# Patient Record
Sex: MALE | Race: BLACK OR AFRICAN AMERICAN | NOT HISPANIC OR LATINO | Employment: OTHER | URBAN - METROPOLITAN AREA
[De-identification: names, ages, dates, MRNs, and addresses within clinical notes are randomized per-mention and may not be internally consistent; named-entity substitution may affect disease eponyms.]

---

## 2017-02-27 ENCOUNTER — OFFICE VISIT (OUTPATIENT)
Dept: MEDICAL GROUP | Facility: MEDICAL CENTER | Age: 57
End: 2017-02-27
Attending: FAMILY MEDICINE
Payer: MEDICAID

## 2017-02-27 VITALS
OXYGEN SATURATION: 98 % | TEMPERATURE: 97.7 F | DIASTOLIC BLOOD PRESSURE: 62 MMHG | WEIGHT: 175 LBS | HEIGHT: 72 IN | BODY MASS INDEX: 23.7 KG/M2 | RESPIRATION RATE: 16 BRPM | HEART RATE: 80 BPM | SYSTOLIC BLOOD PRESSURE: 108 MMHG

## 2017-02-27 DIAGNOSIS — R39.11 URINARY HESITANCY: ICD-10-CM

## 2017-02-27 DIAGNOSIS — M79.671 PAIN IN BOTH FEET: ICD-10-CM

## 2017-02-27 DIAGNOSIS — M25.562 CHRONIC PAIN OF LEFT KNEE: ICD-10-CM

## 2017-02-27 DIAGNOSIS — M25.512 LEFT SHOULDER PAIN, UNSPECIFIED CHRONICITY: ICD-10-CM

## 2017-02-27 DIAGNOSIS — G89.29 CHRONIC PAIN OF LEFT KNEE: ICD-10-CM

## 2017-02-27 DIAGNOSIS — M54.5 MIDLINE LOW BACK PAIN, UNSPECIFIED CHRONICITY, WITH SCIATICA PRESENCE UNSPECIFIED: ICD-10-CM

## 2017-02-27 DIAGNOSIS — M79.672 PAIN IN BOTH FEET: ICD-10-CM

## 2017-02-27 DIAGNOSIS — Z12.11 COLON CANCER SCREENING: ICD-10-CM

## 2017-02-27 DIAGNOSIS — Z00.00 HEALTH CARE MAINTENANCE: ICD-10-CM

## 2017-02-27 DIAGNOSIS — Z12.5 SCREENING PSA (PROSTATE SPECIFIC ANTIGEN): ICD-10-CM

## 2017-02-27 DIAGNOSIS — J44.9 CHRONIC OBSTRUCTIVE PULMONARY DISEASE, UNSPECIFIED COPD TYPE (HCC): ICD-10-CM

## 2017-02-27 DIAGNOSIS — J45.40 MODERATE PERSISTENT ASTHMA WITHOUT COMPLICATION: ICD-10-CM

## 2017-02-27 PROCEDURE — 99214 OFFICE O/P EST MOD 30 MIN: CPT | Performed by: FAMILY MEDICINE

## 2017-02-27 PROCEDURE — 99215 OFFICE O/P EST HI 40 MIN: CPT | Performed by: FAMILY MEDICINE

## 2017-02-27 RX ORDER — ALBUTEROL SULFATE 90 UG/1
2 AEROSOL, METERED RESPIRATORY (INHALATION) EVERY 6 HOURS PRN
Qty: 8.5 G | Refills: 3 | Status: ON HOLD | OUTPATIENT
Start: 2017-02-27 | End: 2018-08-23

## 2017-02-27 RX ORDER — ALBUTEROL SULFATE 90 UG/1
2 AEROSOL, METERED RESPIRATORY (INHALATION) EVERY 6 HOURS PRN
Qty: 8.5 G | Refills: 3 | Status: SHIPPED | OUTPATIENT
Start: 2017-02-27 | End: 2017-02-27 | Stop reason: SDUPTHER

## 2017-02-27 RX ORDER — TIOTROPIUM BROMIDE 18 UG/1
18 CAPSULE ORAL; RESPIRATORY (INHALATION) DAILY
Qty: 30 CAP | Refills: 3 | Status: SHIPPED | OUTPATIENT
Start: 2017-02-27 | End: 2017-02-27 | Stop reason: SDUPTHER

## 2017-02-27 RX ORDER — GABAPENTIN 100 MG/1
100 CAPSULE ORAL 3 TIMES DAILY
Qty: 90 CAP | Refills: 3 | Status: SHIPPED | OUTPATIENT
Start: 2017-02-27 | End: 2018-08-17

## 2017-02-27 RX ORDER — TIOTROPIUM BROMIDE 18 UG/1
18 CAPSULE ORAL; RESPIRATORY (INHALATION) DAILY
Qty: 30 CAP | Refills: 3 | Status: SHIPPED | OUTPATIENT
Start: 2017-02-27 | End: 2018-08-17

## 2017-02-27 RX ORDER — GABAPENTIN 100 MG/1
100 CAPSULE ORAL 3 TIMES DAILY
Qty: 90 CAP | Refills: 3 | Status: SHIPPED | OUTPATIENT
Start: 2017-02-27 | End: 2017-02-27 | Stop reason: SDUPTHER

## 2017-02-27 RX ORDER — IBUPROFEN 800 MG/1
400-800 TABLET ORAL EVERY 8 HOURS PRN
Qty: 30 TAB | Refills: 3 | Status: SHIPPED | OUTPATIENT
Start: 2017-02-27 | End: 2017-02-27 | Stop reason: SDUPTHER

## 2017-02-27 RX ORDER — IBUPROFEN 800 MG/1
400-800 TABLET ORAL EVERY 8 HOURS PRN
Qty: 30 TAB | Refills: 3 | Status: SHIPPED | OUTPATIENT
Start: 2017-02-27 | End: 2018-08-17

## 2017-02-27 ASSESSMENT — ENCOUNTER SYMPTOMS
CHILLS: 0
WHEEZING: 1
TREMORS: 0
COUGH: 1
SPEECH CHANGE: 0
HEADACHES: 0
FEVER: 0
FOCAL WEAKNESS: 0
VOMITING: 0
PALPITATIONS: 0
NAUSEA: 0
SHORTNESS OF BREATH: 0
BACK PAIN: 1
SENSORY CHANGE: 0
SPUTUM PRODUCTION: 0
NECK PAIN: 0
DEPRESSION: 1
TINGLING: 1
ABDOMINAL PAIN: 0

## 2017-02-27 ASSESSMENT — PAIN SCALES - GENERAL: PAINLEVEL: 10=SEVERE PAIN

## 2017-02-27 ASSESSMENT — LIFESTYLE VARIABLES: HISTORY_ALCOHOL_USE: 0

## 2017-02-27 ASSESSMENT — PATIENT HEALTH QUESTIONNAIRE - PHQ9: CLINICAL INTERPRETATION OF PHQ2 SCORE: 2

## 2017-02-27 NOTE — PROGRESS NOTES
Subjective:      Korey Davis is a 57 y.o. male who presents with Establish Care; Pain; Medication Management; and Asthma            HPI Comments: Pt here to reestablish with the clinic, he has a history of chronic pain (back, knee, shoulder, B feet) and asthma.     He will need to get a refill of his inhaler. He states that he is needing to use his rescue inhaler on a daily basis. He states he was never given a maintenance inhaler, so we will start patient using Spiriva on a daily basis. We'll also order a pulmonary function test to see the severity of the obstructive lung disease he is suffering from.  Patient states he also smokes about half a pack a day. Discussed the risks of continuing to smoke with pre-existing asthma or COPD. Discussed nicotine patches which patient declined today. Also discussed attending smoking cessation classes in the community to assist in quitting smoking. We'll continue to follow.    He states that he recently fell and injured his left shoulder and is having difficulty extending his shoulder all the way forward and upward. An x-ray has been ordered for patient. He states he did not go to the emergency room after a fall despite having difficulty moving his shoulder. Patient has been advised if he continues to have issues with his shoulder he should proceed to the emergency room but a referral to an orthopedic doctor has also been made for patient. Will also refer patient to physical therapy for additional assistance in the management of his shoulder pain. Continue to follow.    Patient states that he had several surgeries to his left knee over a period of several years starting when he was still living in [place]. Patient states that he was incarcerated for a period of time and during that time he did reinjure his knee. We will get an x-ray of his knee as well as referral back to orthopedics for any further assistance in management of his knee. Patient has been started on  Neurontin as well as Motrin. A pain management referral was also made for patient to have any additional assistance in managing his chronic knee pain as well as his other chronic pain issues. We'll continue to follow.    He has also been having issues with his lower back. He states that he did not have any recent back injuries but has been having more pain that does not radiate but is causing him constant discomfort. The previous x-ray of his back showed degenerative changes so a MRI will be ordered to further assess for any soft tissue issues in his lower back. Will refer to pain management as well for additional assistance in managing his chronic back pain as well as his other chronic pain issues. We'll continue to follow.    He has also been having pain in his feet bilaterally he has changes consistent with bunions on both feet. Will get x-rays to further assess the changes in his feet. We'll continue to follow.    Due to his age greater than 50 will order a fit screen to further assess for colon cancer. We'll also order other health maintenance tests to check his cholesterol as well as metabolic function and complete blood count. We'll also assess his vitamin D status due to all of his chronic pain issues as well as a thyroid function to assess his reason for his chronic fatigue. We'll continue to follow.    We'll also order a PSA since he has been having issues with increased frequency and occasional hesitancy. He has not noticed any odor or color changes in his urine but will order a urinalysis to further assess for the possibility of infection. He is currently not on any medications for his urinary hesitancy but he states he has been seen at [facility] so will attempt to get records from [facility] to see of the treatments already attempted. Continue to follow.    His past surgical history includes multiple knee surgeries.    Patient is unaware of any significant family medical history. Patient states that  "everyone in his family has high blood pressure and thinks they're being treated for that.    Patient states he smokes at least half a pack a day, denies drinking alcohol and denies using any other substances.          Review of Systems   Constitutional: Negative for fever and chills.   HENT: Negative for hearing loss.    Respiratory: Positive for cough and wheezing. Negative for sputum production and shortness of breath.    Cardiovascular: Negative for chest pain and palpitations.   Gastrointestinal: Negative for nausea, vomiting and abdominal pain.   Genitourinary: Positive for urgency and frequency. Negative for dysuria and hematuria.   Musculoskeletal: Positive for back pain and joint pain. Negative for neck pain.   Neurological: Positive for tingling. Negative for tremors, sensory change, speech change, focal weakness and headaches.          Objective:     Temp(Src) 36.5 °C (97.7 °F)  Ht 1.829 m (6' 0.01\")  Wt 79.379 kg (175 lb)  BMI 23.73 kg/m2  SpO2 98%     Physical Exam   HENT:   Right Ear: External ear normal.   Left Ear: External ear normal.   Nose: Nose normal.   Mouth/Throat: Oropharynx is clear and moist.   Eyes: EOM are normal. Pupils are equal, round, and reactive to light.   Neck: Normal range of motion.   Cardiovascular: Normal rate, regular rhythm and normal heart sounds.  Exam reveals no friction rub.    No murmur heard.  Pulmonary/Chest: Effort normal and breath sounds normal. No respiratory distress. He has no wheezes. He has no rales.   Slight decreased breath sounds    Abdominal: Soft. Bowel sounds are normal. He exhibits no distension. There is no tenderness.   Musculoskeletal:   Decreased ROM of back and extremities (L shoulder) with extension, internal and external rotation and abduction with tenderness over the anterior aspect of his L shoulder, and crepitus in L knee with in flexion and extension  Bunions over B 1st PIP                Assessment/Plan:     1. Left shoulder pain, " unspecified chronicity  Patient get a shoulder x-ray, and we'll have him start Neurontin as directed as well as Motrin as needed. A referral to pain management has been made for patient to have his chronic pain issues further assessed and also for assistance in management. ER precautions have been given to pt. We'll continue to follow.  - DX-SHOULDER 2+ LEFT; Future  - REFERRAL TO PAIN CLINIC  - REFERRAL TO PHYSICAL THERAPY Reason for Therapy: Eval/Treat/Report  - gabapentin (NEURONTIN) 100 MG Cap; Take 1 Cap by mouth 3 times a day.  Dispense: 90 Cap; Refill: 3    2. Midline low back pain, unspecified chronicity, with sciatica presence unspecified  An MRI of his lower back has been ordered due to the previous degenerative changes noted in his lower back. A physical therapy referral has also been made as well as a pain management referral. Will have patient continue to use the medications that have been prescribed to him. ER precautions were also given to patient if he should suddenly develop any worsened symptoms or other neurologic changes. We'll continue to follow.  - MR-LUMBAR SPINE-W/O; Future  - REFERRAL TO PAIN CLINIC  - REFERRAL TO PHYSICAL THERAPY Reason for Therapy: Eval/Treat/Report  - gabapentin (NEURONTIN) 100 MG Cap; Take 1 Cap by mouth 3 times a day.  Dispense: 90 Cap; Refill: 3    3. Pain in both feet  X-rays of both feet have been ordered for patient we'll have him start the medications that have been prescribed to him as well as a pain management referral for a further assessment and assistance in management. We'll continue to follow.  - DX-FOOT-COMPLETE 3+ LEFT; Future  - DX-FOOT-COMPLETE 3+ RIGHT; Future  - REFERRAL TO PAIN CLINIC  - gabapentin (NEURONTIN) 100 MG Cap; Take 1 Cap by mouth 3 times a day.  Dispense: 90 Cap; Refill: 3    4. Moderate persistent asthma without complication  We'll have him continue to use his inhaler as directed will add Spiriva as a maintenance inhaler. A pulmonary  function test has also been ordered to further assess the severity of his asthma or COPD. Will continue to follow. Patient has also been advised to quit smoking. Patient states he is not ready to do so yet.    5. Chronic pain of left knee  A referral to an orthopedic doctor has been made as well as a pain specialist. We'll also have patient start the medications that have been prescribed to him. We'll continue to follow.  - REFERRAL TO ORTHOPEDICS  - gabapentin (NEURONTIN) 100 MG Cap; Take 1 Cap by mouth 3 times a day.  Dispense: 90 Cap; Refill: 3    6. Chronic obstructive pulmonary disease, unspecified COPD type (CMS-HCC)  See above plan.  - tiotropium (SPIRIVA) 18 MCG Cap; Inhale 1 Cap by mouth every day.  Dispense: 30 Cap; Refill: 3    7. Colon cancer screening  We'll have patient get a fit screen done we'll continue to follow. If the fit screen is positive will refer to GI for a further assessment and possible colonoscopy. We'll continue to follow.  - OCCULT BLOOD FECES IMMUNOASSAY; Future    8. Health care maintenance  Will check his lipids as well as metabolic function. We'll also check a vitamin D and a thyroid panel for a further assessment of his overall health. We'll continue to follow.  - TSH WITH REFLEX TO FT4; Future  - COMP METABOLIC PANEL; Future  - LIPID PROFILE; Future  - CBC WITH DIFFERENTIAL; Future  - VITAMIN D,25 HYDROXY; Future    9. Screening PSA (prostate specific antigen)  Due to his urinary hesitancy and urgency will order a PSA and UA for a further assessment. If elevated will refer to urology for assistance in management. We'll continue to follow.  - PROSTATE SPECIFIC AG SCREENING; Future    10. Urinary hesitancy  See above plan.  - URINALYSIS,CULTURE IF INDICATED; Future    >40 ON THE NEED TO REQUEST RECORDS FROM [facility] THIS min spent face to face with patient, >50% of time spent counseling, coordinating care, reviewing records, discussing POC, educating patient

## 2017-02-27 NOTE — MR AVS SNAPSHOT
"Korey Davis   2017 1:10 PM   Office Visit   MRN: [id number]    Department[facility]  [facility]   Dept Phone:  [phone]    Description:  Male : 1960   Provider:  José Miguel Justin M.D.           Reason for Visit     Establish Care     Pain     Medication Management     Asthma           Allergies as of 2017     No Known Allergies      You were diagnosed with     Left shoulder pain, unspecified chronicity   [7432026]       Midline low back pain, unspecified chronicity, with sciatica presence unspecified   [2303514]       Pain in both feet   [025414]       Moderate persistent asthma without complication   [243276]   pt using inhaler    Chronic pain of left knee   [233531]       Chronic obstructive pulmonary disease, unspecified COPD type (CMS-[facility])   [9403531]       Colon cancer screening   [991229]       Health care maintenance   [113849]       Screening PSA (prostate specific antigen)   [287403]         Vital Signs     Blood Pressure Pulse Temperature Respirations Height Weight    108/62 mmHg 80 36.5 °C (97.7 °F) 16 1.829 m (6' 0.01\") 79.379 kg (175 lb)    Body Mass Index Oxygen Saturation Smoking Status             23.73 kg/m2 98% Current Some Day Smoker         Basic Information     Date Of Birth Sex Race Ethnicity Preferred Language    1960 Male Black or  Non- English      Your appointments     2017  1:30 PM   Established Patient with José Miguel Justin M.D.   [facility] ([facility]care Center)    [address] [place] [id number]   [phone]           You will be receiving a confirmation call a few days before your appointment from our automated call confirmation system.              Problem List              ICD-10-CM Priority Class Noted - Resolved    Back pain M54.9   2014 - Present    Healthcare maintenance Z00.00   2014 - Present    S/P knee surgery Z98.890   2014 - Present    Mood disorder (CMS-HCC) F39   2014 - Present    Sepsis " (CMS-[facility]) A41.9   11/17/2015 - Present    Leukocytosis D72.829   11/17/2015 - Present    Right Tonsillar abscess J36   11/17/2015 - Present    Tachycardia R00.0   11/17/2015 - Present    Tobacco abuse Z72.0   11/17/2015 - Present    Methamphetamine use F15.10   6/22/2016 - Present    Normocytic anemia D64.9   6/23/2016 - Present      Health Maintenance        Date Due Completion Dates    IMM DTaP/Tdap/Td Vaccine (1 - Tdap) 2/3/1979 ---    IMM PNEUMOCOCCAL 19-64 (ADULT) MEDIUM RISK SERIES (1 of 1 - PPSV23) 2/3/1979 ---    COLONOSCOPY 2/3/2010 ---    IMM INFLUENZA (1) 9/1/2016 11/17/2015            Current Immunizations     Influenza Vaccine Quad Inj (Preserved) 11/17/2015 11:02 PM      Below and/or attached are the medications your provider expects you to take. Review all of your home medications and newly ordered medications with your provider and/or pharmacist. Follow medication instructions as directed by your provider and/or pharmacist. Please keep your medication list with you and share with your provider. Update the information when medications are discontinued, doses are changed, or new medications (including over-the-counter products) are added; and carry medication information at all times in the event of emergency situations     Allergies:  No Known Allergies          Medications  Valid as of: February 27, 2017 -  1:27 PM    Generic Name Brand Name Tablet Size Instructions for use    Albuterol Sulfate (Aero Soln) albuterol 108 (90 BASE) MCG/ACT Inhale 2 Puffs by mouth every 6 hours as needed for Shortness of Breath.        Gabapentin (Cap) NEURONTIN 100 MG Take 1 Cap by mouth 3 times a day.        Tiotropium Bromide Monohydrate (Cap) SPIRIVA 18 MCG Inhale 1 Cap by mouth every day.        .                 Medicines prescribed today were sent to:     [facility] [address] [place] [address] - [address] [address] [address] [facility] [address]    Phone: [phone] Fax:  [phone]    Open 24 Hours?: Yes    [facility] - [place], [place] - [address]    [address] [place] [address]    Phone: [phone] Fax: [phone]    Open 24 Hours?: No      Medication refill instructions:       If your prescription bottle indicates you have medication refills left, it is not necessary to call your provider’s office. Please contact your pharmacy and they will refill your medication.    If your prescription bottle indicates you do not have any refills left, you may request refills at any time through one of the following ways: The online viVood system (except Urgent Care), by calling your provider’s office, or by asking your pharmacy to contact your provider’s office with a refill request. Medication refills are processed only during regular business hours and may not be available until the next business day. Your provider may request additional information or to have a follow-up visit with you prior to refilling your medication.   *Please Note: Medication refills are assigned a new Rx number when refilled electronically. Your pharmacy may indicate that no refills were authorized even though a new prescription for the same medication is available at the pharmacy. Please request the medicine by name with the pharmacy before contacting your provider for a refill.        Your To Do List     Future Labs/Procedures Complete By Expires    CBC WITH DIFFERENTIAL  As directed 2/27/2018    COMP METABOLIC PANEL  As directed 2/27/2018    DX-FOOT-COMPLETE 3+ LEFT  As directed 8/30/2017    DX-FOOT-COMPLETE 3+ RIGHT  As directed 8/30/2017    DX-SHOULDER 2+ LEFT  As directed 8/30/2017    LIPID PROFILE  As directed 2/27/2018    MR-LUMBAR SPINE-W/O  As directed 2/27/2018    OCCULT BLOOD FECES IMMUNOASSAY  As directed 2/27/2018    PROSTATE SPECIFIC AG SCREENING  As directed 2/27/2018    TSH WITH REFLEX TO FT4  As directed 2/27/2018    VITAMIN D,25 HYDROXY  As directed 2/27/2018      Referral     A  referral request has been sent to our patient care coordination department. Please allow 3-5 business days for us to process this request and contact you either by phone or mail. If you do not hear from us by the 5th business day, please call us at [phone].           BladeLogic Access Code: [email or web address]  Expires: 3/29/2017  1:27 PM    BladeLogic  A secure, online tool to manage your health information     [employer] BladeLogic® is a secure, online tool that connects you to your personalized health information from the privacy of your home -- day or night - making it very easy for you to manage your healthcare. Once the activation process is completed, you can even access your medical information using the BladeLogic syd, which is available for free in the Apple Syd store or Google Play store.     BladeLogic provides the following levels of access (as shown below):   My Chart Features   [facility] Primary Care Doctor [facility]  Specialists [facility]  Urgent  Care Non-[facility]  Primary Care  Doctor   Email your healthcare team securely and privately 24/7 X X X    Manage appointments: schedule your next appointment; view details of past/upcoming appointments X      Request prescription refills. X      View recent personal medical records, including lab and immunizations X X X X   View health record, including health history, allergies, medications X X X X   Read reports about your outpatient visits, procedures, consult and ER notes X X X X   See your discharge summary, which is a recap of your hospital and/or ER visit that includes your diagnosis, lab results, and care plan. X X       How to register for BladeLogic:  1. Go to  [email or web address].  2. Click on the Sign Up Now box, which takes you to the New Member Sign Up page. You will need to provide the following information:  a. Enter your BladeLogic Access Code exactly as it appears at the top of this page. (You will not need to use this code after you’ve completed the  sign-up process. If you do not sign up before the expiration date, you must request a new code.)   b. Enter your date of birth.   c. Enter your home email address.   d. Click Submit, and follow the next screen’s instructions.  3. Create a [employer] ID. This will be your [employer] login ID and cannot be changed, so think of one that is secure and easy to remember.  4. Create a [employer] password. You can change your password at any time.  5. Enter your Password Reset Question and Answer. This can be used at a later time if you forget your password.   6. Enter your e-mail address. This allows you to receive e-mail notifications when new information is available in [employer].  7. Click Sign Up. You can now view your health information.    For assistance activating your [employer] account, call [phone]

## 2017-03-08 ENCOUNTER — HOSPITAL ENCOUNTER (OUTPATIENT)
Dept: RADIOLOGY | Facility: MEDICAL CENTER | Age: 57
End: 2017-03-08
Attending: FAMILY MEDICINE
Payer: MEDICAID

## 2017-03-08 DIAGNOSIS — M25.512 LEFT SHOULDER PAIN, UNSPECIFIED CHRONICITY: ICD-10-CM

## 2017-03-08 DIAGNOSIS — M79.672 PAIN IN BOTH FEET: ICD-10-CM

## 2017-03-08 DIAGNOSIS — M79.671 PAIN IN BOTH FEET: ICD-10-CM

## 2017-03-08 PROCEDURE — 73630 X-RAY EXAM OF FOOT: CPT | Mod: RT

## 2017-03-08 PROCEDURE — 73030 X-RAY EXAM OF SHOULDER: CPT | Mod: LT

## 2017-03-21 ENCOUNTER — APPOINTMENT (OUTPATIENT)
Dept: RADIOLOGY | Facility: MEDICAL CENTER | Age: 57
End: 2017-03-21
Attending: FAMILY MEDICINE
Payer: MEDICAID

## 2017-03-28 ENCOUNTER — HOSPITAL ENCOUNTER (OUTPATIENT)
Dept: PHYSICAL THERAPY | Facility: REHABILITATION | Age: 57
End: 2017-03-28
Attending: FAMILY MEDICINE
Payer: MEDICAID

## 2017-03-28 PROCEDURE — 97161 PT EVAL LOW COMPLEX 20 MIN: CPT

## 2017-06-11 ENCOUNTER — HOSPITAL ENCOUNTER (EMERGENCY)
Dept: HOSPITAL 8 - ED | Age: 57
Discharge: HOME | End: 2017-06-11
Payer: MEDICAID

## 2017-06-11 VITALS — HEIGHT: 72 IN | WEIGHT: 167.55 LBS | BODY MASS INDEX: 22.69 KG/M2

## 2017-06-11 VITALS — SYSTOLIC BLOOD PRESSURE: 136 MMHG | DIASTOLIC BLOOD PRESSURE: 80 MMHG

## 2017-06-11 DIAGNOSIS — J02.0: ICD-10-CM

## 2017-06-11 DIAGNOSIS — J45.909: Primary | ICD-10-CM

## 2017-06-11 DIAGNOSIS — B96.89: ICD-10-CM

## 2017-06-11 DIAGNOSIS — J20.9: ICD-10-CM

## 2017-06-11 PROCEDURE — 99283 EMERGENCY DEPT VISIT LOW MDM: CPT

## 2017-06-11 PROCEDURE — 93005 ELECTROCARDIOGRAM TRACING: CPT

## 2017-06-30 ENCOUNTER — OFFICE VISIT (OUTPATIENT)
Dept: PHYSICAL MEDICINE AND REHAB | Facility: MEDICAL CENTER | Age: 57
End: 2017-06-30
Payer: MEDICAID

## 2017-06-30 VITALS
BODY MASS INDEX: 22.21 KG/M2 | HEART RATE: 94 BPM | SYSTOLIC BLOOD PRESSURE: 124 MMHG | DIASTOLIC BLOOD PRESSURE: 84 MMHG | OXYGEN SATURATION: 98 % | HEIGHT: 72 IN | TEMPERATURE: 97.3 F | WEIGHT: 164 LBS

## 2017-06-30 DIAGNOSIS — M54.9 SPINAL PAIN: ICD-10-CM

## 2017-06-30 DIAGNOSIS — M51.36 DDD (DEGENERATIVE DISC DISEASE), LUMBAR: ICD-10-CM

## 2017-06-30 DIAGNOSIS — M79.671 PAIN IN BOTH FEET: ICD-10-CM

## 2017-06-30 DIAGNOSIS — M79.643 PAIN OF HAND, UNSPECIFIED LATERALITY: ICD-10-CM

## 2017-06-30 DIAGNOSIS — Z98.890 H/O KNEE SURGERY: ICD-10-CM

## 2017-06-30 DIAGNOSIS — M79.672 PAIN IN BOTH FEET: ICD-10-CM

## 2017-06-30 DIAGNOSIS — M54.10 RADICULITIS: ICD-10-CM

## 2017-06-30 DIAGNOSIS — M54.2 NECK PAIN: ICD-10-CM

## 2017-06-30 DIAGNOSIS — M25.562 LEFT KNEE PAIN, UNSPECIFIED CHRONICITY: ICD-10-CM

## 2017-06-30 DIAGNOSIS — G89.29 CHRONIC PAIN OF LEFT KNEE: ICD-10-CM

## 2017-06-30 DIAGNOSIS — M25.562 CHRONIC PAIN OF LEFT KNEE: ICD-10-CM

## 2017-06-30 DIAGNOSIS — M25.512 LEFT SHOULDER PAIN, UNSPECIFIED CHRONICITY: ICD-10-CM

## 2017-06-30 DIAGNOSIS — M25.50 ARTHRALGIA, UNSPECIFIED JOINT: ICD-10-CM

## 2017-06-30 DIAGNOSIS — G89.29 CHRONIC BILATERAL LOW BACK PAIN, WITH SCIATICA PRESENCE UNSPECIFIED: ICD-10-CM

## 2017-06-30 DIAGNOSIS — M54.5 MIDLINE LOW BACK PAIN, UNSPECIFIED CHRONICITY, WITH SCIATICA PRESENCE UNSPECIFIED: ICD-10-CM

## 2017-06-30 DIAGNOSIS — M50.30 DDD (DEGENERATIVE DISC DISEASE), CERVICAL: ICD-10-CM

## 2017-06-30 DIAGNOSIS — M19.90 ARTHRITIS: ICD-10-CM

## 2017-06-30 DIAGNOSIS — M79.18 MYOFASCIAL PAIN: ICD-10-CM

## 2017-06-30 DIAGNOSIS — M54.5 CHRONIC BILATERAL LOW BACK PAIN, WITH SCIATICA PRESENCE UNSPECIFIED: ICD-10-CM

## 2017-06-30 PROCEDURE — 99204 OFFICE O/P NEW MOD 45 MIN: CPT | Performed by: PHYSICAL MEDICINE & REHABILITATION

## 2017-06-30 RX ORDER — HYDROCODONE BITARTRATE AND ACETAMINOPHEN 10; 325 MG/1; MG/1
TABLET ORAL
Qty: 20 TAB | Refills: 0 | Status: SHIPPED | OUTPATIENT
Start: 2017-06-30 | End: 2018-07-24

## 2017-06-30 ASSESSMENT — ENCOUNTER SYMPTOMS
HEMOPTYSIS: 0
CHILLS: 0
PHOTOPHOBIA: 0
BACK PAIN: 1
VOMITING: 0
TREMORS: 1
NECK PAIN: 1
PALPITATIONS: 0
ORTHOPNEA: 0
NAUSEA: 0
TINGLING: 1
FEVER: 0
SPUTUM PRODUCTION: 0
MYALGIAS: 1
DOUBLE VISION: 0

## 2017-06-30 NOTE — PROGRESS NOTES
Subjective:      Korey Davis is a 57 y.o. right-hand dominant male who presents with New Patient      Chief complaint: Left knee pain        HPI   The patient notes long history of spinal/joints/musculoskeletal pain, at the bulk of his care in [place].    The patient notes ongoing pain about the left knee, worse with activities, limiting standing and walking tolerance. The patient notes undergoing 6 left knee surgeries in [place] in the time frame from 2004 to 2013, including total knee replacement. The patient notes seen by local orthopedic surgeon, records pending.    Patient notes low back pain, Notes intermittent left lower limb radiation with neuropathic component.    The patient notes neck pain, also notes intermittent upper limb radiation.    The patient notes right middle finger extension lag, Notes prior right hand surgery.    The patient notes left shoulder area pain, worse with activities, Notes prior orthopedic evaluation regarding this.    The patient has had prior treatment with medications. He is been to physical therapy. No bowel/bladder dysfunction noted. He is making an effort with home exercise program. The ongoing pain limits his ability to function. He is inquiring about additional treatment options.      MEDICAL RECORDS REVIEW/DATA REVIEW: Reviewed in epic.    Records Reviewed: Reviewed referring provider notes.     I reviewed medications. Notes prior use of hydrocodone, with benefit, tolerated. No aberrant behavior noted.     I reviewed  profile 6/30/2017.    I reviewed diagnostic studies:     I reviewed radiographs. Reviewed CT left knee 10/2014. Reviewed left knee x-rays 8/2014. Reviewed lumbar spine x-rays 8/2014. Reviewed cervical spine x-rays 8/2014. Reviewed left shoulder x-rays 3/2017. Reviewed bilateral foot x-rays 3/2017.    I reviewed lab studies. Reviewed labs 6/2016, including CMP, CBC. Reviewed urine drug screen 6/2016, reviewed abnormality with patient      I reviewed  medical issues.     I reviewed family history: No neuromuscular disorders noted.    I reviewed social issues.       PAST MEDICAL HISTORY:   Past Medical History   Diagnosis Date   • Asthma    • COPD (chronic obstructive pulmonary disease) (CMS-HCC)    • Back pain      chronic       PAST SURGICAL HISTORY:    Past Surgical History   Procedure Laterality Date   • Knee replacement, total  12/1/2011     [facility]   • Hand surgery Right    • Knee arthroscopy Left      note 6 total left knee surgeries from 2004 - 2013 in [place], including total knee replacement       ALLERGIES:  Review of patient's allergies indicates no known allergies.    MEDICATIONS:    Outpatient Encounter Prescriptions as of 6/30/2017   Medication Sig Dispense Refill   • hydrocodone/acetaminophen (NORCO)  MG Tab Take 1/2  to 1 tablet by mouth every 8 hours as needed for pain 20 Tab 0   • albuterol 108 (90 BASE) MCG/ACT Aero Soln inhalation aerosol Inhale 2 Puffs by mouth every 6 hours as needed for Shortness of Breath. 8.5 g 3   • ibuprofen (MOTRIN) 800 MG Tab Take 0.5-1 Tabs by mouth every 8 hours as needed. 30 Tab 3   • gabapentin (NEURONTIN) 100 MG Cap Take 1 Cap by mouth 3 times a day. 90 Cap 3   • tiotropium (SPIRIVA) 18 MCG Cap Inhale 1 Cap by mouth every day. 30 Cap 3     No facility-administered encounter medications on file as of 6/30/2017.       SOCIAL HISTORY:    Social History     Social History   • Marital Status: Single     Spouse Name: N/A   • Number of Children: N/A   • Years of Education: N/A     Social History Main Topics   • Smoking status: Current Some Day Smoker -- 0.40 packs/day     Types: Cigarettes   • Smokeless tobacco: Never Used   • Alcohol Use: No   • Drug Use: No      Comment: Notes prior methamphetamine use, Notes previously stopped use, denies current use   • Sexual Activity: Not Asked     Other Topics Concern   •  Service No   • Blood Transfusions No   • Caffeine Concern No   • Occupational  "Exposure No   • Hobby Hazards No   • Sleep Concern Yes     sometimes    • Stress Concern No   • Weight Concern No   • Special Diet No   • Back Care Yes   • Exercise No   • Bike Helmet No   • Seat Belt Yes   • Self-Exams No     Social History Narrative     Note perhaps anxiety/depression, otherwise no psychiatric disorder noted    Review of Systems   Constitutional: Negative for fever and chills.   HENT: Negative for hearing loss and tinnitus.    Eyes: Negative for double vision and photophobia.   Respiratory: Negative for hemoptysis and sputum production.    Cardiovascular: Negative for palpitations and orthopnea.   Gastrointestinal: Negative for nausea and vomiting.   Genitourinary: Negative for urgency.   Musculoskeletal: Positive for myalgias, back pain, joint pain and neck pain.   Skin: Negative.    Neurological: Positive for tingling and tremors.   Endo/Heme/Allergies: Negative.    All other systems reviewed and are negative.        Objective:     /84 mmHg  Pulse 94  Temp(Src) 36.3 °C (97.3 °F)  Ht 1.829 m (6' 0.01\")  Wt 74.39 kg (164 lb)  BMI 22.24 kg/m2  SpO2 98%     Physical Exam  Constitutional: oriented to person, place, and time, appears well-developed and well-nourished.   HEENT: Normocephalic atraumatic, neck supple, no JVD noted, no masses noted, no meningeal signs noted  Lymphadenopathy: no cervical, supraclavicular, or inguinal lymphadenopathy noted  Cardiovascular: Intact distal pulses, including at wrists and ankles, no limb swelling noted  Pulmonary: No tachypnea noted, no accessory muscle use noted, no dyspnea noted  Abdominal: Soft, nontender, exhibits no distension, no peritoneal signs, no HSM  Musculoskeletal:   Right shoulder: exhibits mild tenderness. Minimal pain with range of motion testing  Left shoulder: exhibits  tenderness.  pain with range of motion testing  Right hip: exhibits only mild tenderness. Minimal pain with range of motion testing  Left hip: exhibits only mild " tenderness. Minimal pain with range of motion testing  Cervical back: exhibits decreased range of motion,  tenderness and  pain. Spurling's testing produces axial pain, trigger points noted  Lumbar back: exhibits  decreased range of motion,  tenderness and  pain. negative straight leg testing, trigger points noted  Knee: Tender with palpation about left knee, pain with range of motion testing, healed scar, no signs of infection  Wrist/hand: Right middle finger extension lag noted, healed scar dorsal aspect right hand, mild pain with range of motion testing, negative tinel's at wrist, negative tinel's at elbows  Neurological: oriented to person, place, and time. Cranial nerves grossly intact, normal strength. Sensation intact distally. Reflexes 1+ in upper and lower limbs, Gait antalgic with steppage pattern, No upper motor neuron signs evident  Skin: Skin is intact. no rashes or lesions noted  Psychiatric: normal mood and affect. speech is normal and behavior is normal. Judgment and thought content normal. Cognition and memory are normal.        Assessment/Plan:       ASSESSMENT:    1. Chronic left knee pain, sprain strain, multiple left knee surgeries including total knee replacement in [place], impaired gait, concern for hardware abnormality    - DX-KNEE COMPLETE 4+ LEFT; Future  - CT-KNEE W/O LEFT; Future    2. Low back pain, myofascial pain, intermittent lumbar radiculitis, degenerative disc disease, concern for stenosis    - MR-LUMBAR SPINE-W/O; Future    3. Neck pain, myofascial pain, intermittent cervical radiculitis, degenerative disc disease, concern for stenosis    - MR-CERVICAL SPINE-W/O; Future    4. History of right hand surgery, right middle finger extension lag    - DX-HAND 3+ RIGHT; Future    5. Left shoulder pain, sprain strain, arthritis, orthopedics consulted    6. Controlled substance agreement discussed    - PAIN MANAGEMENT SCRN, W/ RFLX TO QNT; check results, provided today    7. Comorbid  medical issues, with care per primary care provider       DISCUSSION/PLAN:    - I discussed management options. I reviewed symptomatic care    - I would like to obtain/review additional records, including orthopedic records, [facility]IG[facility]AL[facility] and [facility]    - I reviewed home exercise program, with medical precautions. Left knee activity/restrictions per orthopedics    - The patient can consider complementary trials with acupuncture, massage therapy, or TENS unit     - I reviewed medication monitoring. I reviewed pain medication dosing, reviewed risks and alternatives. I reviewed medication adjustments.     - Pending clean drug testing, I wrote prescription for the following under the compassionate use provision:    - hydrocodone/acetaminophen (NORCO)  MG Tab; Take 1/2  to 1 tablet by mouth every 8 hours as needed for pain  Dispense: 20 Tab; Refill: 0, recommended for intermittent use    - I reviewed risks, side effects, and interactions of medications, including over-the-counter medications. I reviewed tylenol/acetaminophen dosing. I advise the patient to avoid sudafed/pseudoephedrine-type medication as well as herbs/supplements. I reviewed bowel management program. I recommend avoid use of benzodiazepines due to risk of interaction with pain medication. I advise the patient to avoid alcohol use. I reviewed the controlled substance prescribing program. I reviewed further symptomatic medications.    - I reviewed additional diagnostic options, including further/advanced imaging, electrodiagnostic testing, vascular studies, and further lab screen    - I reviewed additional therapeutic options, including injection/interventional therapy and additional consultative input    - I reviewed psychosocial interventions    - I encourage the patient to stop or decrease cigarette use    - Return after the above-noted diagnostic studies  or an as-needed basis      Please note that this dictation was created using voice recognition software.  I have made every reasonable attempt to correct obvious errors but there may be errors of grammar and content that I may have overlooked prior to finalization of this note.

## 2017-06-30 NOTE — MR AVS SNAPSHOT
"Korey Davis   2017 1:30 PM   Office Visit   MRN: [id number]    Department:  [facility]   Dept Phone:  [phone]    Description:  Male : 1960   Provider:  Marco Kamara M.D.           Reason for Visit     Follow-Up           Allergies as of 2017     No Known Allergies      You were diagnosed with     Left shoulder pain, unspecified chronicity   [9341445]       Midline low back pain, unspecified chronicity, with sciatica presence unspecified   [4076739]       Pain in both feet   [405748]       Chronic pain of left knee   [514096]       Left knee pain, unspecified chronicity   [9914111]       H/O knee surgery   [564720]       Neck pain   [025018]       Radiculitis   [238550]       Chronic bilateral low back pain, with sciatica presence unspecified   [2744053]       DDD (degenerative disc disease), cervical   [593372]       DDD (degenerative disc disease), lumbar   [543730]       Spinal pain   [317532]       Arthralgia, unspecified joint   [3912489]       Arthritis   [480037]       Pain of hand, unspecified laterality   [671785]       Myofascial pain   [382347]         Vital Signs     Blood Pressure Pulse Temperature Height Weight Body Mass Index    124/84 mmHg 94 36.3 °C (97.3 °F) 1.829 m (6' 0.01\") 74.39 kg (164 lb) 22.24 kg/m2    Oxygen Saturation Smoking Status                98% Current Some Day Smoker          Basic Information     Date Of Birth Sex Race Ethnicity Preferred Language    1960 Male Black or  Non- English      Your appointments     2017  8:00 AM   Follow Up Visit with Marco Kamara M.D.   [facility] (--)    [address]  [facility] [address] [phone]           You will be receiving a confirmation call a few days before your appointment from our automated call confirmation system.              Problem List              ICD-10-CM Priority Class Noted - Resolved    Back pain M54.9   " 8/4/2014 - Present    Healthcare maintenance Z00.00   8/4/2014 - Present    S/P knee surgery Z98.890   8/4/2014 - Present    Mood disorder (CMS-HCC) F39   8/4/2014 - Present    Sepsis (CMS-HCC) A41.9   11/17/2015 - Present    Leukocytosis D72.829   11/17/2015 - Present    Right Tonsillar abscess J36   11/17/2015 - Present    Tachycardia R00.0   11/17/2015 - Present    Tobacco abuse Z72.0   11/17/2015 - Present    Methamphetamine use F15.10   6/22/2016 - Present    Normocytic anemia D64.9   6/23/2016 - Present      Health Maintenance        Date Due Completion Dates    COLON CANCER SCREENING ANNUAL FIT 1960 ---    IMM DTaP/Tdap/Td Vaccine (1 - Tdap) 2/3/1979 ---    IMM PNEUMOCOCCAL 19-64 (ADULT) MEDIUM RISK SERIES (1 of 1 - PPSV23) 2/3/1979 ---            Current Immunizations     Influenza Vaccine Quad Inj (Preserved) 11/17/2015 11:02 PM      Below and/or attached are the medications your provider expects you to take. Review all of your home medications and newly ordered medications with your provider and/or pharmacist. Follow medication instructions as directed by your provider and/or pharmacist. Please keep your medication list with you and share with your provider. Update the information when medications are discontinued, doses are changed, or new medications (including over-the-counter products) are added; and carry medication information at all times in the event of emergency situations     Allergies:  No Known Allergies          Medications  Valid as of: June 30, 2017 -  1:53 PM    Generic Name Brand Name Tablet Size Instructions for use    Albuterol Sulfate (Aero Soln) albuterol 108 (90 BASE) MCG/ACT Inhale 2 Puffs by mouth every 6 hours as needed for Shortness of Breath.        Gabapentin (Cap) NEURONTIN 100 MG Take 1 Cap by mouth 3 times a day.        Hydrocodone-Acetaminophen (Tab) NORCO  MG Take 1/2  to 1 tablet by mouth every 8 hours as needed for pain        Ibuprofen (Tab) MOTRIN 800 MG Take  0.5-1 Tabs by mouth every 8 hours as needed.        Tiotropium Bromide Monohydrate (Cap) SPIRIVA 18 MCG Inhale 1 Cap by mouth every day.        .                 Medicines prescribed today were sent to:     "[facility] [id number] [place] [address], [place] - [address]    [address] [address]    Phone: [phone] Fax: [phone]    Open 24 Hours?: Yes      Medication refill instructions:       If your prescription bottle indicates you have medication refills left, it is not necessary to call your provider’s office. Please contact your pharmacy and they will refill your medication.    If your prescription bottle indicates you do not have any refills left, you may request refills at any time through one of the following ways: The online [facility] system (except Urgent Care), by calling your provider’s office, or by asking your pharmacy to contact your provider’s office with a refill request. Medication refills are processed only during regular business hours and may not be available until the next business day. Your provider may request additional information or to have a follow-up visit with you prior to refilling your medication.   *Please Note: Medication refills are assigned a new Rx number when refilled electronically. Your pharmacy may indicate that no refills were authorized even though a new prescription for the same medication is available at the pharmacy. Please request the medicine by name with the pharmacy before contacting your provider for a refill.        Your To Do List     Future Labs/Procedures Complete By Expires    CT-KNEE W/O LEFT  As directed 6/30/2018    DX-HAND 3+ RIGHT  As directed 6/30/2018    DX-KNEE COMPLETE 4+ LEFT  As directed 6/30/2018    MR-CERVICAL SPINE-W/O  As directed 6/30/2018    MR-LUMBAR SPINE-W/O  As directed 6/30/2018    PAIN MANAGEMENT SCRN, W/ RFLX TO QNT  As directed 6/30/2018    Comments:    Current Meds (name, sig, last dose):      Current outpatient prescriptions:   •  albuterol, 2 Puff, Inhalation, Q6HRS PRN, 6/30/2017  •  ibuprofen, 400-800 mg, Oral, Q8HRS PRN  •  gabapentin, 100 mg, Oral, TID  •  tiotropium, 18 mcg, Inhalation, DAILY               [email or web address] Access Code: [email or web address]  Expires: 7/30/2017  1:53 PM    [employer]  A secure, online tool to manage your health information     [facility]’s [employer]® is a secure, online tool that connects you to your personalized health information from the privacy of your home -- day or night - making it very easy for you to manage your healthcare. Once the activation process is completed, you can even access your medical information using the [employer] syd, which is available for free in the Apple Syd store or Google Play store.     [employer] provides the following levels of access (as shown below):   My Chart Features   [facility] Primary Care Doctor [facility]  Specialists [facility]  Urgent  Care Non-[facility]  Primary Care  Doctor   Email your healthcare team securely and privately 24/7 X X X    Manage appointments: schedule your next appointment; view details of past/upcoming appointments X      Request prescription refills. X      View recent personal medical records, including lab and immunizations X X X X   View health record, including health history, allergies, medications X X X X   Read reports about your outpatient visits, procedures, consult and ER notes X X X X   See your discharge summary, which is a recap of your hospital and/or ER visit that includes your diagnosis, lab results, and care plan. X X       How to register for [employer]:  1. Go to  [email or web address].  2. Click on the Sign Up Now box, which takes you to the New Member Sign Up page. You will need to provide the following information:  a. Enter your [employer] Access Code exactly as it appears at the top of this page. (You will not need to use this code after you’ve completed the sign-up process. If you do not sign up before the  expiration date, you must request a new code.)   b. Enter your date of birth.   c. Enter your home email address.   d. Click Submit, and follow the next screen’s instructions.  3. Create a [employer] ID. This will be your [employer] login ID and cannot be changed, so think of one that is secure and easy to remember.  4. Create a [employer] password. You can change your password at any time.  5. Enter your Password Reset Question and Answer. This can be used at a later time if you forget your password.   6. Enter your e-mail address. This allows you to receive e-mail notifications when new information is available in [employer].  7. Click Sign Up. You can now view your health information.    For assistance activating your [employer] account, call [phone]        Quit Tobacco Information     Do you want to quit using tobacco?    Quitting tobacco decreases risks of cancer, heart and lung disease, increases life expectancy, improves sense of taste and smell, and increases spending money, among other benefits.    If you are thinking about quitting, we can help.  • [facility] Quit Tobacco Program: 273.961.1627  o Program occurs weekly for four weeks and includes pharmacist consultation on products to support quitting smoking or chewing tobacco. A provider referral is needed for pharmacist consultation.  • Tobacco Users Help Hotline: 9-102-QUIT-NOW (721-7548) or https://[place].quitlogix.org/  o Free, confidential telephone and online coaching for [place] residents. Sessions are designed on a schedule that is convenient for you. Eligible clients receive free nicotine replacement therapy.  • Nationally: www.smokefree.gov  o Information and professional assistance to support both immediate and long-term needs as you become, and remain, a non-smoker. Smokefree.gov allows you to choose the help that best fits your needs.

## 2017-10-03 ENCOUNTER — HOSPITAL ENCOUNTER (EMERGENCY)
Dept: HOSPITAL 8 - ED | Age: 57
Discharge: HOME | End: 2017-10-03
Payer: MEDICAID

## 2017-10-03 VITALS — HEIGHT: 72 IN | BODY MASS INDEX: 22.63 KG/M2 | WEIGHT: 167.11 LBS

## 2017-10-03 VITALS — SYSTOLIC BLOOD PRESSURE: 134 MMHG | DIASTOLIC BLOOD PRESSURE: 79 MMHG

## 2017-10-03 DIAGNOSIS — J45.30: Primary | ICD-10-CM

## 2017-10-03 DIAGNOSIS — G89.29: ICD-10-CM

## 2017-10-03 DIAGNOSIS — J00: ICD-10-CM

## 2017-10-03 PROCEDURE — 99284 EMERGENCY DEPT VISIT MOD MDM: CPT

## 2017-10-03 PROCEDURE — 94640 AIRWAY INHALATION TREATMENT: CPT

## 2017-10-03 PROCEDURE — 71010: CPT

## 2017-10-03 PROCEDURE — 93005 ELECTROCARDIOGRAM TRACING: CPT

## 2017-12-28 ENCOUNTER — HOSPITAL ENCOUNTER (EMERGENCY)
Dept: HOSPITAL 8 - ED | Age: 57
Discharge: HOME | End: 2017-12-28
Payer: MEDICAID

## 2017-12-28 VITALS — WEIGHT: 171.3 LBS | HEIGHT: 72 IN | BODY MASS INDEX: 23.2 KG/M2

## 2017-12-28 VITALS — DIASTOLIC BLOOD PRESSURE: 84 MMHG | SYSTOLIC BLOOD PRESSURE: 142 MMHG

## 2017-12-28 DIAGNOSIS — H10.233: Primary | ICD-10-CM

## 2017-12-28 PROCEDURE — 99283 EMERGENCY DEPT VISIT LOW MDM: CPT

## 2018-04-23 ENCOUNTER — HOSPITAL ENCOUNTER (EMERGENCY)
Dept: HOSPITAL 8 - ED | Age: 58
Discharge: HOME | End: 2018-04-23
Payer: MEDICAID

## 2018-04-23 VITALS — BODY MASS INDEX: 23.71 KG/M2 | HEIGHT: 72 IN | WEIGHT: 175.05 LBS

## 2018-04-23 VITALS — SYSTOLIC BLOOD PRESSURE: 123 MMHG | DIASTOLIC BLOOD PRESSURE: 70 MMHG

## 2018-04-23 DIAGNOSIS — M25.561: Primary | ICD-10-CM

## 2018-04-23 DIAGNOSIS — G89.29: ICD-10-CM

## 2018-04-23 DIAGNOSIS — F17.210: ICD-10-CM

## 2018-04-23 DIAGNOSIS — M25.562: ICD-10-CM

## 2018-04-23 DIAGNOSIS — M19.90: ICD-10-CM

## 2018-04-23 DIAGNOSIS — Z59.0: ICD-10-CM

## 2018-04-23 DIAGNOSIS — J45.909: ICD-10-CM

## 2018-04-23 PROCEDURE — 73564 X-RAY EXAM KNEE 4 OR MORE: CPT

## 2018-04-23 PROCEDURE — 96372 THER/PROPH/DIAG INJ SC/IM: CPT

## 2018-04-23 PROCEDURE — 99284 EMERGENCY DEPT VISIT MOD MDM: CPT

## 2018-04-23 SDOH — ECONOMIC STABILITY - HOUSING INSECURITY: HOMELESSNESS: Z59.0

## 2018-05-22 ENCOUNTER — HOSPITAL ENCOUNTER (EMERGENCY)
Facility: MEDICAL CENTER | Age: 58
End: 2018-05-22
Attending: EMERGENCY MEDICINE
Payer: MEDICAID

## 2018-05-22 VITALS
DIASTOLIC BLOOD PRESSURE: 75 MMHG | HEART RATE: 83 BPM | OXYGEN SATURATION: 98 % | SYSTOLIC BLOOD PRESSURE: 120 MMHG | RESPIRATION RATE: 16 BRPM | HEIGHT: 72 IN | TEMPERATURE: 97.8 F | WEIGHT: 172.84 LBS | BODY MASS INDEX: 23.41 KG/M2

## 2018-05-22 DIAGNOSIS — M79.671 FOOT PAIN, BILATERAL: ICD-10-CM

## 2018-05-22 DIAGNOSIS — B35.1 ONYCHOMYCOSIS DUE TO DERMATOPHYTE: ICD-10-CM

## 2018-05-22 DIAGNOSIS — M79.672 FOOT PAIN, BILATERAL: ICD-10-CM

## 2018-05-22 PROCEDURE — 99283 EMERGENCY DEPT VISIT LOW MDM: CPT

## 2018-05-22 RX ORDER — GABAPENTIN 100 MG/1
100 CAPSULE ORAL 3 TIMES DAILY
Qty: 90 CAP | Refills: 0 | Status: SHIPPED | OUTPATIENT
Start: 2018-05-22 | End: 2018-08-17

## 2018-05-22 RX ORDER — NAPROXEN SODIUM 550 MG/1
550 TABLET ORAL 2 TIMES DAILY PRN
Qty: 20 TAB | Refills: 0 | Status: SHIPPED | OUTPATIENT
Start: 2018-05-22 | End: 2018-08-17

## 2018-05-23 NOTE — ED NOTES
Pt verbalized understanding of discharge and follow up instructions. Pt given Rx.  VSS.  All questions answered, ambulates with steady gait to discharge.

## 2018-05-23 NOTE — ED TRIAGE NOTES
"Chief Complaint   Patient presents with   • Foot Pain     bilateral foot pain states \"my feet are burning\".      /63   Pulse 90   Temp 36.5 °C (97.7 °F)   Resp 17   Ht 1.829 m (6')   Wt 78.4 kg (172 lb 13.5 oz)   SpO2 97%   BMI 23.44 kg/m²     "

## 2018-05-23 NOTE — DISCHARGE INSTRUCTIONS
Fungal Nail Infection  Introduction  Fungal nail infection is a common fungal infection of the toenails or fingernails. This condition affects toenails more often than fingernails. More than one nail may be infected. The condition can be passed from person to person (is contagious).  What are the causes?  This condition is caused by a fungus. Several types of funguses can cause the infection. These funguses are common in moist and warm areas. If your hands or feet come into contact with the fungus, it may get into a crack in your fingernail or toenail and cause the infection.  What increases the risk?  The following factors may make you more likely to develop this condition:  · Being male.  · Having diabetes.  · Being of older age.  · Living with someone who has the fungus.  · Walking barefoot in areas where the fungus thrives, such as showers or locker rooms.  · Having poor circulation.  · Wearing shoes and socks that cause your feet to sweat.  · Having athlete’s foot.  · Having a nail injury or history of a recent nail surgery.  · Having psoriasis.  · Having a weak body defense system (immune system).  What are the signs or symptoms?  Symptoms of this condition include:  · A pale spot on the nail.  · Thickening of the nail.  · A nail that becomes yellow or brown.  · A brittle or ragged nail edge.  · A crumbling nail.  · A nail that has lifted away from the nail bed.  How is this diagnosed?  This condition is diagnosed with a physical exam. Your health care provider may take a scraping or clipping from your nail to test for the fungus.  How is this treated?  Mild infections do not need treatment. If you have significant nail changes, treatment may include:  · Oral antifungal medicines. You may need to take the medicine for several weeks or several months, and you may not see the results for a long time. These medicines can cause side effects. Ask your health care provider what problems to watch for.  · Antifungal  nail polish and nail cream. These may be used along with oral antifungal medicines.  · Laser treatment of the nail.  · Surgery to remove the nail. This may be needed for the most severe infections.  Treatment takes a long time, and the infection may come back.  Follow these instructions at home:  Medicines  · Take or apply over-the-counter and prescription medicines only as told by your health care provider.  · Ask your health care provider about using over-the-counter mentholated ointment on your nails.  Lifestyle  · Do not share personal items, such as towels or nail clippers.  · Trim your nails often.  · Wash and dry your hands and feet every day.  · Wear absorbent socks, and change your socks frequently.  · Wear shoes that allow air to circulate, such as sandals or canvas tennis shoes. Throw out old shoes.  · Wear rubber gloves if you are working with your hands in wet areas.  · Do not walk barefoot in shower rooms or locker rooms.  · Do not use a nail salon that does not use clean instruments.  · Do not use artificial nails.  General instructions  · Keep all follow-up visits as told by your health care provider. This is important.  · Use antifungal foot powder on your feet and in your shoes.  Contact a health care provider if:  Your infection is not getting better or it is getting worse after several months.  This information is not intended to replace advice given to you by your health care provider. Make sure you discuss any questions you have with your health care provider.  Document Released: 12/15/2001 Document Revised: 05/25/2017 Document Reviewed: 06/20/2016  © 2017 [employer]

## 2018-05-23 NOTE — ED PROVIDER NOTES
"ED Provider Note    CHIEF COMPLAINT   Chief Complaint   Patient presents with   • Foot Pain     bilateral foot pain states \"my feet are burning\".    • Knee Pain     Chronic left knee pain        HPI   Korey Davis is a 58 y.o. male who presents to the emergency room today with complaints of pain and discoloration to his feet. Patient has had the symptoms for the past 1-2 weeks getting worse. No nausea no vomiting no fever chills or changes in bowel or bladder.    REVIEW OF SYSTEMS   See HPI for further details. All other systems are negative.     PAST MEDICAL HISTORY   Past Medical History:   Diagnosis Date   • Asthma    • Back pain     chronic   • COPD (chronic obstructive pulmonary disease) (Formerly [facility])        FAMILY HISTORY  No family history on file.    SOCIAL HISTORY  Social History     Social History   • Marital status: Single     Spouse name: N/A   • Number of children: N/A   • Years of education: N/A     Social History Main Topics   • Smoking status: Current Some Day Smoker     Packs/day: 0.40     Types: Cigarettes   • Smokeless tobacco: Never Used   • Alcohol use No   • Drug use: No      Comment: Notes prior methamphetamine use, Notes previously stopped use, denies current use   • Sexual activity: Not on file     Other Topics Concern   •  Service No   • Blood Transfusions No   • Caffeine Concern No   • Occupational Exposure No   • Hobby Hazards No   • Sleep Concern Yes     sometimes    • Stress Concern No   • Weight Concern No   • Special Diet No   • Back Care Yes   • Exercise No   • Bike Helmet No   • Seat Belt Yes   • Self-Exams No     Social History Narrative   • No narrative on file       SURGICAL HISTORY  Past Surgical History:   Procedure Laterality Date   • KNEE REPLACEMENT, TOTAL  12/1/2011    [facility]   • HAND SURGERY Right    • KNEE ARTHROSCOPY Left     note 6 total left knee surgeries from 2004 - 2013 in [place], including total knee replacement       CURRENT MEDICATIONS   Home " Medications     Reviewed by Apryl Maldonado R.N. (Registered Nurse) on 05/22/18 at 1750  Med List Status: Not Addressed   Medication Last Dose Status   albuterol 108 (90 BASE) MCG/ACT Aero Soln inhalation aerosol 6/30/2017 Active   gabapentin (NEURONTIN) 100 MG Cap not taking Active   hydrocodone/acetaminophen (NORCO)  MG Tab not taking Active   ibuprofen (MOTRIN) 800 MG Tab not taking Active   tiotropium (SPIRIVA) 18 MCG Cap not taking Active                ALLERGIES   No Known Allergies    PHYSICAL EXAM  VITAL SIGNS: /75   Pulse 83   Temp 36.6 °C (97.8 °F)   Resp 16   Ht 1.829 m (6')   Wt 78.4 kg (172 lb 13.5 oz)   SpO2 98%   BMI 23.44 kg/m²  Room air O2: 98    Constitutional: Well developed, Well nourished, No acute distress, Non-toxic appearance.   Cardiovascular: Normal heart rate, Normal rhythm, No murmurs, No rubs, No gallops.   Thorax & Lungs: Normal breath sounds, No respiratory distress, No wheezing, No chest tenderness.   Abdomen: Bowel sounds normal, Soft, No tenderness, No masses, No pulsatile masses.   Skin: Warm, Dry, No erythema, No rash. Patient has fungal infection onychomycosis bilaterally and athlete's foot system fungal infection would placed bilateral bunions.  Extremities: Intact distal pulses, No cyanosis, No clubbing.   Musculoskeletal: Patient is ambulatory.   Neurologic: Alert & oriented x 3, Normal motor function, Normal sensory function, No focal deficits noted.         COURSE & MEDICAL DECISION MAKING  Pertinent Labs & Imaging studies reviewed. (See chart for details)  Patient placed on Lamisil, given referral to [place] clinic, placed on Anaprox for his pain the bunions. Follow-up as directed and return if further problems.    FINAL IMPRESSION  1. Acute onychomycosis/  2. Bilateral foot pain  3. Tinea pedis      Electronically signed by: Dylan Enrique, 5/22/2018 8:55 PM

## 2018-05-29 ENCOUNTER — HOSPITAL ENCOUNTER (EMERGENCY)
Dept: HOSPITAL 8 - ED | Age: 58
Discharge: HOME | End: 2018-05-29
Payer: MEDICAID

## 2018-05-29 VITALS — WEIGHT: 176.37 LBS | BODY MASS INDEX: 23.89 KG/M2 | HEIGHT: 72 IN

## 2018-05-29 VITALS — SYSTOLIC BLOOD PRESSURE: 118 MMHG | DIASTOLIC BLOOD PRESSURE: 80 MMHG

## 2018-05-29 DIAGNOSIS — B86: Primary | ICD-10-CM

## 2018-05-29 DIAGNOSIS — F17.210: ICD-10-CM

## 2018-05-29 DIAGNOSIS — L73.9: ICD-10-CM

## 2018-05-29 DIAGNOSIS — G89.29: ICD-10-CM

## 2018-05-29 DIAGNOSIS — M19.90: ICD-10-CM

## 2018-05-29 PROCEDURE — 99283 EMERGENCY DEPT VISIT LOW MDM: CPT

## 2018-07-04 ENCOUNTER — HOSPITAL ENCOUNTER (EMERGENCY)
Dept: HOSPITAL 8 - ED | Age: 58
Discharge: HOME | End: 2018-07-04
Payer: MEDICAID

## 2018-07-04 VITALS — SYSTOLIC BLOOD PRESSURE: 141 MMHG | DIASTOLIC BLOOD PRESSURE: 84 MMHG

## 2018-07-04 VITALS — WEIGHT: 170.42 LBS | HEIGHT: 72 IN | BODY MASS INDEX: 23.08 KG/M2

## 2018-07-04 DIAGNOSIS — I38: ICD-10-CM

## 2018-07-04 DIAGNOSIS — R82.99: ICD-10-CM

## 2018-07-04 DIAGNOSIS — J45.909: ICD-10-CM

## 2018-07-04 DIAGNOSIS — K52.9: Primary | ICD-10-CM

## 2018-07-04 DIAGNOSIS — G89.29: ICD-10-CM

## 2018-07-04 LAB
ALBUMIN SERPL-MCNC: 3.6 G/DL (ref 3.4–5)
ALP SERPL-CCNC: 74 U/L (ref 45–117)
ALT SERPL-CCNC: 25 U/L (ref 12–78)
ANION GAP SERPL CALC-SCNC: 5 MMOL/L (ref 5–15)
BASOPHILS # BLD AUTO: 0.05 X10^3/UL (ref 0–0.1)
BASOPHILS NFR BLD AUTO: 1 % (ref 0–1)
BILIRUB SERPL-MCNC: 0.4 MG/DL (ref 0.2–1)
CALCIUM SERPL-MCNC: 8.7 MG/DL (ref 8.5–10.1)
CHLORIDE SERPL-SCNC: 105 MMOL/L (ref 98–107)
CREAT SERPL-MCNC: 1 MG/DL (ref 0.7–1.3)
CULTURE INDICATED?: YES
EOSINOPHIL # BLD AUTO: 0.06 X10^3/UL (ref 0–0.4)
EOSINOPHIL NFR BLD AUTO: 1 % (ref 1–7)
ERYTHROCYTE [DISTWIDTH] IN BLOOD BY AUTOMATED COUNT: 14 % (ref 9.4–14.8)
LYMPHOCYTES # BLD AUTO: 2.03 X10^3/UL (ref 1–3.4)
LYMPHOCYTES NFR BLD AUTO: 39 % (ref 22–44)
MCH RBC QN AUTO: 29.6 PG (ref 27.5–34.5)
MCHC RBC AUTO-ENTMCNC: 33.8 G/DL (ref 33.2–36.2)
MCV RBC AUTO: 87.5 FL (ref 81–97)
MD: NO
MICROSCOPIC: (no result)
MONOCYTES # BLD AUTO: 0.54 X10^3/UL (ref 0.2–0.8)
MONOCYTES NFR BLD AUTO: 10 % (ref 2–9)
NEUTROPHILS # BLD AUTO: 2.5 X10^3/UL (ref 1.8–6.8)
NEUTROPHILS NFR BLD AUTO: 48 % (ref 42–75)
PLATELET # BLD AUTO: 296 X10^3/UL (ref 130–400)
PMV BLD AUTO: 7.4 FL (ref 7.4–10.4)
PROT SERPL-MCNC: 7 G/DL (ref 6.4–8.2)
RBC # BLD AUTO: 4.6 X10^6/UL (ref 4.38–5.82)

## 2018-07-04 PROCEDURE — 87086 URINE CULTURE/COLONY COUNT: CPT

## 2018-07-04 PROCEDURE — 74177 CT ABD & PELVIS W/CONTRAST: CPT

## 2018-07-04 PROCEDURE — 81001 URINALYSIS AUTO W/SCOPE: CPT

## 2018-07-04 PROCEDURE — 74021 RADEX ABDOMEN 3+ VIEWS: CPT

## 2018-07-04 PROCEDURE — 85025 COMPLETE CBC W/AUTO DIFF WBC: CPT

## 2018-07-04 PROCEDURE — 36415 COLL VENOUS BLD VENIPUNCTURE: CPT

## 2018-07-04 PROCEDURE — 99285 EMERGENCY DEPT VISIT HI MDM: CPT

## 2018-07-04 PROCEDURE — 83690 ASSAY OF LIPASE: CPT

## 2018-07-04 PROCEDURE — 80053 COMPREHEN METABOLIC PANEL: CPT

## 2018-07-24 ENCOUNTER — APPOINTMENT (OUTPATIENT)
Dept: RADIOLOGY | Facility: MEDICAL CENTER | Age: 58
End: 2018-07-24
Attending: EMERGENCY MEDICINE
Payer: MEDICAID

## 2018-07-24 ENCOUNTER — HOSPITAL ENCOUNTER (EMERGENCY)
Facility: MEDICAL CENTER | Age: 58
End: 2018-07-24
Attending: EMERGENCY MEDICINE
Payer: MEDICAID

## 2018-07-24 VITALS
HEART RATE: 95 BPM | BODY MASS INDEX: 23.7 KG/M2 | HEIGHT: 72 IN | OXYGEN SATURATION: 98 % | TEMPERATURE: 97.3 F | SYSTOLIC BLOOD PRESSURE: 119 MMHG | RESPIRATION RATE: 19 BRPM | DIASTOLIC BLOOD PRESSURE: 78 MMHG | WEIGHT: 175 LBS

## 2018-07-24 DIAGNOSIS — M25.562 ACUTE PAIN OF LEFT KNEE: ICD-10-CM

## 2018-07-24 DIAGNOSIS — M25.462 KNEE EFFUSION, LEFT: ICD-10-CM

## 2018-07-24 PROCEDURE — 73562 X-RAY EXAM OF KNEE 3: CPT | Mod: LT

## 2018-07-24 PROCEDURE — 700102 HCHG RX REV CODE 250 W/ 637 OVERRIDE(OP): Performed by: EMERGENCY MEDICINE

## 2018-07-24 PROCEDURE — 99284 EMERGENCY DEPT VISIT MOD MDM: CPT

## 2018-07-24 PROCEDURE — A9270 NON-COVERED ITEM OR SERVICE: HCPCS | Performed by: EMERGENCY MEDICINE

## 2018-07-24 RX ORDER — HYDROCODONE BITARTRATE AND ACETAMINOPHEN 5; 325 MG/1; MG/1
1-2 TABLET ORAL EVERY 6 HOURS PRN
Qty: 15 TAB | Refills: 0 | Status: SHIPPED | OUTPATIENT
Start: 2018-07-24 | End: 2018-07-27

## 2018-07-24 RX ORDER — HYDROCODONE BITARTRATE AND ACETAMINOPHEN 7.5; 325 MG/1; MG/1
1 TABLET ORAL ONCE
Status: COMPLETED | OUTPATIENT
Start: 2018-07-24 | End: 2018-07-24

## 2018-07-24 RX ADMIN — HYDROCODONE BITARTRATE AND ACETAMINOPHEN 1 TABLET: 7.5; 325 TABLET ORAL at 18:52

## 2018-07-24 ASSESSMENT — LIFESTYLE VARIABLES: DO YOU DRINK ALCOHOL: NO

## 2018-07-24 NOTE — ED TRIAGE NOTES
Chief Complaint   Patient presents with   • Low Back Pain   • Knee Pain     Pt having left low back and left knee pain since yesterday. Denies injury.   /69   Pulse (!) 113   Temp 36.3 °C (97.3 °F) (Oral)   Resp (!) 26   Ht 1.829 m (6')   Wt 79.4 kg (175 lb)   SpO2 98%   BMI 23.73 kg/m²   Pt placed back in lobby, educated on triage process, and told to inform staff of any change in condition.

## 2018-07-25 NOTE — ED PROVIDER NOTES
"ED Provider Note    CHIEF COMPLAINT  Chief Complaint   Patient presents with   • Low Back Pain   • Knee Pain       [facility]  Korey Davis is a 58 y.o. male who presents with complaints of left knee pain and low back pain for the past 2 days. Patient has history of chronic back pain, and was seen Dr. Kamara of pain management last year. The patient says he has seen no doctors over the last 6 months because he does not go to the doctor if he is not having any problems. The patient has had multiple surgeries to the left knee, including a total knee replacement in [place].   Last year the patient was having problems with the knee, and was referred to [facility] by his PCP Dr. Justin. The patient said he had one visit there, and then stopped going because it was not bothering him any longer. The patient has had no fever, chills, sore throat, cough, bun, or diarrhea. He denies any urinary symptoms or any incontinence of urine. The patient says the back pain is to the left lower back. He has had similar back pain in the past. He says there is a \"knot\" in his back.    REVIEW OF SYSTEMS  See HPI for further details. All other systems are negative.     PAST MEDICAL HISTORY  Past Medical History:   Diagnosis Date   • Asthma    • Back pain     chronic   • COPD (chronic obstructive pulmonary disease) (Formerly [facility])        FAMILY HISTORY  History reviewed. No pertinent family history.    SOCIAL HISTORY  Social History     Social History   • Marital status: Single     Spouse name: N/A   • Number of children: N/A   • Years of education: N/A     Social History Main Topics   • Smoking status: Current Some Day Smoker     Packs/day: 0.40     Types: Cigarettes   • Smokeless tobacco: Never Used   • Alcohol use No   • Drug use: No      Comment: Notes prior methamphetamine use, Notes previously stopped use, denies current use   • Sexual activity: Not on file     Other Topics Concern   •  Service No   • Blood Transfusions No   • " Caffeine Concern No   • Occupational Exposure No   • Hobby Hazards No   • Sleep Concern Yes     sometimes    • Stress Concern No   • Weight Concern No   • Special Diet No   • Back Care Yes   • Exercise No   • Bike Helmet No   • Seat Belt Yes   • Self-Exams No     Social History Narrative   • No narrative on file       SURGICAL HISTORY  Past Surgical History:   Procedure Laterality Date   • KNEE REPLACEMENT, TOTAL  12/1/2011    [facility]   • HAND SURGERY Right    • KNEE ARTHROSCOPY Left     note 6 total left knee surgeries from 2004 - 2013 in [place], including total knee replacement       CURRENT MEDICATIONS  Home Medications     Reviewed by Sanford Saavedra R.N. (Registered Nurse) on 07/24/18 at 1730  Med List Status: Not Addressed   Medication Last Dose Status   albuterol 108 (90 BASE) MCG/ACT Aero Soln inhalation aerosol 6/30/2017 Active   gabapentin (NEURONTIN) 100 MG Cap not taking Active   gabapentin (NEURONTIN) 100 MG Cap  Active   hydrocodone/acetaminophen (NORCO)  MG Tab not taking Active   ibuprofen (MOTRIN) 800 MG Tab not taking Active   naproxen sodium (ANAPROX) 550 MG tablet  Active   Terbinafine HCl (LAMISIL SPRAY) 1 % Solution  Active   tiotropium (SPIRIVA) 18 MCG Cap not taking Active                ALLERGIES  No Known Allergies    PHYSICAL EXAM  VITAL SIGNS: /69   Pulse (!) 113   Temp 36.3 °C (97.3 °F) (Oral)   Resp (!) 26   Ht 1.829 m (6')   Wt 79.4 kg (175 lb)   SpO2 98%   BMI 23.73 kg/m²   Constitutional: Awake, alert, in no acute distress, Non-toxic appearance.   HENT: Atraumatic. Bilateral external ears normal, mucous membranes moist, throat nonerythematous without exudates, nose is normal.  Eyes: PERRL, EOMI, conjunctiva moist, noninjected.  Neck: Nontender, Normal range of motion, No nuchal rigidity, No stridor.   Lymphatic: No lymphadenopathy noted.   Cardiovascular: Regular rate and rhythm, no murmurs, rubs, gallops.  Thorax & Lungs:  Good breath sounds  bilaterally, no wheezes, rales, or retractions.  No chest tenderness.  Abdomen: Bowel sounds normal, Soft, nontender, nondistended, no rebound, guarding, masses.  Back: No CVA or spinal tenderness. There is tenderness to the left lower paralumbar spinous area, which reproduces his pain.  Extremities: Intact distal pulses, No edema, there is chronic bony deformity noted to the left knee with a well-healed incision. There appears to be a mild joint effusion, with no runny erythema, induration, or increased warmth.  There is decreased range of motion on both active and passive flexion/extension. There is no tenderness to left foot, ankle, or hip.  Skin: Warm, Dry, No rashes.   Musculoskeletal: No joint swelling or tenderness to the left knee.  Neurologic: Alert & oriented x 3, sensory and motor function normal. No focal deficits.   Psychiatric: Affect normal, Judgment normal, Mood normal.         RADIOLOGY/PROCEDURES  DX-KNEE 3 VIEWS LEFT   Final Result      No acute fracture or dislocation is seen.      Post surgical changes status post left knee arthroplasty. Lucency surrounding the proximal aspect of the femoral component may be related to loosening.      Knee joint effusion.               COURSE & MEDICAL DECISION MAKING  Pertinent Labs & Imaging studies reviewed. (See chart for details)  The patient presents with the above complaints. On review of his old chart, his back pain and knee pain to be chronic in nature. The patient was given a Norco for pain. X-rays of the left knee shows above findings with no fracture or dislocation. There is some question of possible loosening of the proximal aspect of the femoral component. The patient's back pain appears to be chronic. He has no spinal tenderness on palpation, and is neurologically intact. I do not feel any imaging of the back is necessary at this time. Findings were discussed the patient. He is given a knee immobilizer for support. He is referred to Dr. Camarena of  orthopedics for follow-up and is to call the office tomorrow. He is to return to the ER for any worsening pain, redness, swelling, fever, or any other problems. He is told to use over-the-counter ibuprofen, and is given an Rx for Norco.    Patient is low risk on the opiate risk tool.  [place] 1 prescription for hydrocodone in the past one year.  In prescribing controlled substances to this patient, I certify that I have obtained and reviewed the medical history of Korey Davis. I have also made a good luis effort to obtain applicable records from other providers who have treated the patient and records did not demonstrate any increased risk of substance abuse that would prevent me from prescribing controlled substances.     I have conducted a physical exam and documented it. I have reviewed Mr. Davis’s prescription history as maintained by the [place] Prescription Monitoring Program.     I have assessed the patient’s risk for abuse, dependency, and addiction using the validated Opioid Risk Tool available at https://www.mdcalc.com/vrilmf-tnel-izqw-ort-narcotic-abuse.     Given the above, I believe the benefits of controlled substance therapy outweigh the risks. The reasons for prescribing controlled substances include non-narcotic, oral analgesic alternatives have been inadequate for pain control. Accordingly, I have discussed the risk and benefits, treatment plan, and alternative therapies with the patient.         FINAL IMPRESSION  1. Left knee pain  2. Left knee effusion  3. Chronic low back pain         Electronically signed by: Kiran Lofton, 7/24/2018 6:38 PM

## 2018-08-17 DIAGNOSIS — Z01.810 PRE-OPERATIVE CARDIOVASCULAR EXAMINATION: ICD-10-CM

## 2018-08-17 DIAGNOSIS — Z01.812 PRE-OPERATIVE LABORATORY EXAMINATION: ICD-10-CM

## 2018-08-17 LAB
ABO GROUP BLD: NORMAL
ANION GAP SERPL CALC-SCNC: 6 MMOL/L (ref 0–11.9)
APTT PPP: 35 SEC (ref 24.7–36)
BASOPHILS # BLD AUTO: 0.9 % (ref 0–1.8)
BASOPHILS # BLD: 0.05 K/UL (ref 0–0.12)
BLD GP AB SCN SERPL QL: NORMAL
BUN SERPL-MCNC: 12 MG/DL (ref 8–22)
CALCIUM SERPL-MCNC: 9.1 MG/DL (ref 8.5–10.5)
CHLORIDE SERPL-SCNC: 108 MMOL/L (ref 96–112)
CO2 SERPL-SCNC: 27 MMOL/L (ref 20–33)
CREAT SERPL-MCNC: 0.88 MG/DL (ref 0.5–1.4)
EKG IMPRESSION: NORMAL
EOSINOPHIL # BLD AUTO: 0.15 K/UL (ref 0–0.51)
EOSINOPHIL NFR BLD: 2.7 % (ref 0–6.9)
ERYTHROCYTE [DISTWIDTH] IN BLOOD BY AUTOMATED COUNT: 44.5 FL (ref 35.9–50)
EST. AVERAGE GLUCOSE BLD GHB EST-MCNC: 134 MG/DL
GLUCOSE SERPL-MCNC: 85 MG/DL (ref 65–99)
HBA1C MFR BLD: 6.3 % (ref 0–5.6)
HCT VFR BLD AUTO: 40.3 % (ref 42–52)
HGB BLD-MCNC: 13 G/DL (ref 14–18)
IMM GRANULOCYTES # BLD AUTO: 0.01 K/UL (ref 0–0.11)
IMM GRANULOCYTES NFR BLD AUTO: 0.2 % (ref 0–0.9)
INR PPP: 1.16 (ref 0.87–1.13)
LYMPHOCYTES # BLD AUTO: 2.08 K/UL (ref 1–4.8)
LYMPHOCYTES NFR BLD: 37.8 % (ref 22–41)
MCH RBC QN AUTO: 28.7 PG (ref 27–33)
MCHC RBC AUTO-ENTMCNC: 32.3 G/DL (ref 33.7–35.3)
MCV RBC AUTO: 89 FL (ref 81.4–97.8)
MONOCYTES # BLD AUTO: 0.46 K/UL (ref 0–0.85)
MONOCYTES NFR BLD AUTO: 8.4 % (ref 0–13.4)
NEUTROPHILS # BLD AUTO: 2.75 K/UL (ref 1.82–7.42)
NEUTROPHILS NFR BLD: 50 % (ref 44–72)
NRBC # BLD AUTO: 0 K/UL
NRBC BLD-RTO: 0 /100 WBC
PLATELET # BLD AUTO: 345 K/UL (ref 164–446)
PMV BLD AUTO: 9.6 FL (ref 9–12.9)
POTASSIUM SERPL-SCNC: 3.5 MMOL/L (ref 3.6–5.5)
PROTHROMBIN TIME: 14.5 SEC (ref 12–14.6)
RBC # BLD AUTO: 4.53 M/UL (ref 4.7–6.1)
RH BLD: NORMAL
SCCMEC + MECA PNL NOSE NAA+PROBE: NEGATIVE
SCCMEC + MECA PNL NOSE NAA+PROBE: POSITIVE
SODIUM SERPL-SCNC: 141 MMOL/L (ref 135–145)
WBC # BLD AUTO: 5.5 K/UL (ref 4.8–10.8)

## 2018-08-17 PROCEDURE — 86900 BLOOD TYPING SEROLOGIC ABO: CPT

## 2018-08-17 PROCEDURE — 36415 COLL VENOUS BLD VENIPUNCTURE: CPT

## 2018-08-17 PROCEDURE — 87640 STAPH A DNA AMP PROBE: CPT | Mod: XU

## 2018-08-17 PROCEDURE — 80048 BASIC METABOLIC PNL TOTAL CA: CPT

## 2018-08-17 PROCEDURE — 87641 MR-STAPH DNA AMP PROBE: CPT

## 2018-08-17 PROCEDURE — 83036 HEMOGLOBIN GLYCOSYLATED A1C: CPT

## 2018-08-17 PROCEDURE — 85610 PROTHROMBIN TIME: CPT

## 2018-08-17 PROCEDURE — 85730 THROMBOPLASTIN TIME PARTIAL: CPT

## 2018-08-17 PROCEDURE — 86901 BLOOD TYPING SEROLOGIC RH(D): CPT

## 2018-08-17 PROCEDURE — 86850 RBC ANTIBODY SCREEN: CPT

## 2018-08-17 PROCEDURE — 93005 ELECTROCARDIOGRAM TRACING: CPT

## 2018-08-17 PROCEDURE — 85025 COMPLETE CBC W/AUTO DIFF WBC: CPT

## 2018-08-17 PROCEDURE — 93010 ELECTROCARDIOGRAM REPORT: CPT | Performed by: INTERNAL MEDICINE

## 2018-08-17 NOTE — DISCHARGE PLANNING
DISCHARGE PLANNING NOTE - TOTAL JOINT     Procedure: Procedure(s):  KNEE REVISION TOTAL  Procedure Date: 8/21/2018  Insurance:  Payor: MEDICAID FFS / Plan: [place] MEDICAID   Equipment currently available at home? None  Steps into the home? 5 stairs  Steps within the home? 2 story, stays on main level  Toilet height? Standard  Type of shower? tub-shower  Who will be with you during your recovery? other: lives alone  Is Outpatient Physical Therapy set up after surgery? No   Did you take the Total Joint Class and where? No, has had multiple left knee procedures     Plan: Antonina states that his doctor plans a 2 stage knee revision. First he will remove all hardware, place a spacer and take cultures. Dr. Boston told him he will need to go to a SNF for antibiotics and rehab with NWB status. He will have a total knee arthroplasty after that is completed. He signed a SNF choice form indicating [facility] as 1st choice and also signed that it was OK to send the referral to all SNFs. Discussed equipment for home he stated he did not want to use any.

## 2018-08-20 RX ORDER — CEFAZOLIN SODIUM 1 G/50ML
1 INJECTION, SOLUTION INTRAVENOUS
Status: ACTIVE | OUTPATIENT
Start: 2018-08-20 | End: 2018-08-21

## 2018-08-21 ENCOUNTER — APPOINTMENT (OUTPATIENT)
Dept: RADIOLOGY | Facility: MEDICAL CENTER | Age: 58
DRG: 468 | End: 2018-08-21
Attending: STUDENT IN AN ORGANIZED HEALTH CARE EDUCATION/TRAINING PROGRAM
Payer: MEDICAID

## 2018-08-21 ENCOUNTER — HOSPITAL ENCOUNTER (INPATIENT)
Facility: MEDICAL CENTER | Age: 58
LOS: 9 days | DRG: 468 | End: 2018-08-30
Attending: ORTHOPAEDIC SURGERY | Admitting: ORTHOPAEDIC SURGERY
Payer: MEDICAID

## 2018-08-21 DIAGNOSIS — T84.54XD INFECTION OF TOTAL LEFT KNEE REPLACEMENT, SUBSEQUENT ENCOUNTER: ICD-10-CM

## 2018-08-21 LAB
ABO GROUP BLD: NORMAL
RH BLD: NORMAL

## 2018-08-21 PROCEDURE — 700111 HCHG RX REV CODE 636 W/ 250 OVERRIDE (IP)

## 2018-08-21 PROCEDURE — C1713 ANCHOR/SCREW BN/BN,TIS/BN: HCPCS | Performed by: ORTHOPAEDIC SURGERY

## 2018-08-21 PROCEDURE — 87205 SMEAR GRAM STAIN: CPT | Mod: 91

## 2018-08-21 PROCEDURE — L1830 KO IMMOB CANVAS LONG PRE OTS: HCPCS | Performed by: ORTHOPAEDIC SURGERY

## 2018-08-21 PROCEDURE — 160041 HCHG SURGERY MINUTES - EA ADDL 1 MIN LEVEL 4: Performed by: ORTHOPAEDIC SURGERY

## 2018-08-21 PROCEDURE — 700105 HCHG RX REV CODE 258: Performed by: ORTHOPAEDIC SURGERY

## 2018-08-21 PROCEDURE — 160029 HCHG SURGERY MINUTES - 1ST 30 MINS LEVEL 4: Performed by: ORTHOPAEDIC SURGERY

## 2018-08-21 PROCEDURE — 501838 HCHG SUTURE GENERAL: Performed by: ORTHOPAEDIC SURGERY

## 2018-08-21 PROCEDURE — 160002 HCHG RECOVERY MINUTES (STAT): Performed by: ORTHOPAEDIC SURGERY

## 2018-08-21 PROCEDURE — A9270 NON-COVERED ITEM OR SERVICE: HCPCS | Performed by: STUDENT IN AN ORGANIZED HEALTH CARE EDUCATION/TRAINING PROGRAM

## 2018-08-21 PROCEDURE — 87070 CULTURE OTHR SPECIMN AEROBIC: CPT | Mod: 91

## 2018-08-21 PROCEDURE — 87075 CULTR BACTERIA EXCEPT BLOOD: CPT

## 2018-08-21 PROCEDURE — 87015 SPECIMEN INFECT AGNT CONCNTJ: CPT

## 2018-08-21 PROCEDURE — A9270 NON-COVERED ITEM OR SERVICE: HCPCS

## 2018-08-21 PROCEDURE — 160048 HCHG OR STATISTICAL LEVEL 1-5: Performed by: ORTHOPAEDIC SURGERY

## 2018-08-21 PROCEDURE — 700112 HCHG RX REV CODE 229: Performed by: STUDENT IN AN ORGANIZED HEALTH CARE EDUCATION/TRAINING PROGRAM

## 2018-08-21 PROCEDURE — 160022 HCHG BLOCK: Performed by: ORTHOPAEDIC SURGERY

## 2018-08-21 PROCEDURE — 700111 HCHG RX REV CODE 636 W/ 250 OVERRIDE (IP): Performed by: STUDENT IN AN ORGANIZED HEALTH CARE EDUCATION/TRAINING PROGRAM

## 2018-08-21 PROCEDURE — 770001 HCHG ROOM/CARE - MED/SURG/GYN PRIV*

## 2018-08-21 PROCEDURE — 700102 HCHG RX REV CODE 250 W/ 637 OVERRIDE(OP): Performed by: STUDENT IN AN ORGANIZED HEALTH CARE EDUCATION/TRAINING PROGRAM

## 2018-08-21 PROCEDURE — 0SPD0JZ REMOVAL OF SYNTHETIC SUBSTITUTE FROM LEFT KNEE JOINT, OPEN APPROACH: ICD-10-PCS | Performed by: ORTHOPAEDIC SURGERY

## 2018-08-21 PROCEDURE — 700101 HCHG RX REV CODE 250

## 2018-08-21 PROCEDURE — 502579 HCHG PACK, TOTAL KNEE: Performed by: ORTHOPAEDIC SURGERY

## 2018-08-21 PROCEDURE — 0SRD0J9 REPLACEMENT OF LEFT KNEE JOINT WITH SYNTHETIC SUBSTITUTE, CEMENTED, OPEN APPROACH: ICD-10-PCS | Performed by: ORTHOPAEDIC SURGERY

## 2018-08-21 PROCEDURE — 700105 HCHG RX REV CODE 258: Performed by: STUDENT IN AN ORGANIZED HEALTH CARE EDUCATION/TRAINING PROGRAM

## 2018-08-21 PROCEDURE — C1776 JOINT DEVICE (IMPLANTABLE): HCPCS | Performed by: ORTHOPAEDIC SURGERY

## 2018-08-21 PROCEDURE — 700101 HCHG RX REV CODE 250: Performed by: ORTHOPAEDIC SURGERY

## 2018-08-21 PROCEDURE — 700102 HCHG RX REV CODE 250 W/ 637 OVERRIDE(OP)

## 2018-08-21 PROCEDURE — 700101 HCHG RX REV CODE 250: Performed by: STUDENT IN AN ORGANIZED HEALTH CARE EDUCATION/TRAINING PROGRAM

## 2018-08-21 PROCEDURE — 160035 HCHG PACU - 1ST 60 MINS PHASE I: Performed by: ORTHOPAEDIC SURGERY

## 2018-08-21 PROCEDURE — 0SBD0ZZ EXCISION OF LEFT KNEE JOINT, OPEN APPROACH: ICD-10-PCS | Performed by: ORTHOPAEDIC SURGERY

## 2018-08-21 PROCEDURE — 160009 HCHG ANES TIME/MIN: Performed by: ORTHOPAEDIC SURGERY

## 2018-08-21 PROCEDURE — 73560 X-RAY EXAM OF KNEE 1 OR 2: CPT | Mod: LT

## 2018-08-21 PROCEDURE — 502779 HCHG SUTURE, QUILL: Performed by: ORTHOPAEDIC SURGERY

## 2018-08-21 PROCEDURE — L8699 PROSTHETIC IMPLANT NOS: HCPCS | Performed by: ORTHOPAEDIC SURGERY

## 2018-08-21 PROCEDURE — 502000 HCHG MISC OR IMPLANTS RC 0278: Performed by: ORTHOPAEDIC SURGERY

## 2018-08-21 DEVICE — IMPLANTABLE DEVICE: Type: IMPLANTABLE DEVICE | Site: KNEE | Status: FUNCTIONAL

## 2018-08-21 DEVICE — CEMENT ORTHOPEDIC HV US  (10/PK): Type: IMPLANTABLE DEVICE | Site: KNEE | Status: FUNCTIONAL

## 2018-08-21 RX ORDER — DIPHENHYDRAMINE HCL 25 MG
25 TABLET ORAL EVERY 6 HOURS PRN
Status: DISCONTINUED | OUTPATIENT
Start: 2018-08-21 | End: 2018-08-30 | Stop reason: HOSPADM

## 2018-08-21 RX ORDER — ZOLPIDEM TARTRATE 5 MG/1
5 TABLET ORAL NIGHTLY PRN
Status: DISCONTINUED | OUTPATIENT
Start: 2018-08-22 | End: 2018-08-30 | Stop reason: HOSPADM

## 2018-08-21 RX ORDER — TAMSULOSIN HYDROCHLORIDE 0.4 MG/1
0.4 CAPSULE ORAL
Status: DISCONTINUED | OUTPATIENT
Start: 2018-08-21 | End: 2018-08-30 | Stop reason: HOSPADM

## 2018-08-21 RX ORDER — TRAMADOL HYDROCHLORIDE 50 MG/1
50 TABLET ORAL EVERY 4 HOURS PRN
Status: DISCONTINUED | OUTPATIENT
Start: 2018-08-21 | End: 2018-08-30 | Stop reason: HOSPADM

## 2018-08-21 RX ORDER — POLYETHYLENE GLYCOL 3350 17 G/17G
1 POWDER, FOR SOLUTION ORAL 2 TIMES DAILY PRN
Status: DISCONTINUED | OUTPATIENT
Start: 2018-08-21 | End: 2018-08-30 | Stop reason: HOSPADM

## 2018-08-21 RX ORDER — SODIUM CHLORIDE, SODIUM LACTATE, POTASSIUM CHLORIDE, CALCIUM CHLORIDE 600; 310; 30; 20 MG/100ML; MG/100ML; MG/100ML; MG/100ML
INJECTION, SOLUTION INTRAVENOUS CONTINUOUS
Status: DISCONTINUED | OUTPATIENT
Start: 2018-08-21 | End: 2018-08-23

## 2018-08-21 RX ORDER — OXYCODONE HCL 5 MG/5 ML
SOLUTION, ORAL ORAL
Status: COMPLETED
Start: 2018-08-21 | End: 2018-08-21

## 2018-08-21 RX ORDER — TOBRAMYCIN 1.2 G/30ML
INJECTION, POWDER, LYOPHILIZED, FOR SOLUTION INTRAVENOUS
Status: DISCONTINUED | OUTPATIENT
Start: 2018-08-21 | End: 2018-08-21 | Stop reason: HOSPADM

## 2018-08-21 RX ORDER — AMOXICILLIN 250 MG
1 CAPSULE ORAL NIGHTLY
Status: DISCONTINUED | OUTPATIENT
Start: 2018-08-21 | End: 2018-08-30 | Stop reason: HOSPADM

## 2018-08-21 RX ORDER — ACETAMINOPHEN 500 MG
TABLET ORAL
Status: COMPLETED
Start: 2018-08-21 | End: 2018-08-21

## 2018-08-21 RX ORDER — LIDOCAINE HYDROCHLORIDE 10 MG/ML
0.5 INJECTION, SOLUTION INFILTRATION; PERINEURAL
Status: ACTIVE | OUTPATIENT
Start: 2018-08-21 | End: 2018-08-22

## 2018-08-21 RX ORDER — ONDANSETRON 2 MG/ML
4 INJECTION INTRAMUSCULAR; INTRAVENOUS EVERY 4 HOURS PRN
Status: DISCONTINUED | OUTPATIENT
Start: 2018-08-21 | End: 2018-08-30 | Stop reason: HOSPADM

## 2018-08-21 RX ORDER — DIPHENHYDRAMINE HYDROCHLORIDE 50 MG/ML
25 INJECTION INTRAMUSCULAR; INTRAVENOUS EVERY 6 HOURS PRN
Status: DISCONTINUED | OUTPATIENT
Start: 2018-08-21 | End: 2018-08-30 | Stop reason: HOSPADM

## 2018-08-21 RX ORDER — GABAPENTIN 300 MG/1
CAPSULE ORAL
Status: COMPLETED
Start: 2018-08-21 | End: 2018-08-21

## 2018-08-21 RX ORDER — AMOXICILLIN 250 MG
1 CAPSULE ORAL
Status: DISCONTINUED | OUTPATIENT
Start: 2018-08-21 | End: 2018-08-30 | Stop reason: HOSPADM

## 2018-08-21 RX ORDER — CHLORPROMAZINE HYDROCHLORIDE 10 MG/1
25 TABLET, FILM COATED ORAL EVERY 6 HOURS PRN
Status: DISCONTINUED | OUTPATIENT
Start: 2018-08-21 | End: 2018-08-30 | Stop reason: HOSPADM

## 2018-08-21 RX ORDER — CEFAZOLIN SODIUM 2 G/100ML
2 INJECTION, SOLUTION INTRAVENOUS EVERY 8 HOURS
Status: DISCONTINUED | OUTPATIENT
Start: 2018-08-21 | End: 2018-08-23

## 2018-08-21 RX ORDER — DEXAMETHASONE SODIUM PHOSPHATE 4 MG/ML
4 INJECTION, SOLUTION INTRA-ARTICULAR; INTRALESIONAL; INTRAMUSCULAR; INTRAVENOUS; SOFT TISSUE
Status: DISCONTINUED | OUTPATIENT
Start: 2018-08-21 | End: 2018-08-30 | Stop reason: HOSPADM

## 2018-08-21 RX ORDER — ACETAMINOPHEN 500 MG
1000 TABLET ORAL EVERY 6 HOURS
Status: DISPENSED | OUTPATIENT
Start: 2018-08-21 | End: 2018-08-26

## 2018-08-21 RX ORDER — BISACODYL 10 MG
10 SUPPOSITORY, RECTAL RECTAL
Status: DISCONTINUED | OUTPATIENT
Start: 2018-08-21 | End: 2018-08-30 | Stop reason: HOSPADM

## 2018-08-21 RX ORDER — GABAPENTIN 300 MG/1
300 CAPSULE ORAL
Status: COMPLETED | OUTPATIENT
Start: 2018-08-21 | End: 2018-08-21

## 2018-08-21 RX ORDER — ENEMA 19; 7 G/133ML; G/133ML
1 ENEMA RECTAL
Status: DISCONTINUED | OUTPATIENT
Start: 2018-08-21 | End: 2018-08-30 | Stop reason: HOSPADM

## 2018-08-21 RX ORDER — CELECOXIB 200 MG/1
CAPSULE ORAL
Status: COMPLETED
Start: 2018-08-21 | End: 2018-08-21

## 2018-08-21 RX ORDER — CHLORPROMAZINE HYDROCHLORIDE 25 MG/ML
25 INJECTION INTRAMUSCULAR EVERY 6 HOURS PRN
Status: DISCONTINUED | OUTPATIENT
Start: 2018-08-21 | End: 2018-08-30 | Stop reason: HOSPADM

## 2018-08-21 RX ORDER — HALOPERIDOL 5 MG/ML
1 INJECTION INTRAMUSCULAR EVERY 6 HOURS PRN
Status: DISCONTINUED | OUTPATIENT
Start: 2018-08-21 | End: 2018-08-30 | Stop reason: HOSPADM

## 2018-08-21 RX ORDER — KETOROLAC TROMETHAMINE 30 MG/ML
30 INJECTION, SOLUTION INTRAMUSCULAR; INTRAVENOUS EVERY 6 HOURS
Status: DISPENSED | OUTPATIENT
Start: 2018-08-21 | End: 2018-08-22

## 2018-08-21 RX ORDER — SCOLOPAMINE TRANSDERMAL SYSTEM 1 MG/1
1 PATCH, EXTENDED RELEASE TRANSDERMAL
Status: DISCONTINUED | OUTPATIENT
Start: 2018-08-21 | End: 2018-08-30 | Stop reason: HOSPADM

## 2018-08-21 RX ORDER — CELECOXIB 200 MG/1
200 CAPSULE ORAL
Status: COMPLETED | OUTPATIENT
Start: 2018-08-21 | End: 2018-08-21

## 2018-08-21 RX ORDER — CELECOXIB 200 MG/1
200 CAPSULE ORAL 2 TIMES DAILY WITH MEALS
Status: DISPENSED | OUTPATIENT
Start: 2018-08-22 | End: 2018-08-26

## 2018-08-21 RX ORDER — ALBUTEROL SULFATE 90 UG/1
2 AEROSOL, METERED RESPIRATORY (INHALATION) EVERY 6 HOURS PRN
Status: DISCONTINUED | OUTPATIENT
Start: 2018-08-21 | End: 2018-08-30 | Stop reason: HOSPADM

## 2018-08-21 RX ORDER — DOCUSATE SODIUM 100 MG/1
100 CAPSULE, LIQUID FILLED ORAL 2 TIMES DAILY
Status: DISCONTINUED | OUTPATIENT
Start: 2018-08-21 | End: 2018-08-30 | Stop reason: HOSPADM

## 2018-08-21 RX ORDER — MAGNESIUM HYDROXIDE 1200 MG/15ML
LIQUID ORAL
Status: COMPLETED | OUTPATIENT
Start: 2018-08-21 | End: 2018-08-21

## 2018-08-21 RX ORDER — OXYCODONE HYDROCHLORIDE 10 MG/1
10 TABLET ORAL EVERY 4 HOURS PRN
Status: DISCONTINUED | OUTPATIENT
Start: 2018-08-21 | End: 2018-08-30 | Stop reason: HOSPADM

## 2018-08-21 RX ORDER — DEXTROSE, SODIUM CHLORIDE, SODIUM LACTATE, POTASSIUM CHLORIDE, AND CALCIUM CHLORIDE 5; .6; .31; .03; .02 G/100ML; G/100ML; G/100ML; G/100ML; G/100ML
INJECTION, SOLUTION INTRAVENOUS CONTINUOUS
Status: DISCONTINUED | OUTPATIENT
Start: 2018-08-21 | End: 2018-08-30 | Stop reason: HOSPADM

## 2018-08-21 RX ORDER — ACETAMINOPHEN 500 MG
1000 TABLET ORAL
Status: COMPLETED | OUTPATIENT
Start: 2018-08-21 | End: 2018-08-21

## 2018-08-21 RX ORDER — OXYCODONE HYDROCHLORIDE 5 MG/1
5 TABLET ORAL EVERY 4 HOURS PRN
Status: DISCONTINUED | OUTPATIENT
Start: 2018-08-21 | End: 2018-08-30 | Stop reason: HOSPADM

## 2018-08-21 RX ORDER — VANCOMYCIN HCL 900 MCG/MG
POWDER (GRAM) MISCELLANEOUS
Status: DISCONTINUED | OUTPATIENT
Start: 2018-08-21 | End: 2018-08-21 | Stop reason: HOSPADM

## 2018-08-21 RX ADMIN — ACETAMINOPHEN 1000 MG: 500 TABLET, FILM COATED ORAL at 13:15

## 2018-08-21 RX ADMIN — DOCUSATE SODIUM -SENNOSIDES 1 TABLET: 50; 8.6 TABLET, COATED ORAL at 23:24

## 2018-08-21 RX ADMIN — TAMSULOSIN HYDROCHLORIDE 0.4 MG: 0.4 CAPSULE ORAL at 19:15

## 2018-08-21 RX ADMIN — Medication 1000 MG: at 13:15

## 2018-08-21 RX ADMIN — OXYCODONE HYDROCHLORIDE 10 MG: 10 TABLET ORAL at 19:15

## 2018-08-21 RX ADMIN — FENTANYL CITRATE 50 MCG: 50 INJECTION, SOLUTION INTRAMUSCULAR; INTRAVENOUS at 17:45

## 2018-08-21 RX ADMIN — SODIUM CHLORIDE, SODIUM LACTATE, POTASSIUM CHLORIDE, CALCIUM CHLORIDE AND DEXTROSE MONOHYDRATE: 5; 600; 310; 30; 20 INJECTION, SOLUTION INTRAVENOUS at 21:12

## 2018-08-21 RX ADMIN — ACETAMINOPHEN 1000 MG: 500 TABLET, FILM COATED ORAL at 19:15

## 2018-08-21 RX ADMIN — OXYCODONE HYDROCHLORIDE 5 MG: 5 SOLUTION ORAL at 17:45

## 2018-08-21 RX ADMIN — SODIUM CHLORIDE, SODIUM LACTATE, POTASSIUM CHLORIDE, CALCIUM CHLORIDE: 600; 310; 30; 20 INJECTION, SOLUTION INTRAVENOUS at 13:40

## 2018-08-21 RX ADMIN — CEFAZOLIN SODIUM 2 G: 2 INJECTION, SOLUTION INTRAVENOUS at 22:00

## 2018-08-21 RX ADMIN — VANCOMYCIN HYDROCHLORIDE 1300 MG: 100 INJECTION, POWDER, LYOPHILIZED, FOR SOLUTION INTRAVENOUS at 22:00

## 2018-08-21 RX ADMIN — KETOROLAC TROMETHAMINE 30 MG: 30 INJECTION, SOLUTION INTRAMUSCULAR at 19:15

## 2018-08-21 RX ADMIN — CELECOXIB 200 MG: 200 CAPSULE ORAL at 13:15

## 2018-08-21 RX ADMIN — OXYCODONE HYDROCHLORIDE 10 MG: 10 TABLET ORAL at 23:24

## 2018-08-21 RX ADMIN — GABAPENTIN 300 MG: 300 CAPSULE ORAL at 13:15

## 2018-08-21 RX ADMIN — DOCUSATE SODIUM 100 MG: 100 CAPSULE, LIQUID FILLED ORAL at 19:15

## 2018-08-21 RX ADMIN — TRANEXAMIC ACID 1000 MG: 100 INJECTION, SOLUTION INTRAVENOUS at 17:00

## 2018-08-21 ASSESSMENT — COPD QUESTIONNAIRES
DURING THE PAST 4 WEEKS HOW MUCH DID YOU FEEL SHORT OF BREATH: SOME OF THE TIME
DO YOU EVER COUGH UP ANY MUCUS OR PHLEGM?: YES, A FEW DAYS A WEEK OR MONTH
HAVE YOU SMOKED AT LEAST 100 CIGARETTES IN YOUR ENTIRE LIFE: YES
COPD SCREENING SCORE: 6

## 2018-08-21 ASSESSMENT — PAIN SCALES - GENERAL
PAINLEVEL_OUTOF10: 0
PAINLEVEL_OUTOF10: 4
PAINLEVEL_OUTOF10: 7
PAINLEVEL_OUTOF10: 8
PAINLEVEL_OUTOF10: 0
PAINLEVEL_OUTOF10: 7
PAINLEVEL_OUTOF10: 5
PAINLEVEL_OUTOF10: 4
PAINLEVEL_OUTOF10: 0
PAINLEVEL_OUTOF10: 0
PAINLEVEL_OUTOF10: 4

## 2018-08-21 ASSESSMENT — LIFESTYLE VARIABLES: EVER_SMOKED: YES

## 2018-08-21 NOTE — PROGRESS NOTES
The Medication Reconciliation process has been completed by interviewing the patient    Allergies have been reviewed  Antibiotic use in 30 days - none    Home Pharmacy:  [facility] - [address]

## 2018-08-21 NOTE — OP REPORT
Preop Diagnosis: Infected left revision TKA  Postop Diagnosis:  Same   Procedure: Resection of infected left total knee arthroplasty  Surgeon: Dr. Pramod Boston MD  Assistant: Luisito Park PA-C, Yordan Miller MD  Anesthesia: Dr. Jaimes.  General + Adductor canal  Estimated Blood Loss: 50cc  Drains: None  Complications: None    Implants remove: Bryon PS with a revision stem  Implants placed: Smith and Nephew size 6 femur and size 5x18 constrained poly     Indications: He is a pleasant 58-year-old male who had a primary total knee arthroplasty and then subsequent femoral revision for unclear reasons in [place].  He presented to me with a increasingly painful and increasingly stiff knee.  Workup revealed increased inflammatory markers.  Aspiration was simply read as numerous white blood cells, nearly all neutrophils, without any specific numbers.  His range of motion was very limited and he had a significant limp and significant pain.  His implants were not obviously loose on imaging.  Workup was very concerning for infection, and so we discussed a two-stage revision approach.  We discussed the risks of persistent or recurrent infection, bleeding, transfusion, pain, neurovascular injury, stiffness, fracture, wound complication, loosening of the parts over time, blood clots, and medical complication.  Discussed that this was a stage procedure which would necessitate further surgery.  He elected to proceed.    Pertinent Findings and Releases: There was purulence and chronic synovitis present.  His preoperative range of motion was ° on the table with some varus valgus instability throughout range of motion.  Implants were all well fixed, there was some bone beginning to grow up around the edges of the implants, particularly the tibia and patella.  On the tibia he has had a central defect from the heel.  On the femur also just a central defect from the previous stem.  Condyles were supportive in good  condition.    He was identified in the preoperative holding area and informed consent and site marking were confirmed.  An adductor canal block had been performed by the anesthesiologist.  He was then brought back to the OR where anesthesia was performed.  He was positioned supine with all bony prominences well padded with a padded tourniquet on the thigh.  The extremity was prepped and draped in the usual sterile fashion. I performed a pre-procedure timeout to confirm we had the correct patient, side, site, procedure, and presence of necessary personal and equipment.  The tourniquet was then inflated.  Ancef and Vancomycin were given after cultures obtained.  TXA was given.    His previous anterior incision was excised and a medial parapatellar arthrotomy performed.  There was abundant synovitis and purulent fluid present.  A thorough synovectomy was performed.  I freed up around the femoral and tibial components.  The old poly-was removed.  The femur was loosened up with a saw and then disimpacted without any bone loss.  The tibia was likewise loosen up with us on an disimpacted with only a central defect from the heel.  Using a rongeur and osteotomes and a bur cement was meticulously removed from the ends of the femur and the tibia closely inspected to make sure there is no residual foreign body.  The canals were reamed to 14 mm.  The patella was then exposed and the patellar button was removed.  There is been some bone that had overgrown the edges of the button, but it was not obviously loose.  There should be sufficient bone stock for later patellar resurfacing.  The knee was then copiously irrigated and reinspected to make sure he did not miss any cement.  Soaks were performed using Betadine and hydrogen peroxide.  A spacer was assembled on the back table consisting of a size 6 cobalt chrome femur and a size 5 x 18 mm constrained poly insert.  A 6 threaded Steinmann pin had been inserted into the undersurface  of the poly-insert such to create a stem.  One batch of antibiotic containing cement was mixed to create dowels for the tibia and femur.  Posterior augments were placed on the femoral component using the cement.  Once that cemented hardened, the tourniquet was let down and hemostasis was ensured.  Those implants were then loosely cemented into place using 2 additional batches of antibiotic containing cement.  The knee was brought into full extension and allowed to harden in that manner.  The arthrotomy was closed with running Quill suture.  The remainder of the wound was closed in layers with Monocryl and Dermabond and occlusive silver dressing in the skin.  A compressive Ace wrap and knee immobilizer replaced and history of anesthesia in stable condition without any apparent intraoperative complications.    Though this is billed as a removal of implants and spacer insertion, it did require an experienced assistant to help position the leg in space, prevent ligament or bony damage, and to allow for proper balancing and insertion of knee replacement components.      Plan: Toe-touch weightbearing in a knee immobilizer.  PT/OT evaluation.  SNF evaluation.  I will keep him on Ancef and vancomycin pending and infectious disease consult and cultures.  Lovenox for DVT prophylaxis.  Followup in 2 weeks in clinic.

## 2018-08-21 NOTE — OR NURSING
Pt to PACU s/p resection of infected left total knee arthroplasty. Sedate with LMA in place upon arrival, d/c'd without issue, maintaining sats on RA. VSS throughout stay, NSR on monitor, Bps WNL. Surgical incision WNL, dressing CDI, immobilizer in place. CMS intact. Tolerating PO liquids with no c/o nausea. Medicated with IV and PO analgesics for moderate c/o knee pain. Updated pt to POC, emotional support provided. Stable for transfer to ortho. Report to BENY Ramirez.

## 2018-08-22 LAB
ANION GAP SERPL CALC-SCNC: 6 MMOL/L (ref 0–11.9)
BUN SERPL-MCNC: 11 MG/DL (ref 8–22)
CALCIUM SERPL-MCNC: 8.4 MG/DL (ref 8.5–10.5)
CHLORIDE SERPL-SCNC: 105 MMOL/L (ref 96–112)
CO2 SERPL-SCNC: 28 MMOL/L (ref 20–33)
CREAT SERPL-MCNC: 0.82 MG/DL (ref 0.5–1.4)
CRP SERPL HS-MCNC: 0.83 MG/DL (ref 0–0.75)
ERYTHROCYTE [SEDIMENTATION RATE] IN BLOOD BY WESTERGREN METHOD: 12 MM/HOUR (ref 0–20)
GLUCOSE SERPL-MCNC: 149 MG/DL (ref 65–99)
GRAM STN SPEC: NORMAL
HCT VFR BLD AUTO: 35.8 % (ref 42–52)
HGB BLD-MCNC: 11.8 G/DL (ref 14–18)
POTASSIUM SERPL-SCNC: 4 MMOL/L (ref 3.6–5.5)
SIGNIFICANT IND 70042: NORMAL
SITE SITE: NORMAL
SODIUM SERPL-SCNC: 139 MMOL/L (ref 135–145)
SOURCE SOURCE: NORMAL

## 2018-08-22 PROCEDURE — G8979 MOBILITY GOAL STATUS: HCPCS | Mod: CI

## 2018-08-22 PROCEDURE — 700102 HCHG RX REV CODE 250 W/ 637 OVERRIDE(OP): Performed by: STUDENT IN AN ORGANIZED HEALTH CARE EDUCATION/TRAINING PROGRAM

## 2018-08-22 PROCEDURE — A9270 NON-COVERED ITEM OR SERVICE: HCPCS | Performed by: STUDENT IN AN ORGANIZED HEALTH CARE EDUCATION/TRAINING PROGRAM

## 2018-08-22 PROCEDURE — 80048 BASIC METABOLIC PNL TOTAL CA: CPT

## 2018-08-22 PROCEDURE — 86140 C-REACTIVE PROTEIN: CPT

## 2018-08-22 PROCEDURE — 97165 OT EVAL LOW COMPLEX 30 MIN: CPT

## 2018-08-22 PROCEDURE — 700105 HCHG RX REV CODE 258: Performed by: STUDENT IN AN ORGANIZED HEALTH CARE EDUCATION/TRAINING PROGRAM

## 2018-08-22 PROCEDURE — G8988 SELF CARE GOAL STATUS: HCPCS | Mod: CI

## 2018-08-22 PROCEDURE — 36415 COLL VENOUS BLD VENIPUNCTURE: CPT

## 2018-08-22 PROCEDURE — G8978 MOBILITY CURRENT STATUS: HCPCS | Mod: CK

## 2018-08-22 PROCEDURE — 770001 HCHG ROOM/CARE - MED/SURG/GYN PRIV*

## 2018-08-22 PROCEDURE — 85018 HEMOGLOBIN: CPT

## 2018-08-22 PROCEDURE — 97162 PT EVAL MOD COMPLEX 30 MIN: CPT

## 2018-08-22 PROCEDURE — 85014 HEMATOCRIT: CPT

## 2018-08-22 PROCEDURE — 85652 RBC SED RATE AUTOMATED: CPT

## 2018-08-22 PROCEDURE — 700111 HCHG RX REV CODE 636 W/ 250 OVERRIDE (IP): Performed by: STUDENT IN AN ORGANIZED HEALTH CARE EDUCATION/TRAINING PROGRAM

## 2018-08-22 PROCEDURE — 700112 HCHG RX REV CODE 229: Performed by: STUDENT IN AN ORGANIZED HEALTH CARE EDUCATION/TRAINING PROGRAM

## 2018-08-22 PROCEDURE — G8987 SELF CARE CURRENT STATUS: HCPCS | Mod: CJ

## 2018-08-22 RX ADMIN — DOCUSATE SODIUM 100 MG: 100 CAPSULE, LIQUID FILLED ORAL at 06:31

## 2018-08-22 RX ADMIN — ACETAMINOPHEN 1000 MG: 500 TABLET, FILM COATED ORAL at 23:55

## 2018-08-22 RX ADMIN — CELECOXIB 200 MG: 200 CAPSULE ORAL at 17:05

## 2018-08-22 RX ADMIN — ACETAMINOPHEN 1000 MG: 500 TABLET, FILM COATED ORAL at 17:04

## 2018-08-22 RX ADMIN — ASPIRIN 81 MG: 81 TABLET, DELAYED RELEASE ORAL at 17:04

## 2018-08-22 RX ADMIN — SODIUM CHLORIDE, SODIUM LACTATE, POTASSIUM CHLORIDE, CALCIUM CHLORIDE AND DEXTROSE MONOHYDRATE: 5; 600; 310; 30; 20 INJECTION, SOLUTION INTRAVENOUS at 23:15

## 2018-08-22 RX ADMIN — CEFAZOLIN SODIUM 2 G: 2 INJECTION, SOLUTION INTRAVENOUS at 06:31

## 2018-08-22 RX ADMIN — ASPIRIN 81 MG: 81 TABLET, DELAYED RELEASE ORAL at 06:31

## 2018-08-22 RX ADMIN — OXYCODONE HYDROCHLORIDE 10 MG: 10 TABLET ORAL at 17:08

## 2018-08-22 RX ADMIN — DOCUSATE SODIUM -SENNOSIDES 1 TABLET: 50; 8.6 TABLET, COATED ORAL at 20:29

## 2018-08-22 RX ADMIN — KETOROLAC TROMETHAMINE 30 MG: 30 INJECTION, SOLUTION INTRAMUSCULAR at 12:31

## 2018-08-22 RX ADMIN — CEFAZOLIN SODIUM 2 G: 2 INJECTION, SOLUTION INTRAVENOUS at 20:49

## 2018-08-22 RX ADMIN — KETOROLAC TROMETHAMINE 30 MG: 30 INJECTION, SOLUTION INTRAMUSCULAR at 06:32

## 2018-08-22 RX ADMIN — ACETAMINOPHEN 1000 MG: 500 TABLET, FILM COATED ORAL at 12:31

## 2018-08-22 RX ADMIN — VANCOMYCIN HYDROCHLORIDE 1300 MG: 100 INJECTION, POWDER, LYOPHILIZED, FOR SOLUTION INTRAVENOUS at 23:15

## 2018-08-22 RX ADMIN — ACETAMINOPHEN 1000 MG: 500 TABLET, FILM COATED ORAL at 06:31

## 2018-08-22 RX ADMIN — VANCOMYCIN HYDROCHLORIDE 1300 MG: 100 INJECTION, POWDER, LYOPHILIZED, FOR SOLUTION INTRAVENOUS at 09:24

## 2018-08-22 RX ADMIN — CEFAZOLIN SODIUM 2 G: 2 INJECTION, SOLUTION INTRAVENOUS at 13:00

## 2018-08-22 RX ADMIN — DOCUSATE SODIUM 100 MG: 100 CAPSULE, LIQUID FILLED ORAL at 17:05

## 2018-08-22 ASSESSMENT — COGNITIVE AND FUNCTIONAL STATUS - GENERAL
SUGGESTED CMS G CODE MODIFIER DAILY ACTIVITY: CJ
MOVING FROM LYING ON BACK TO SITTING ON SIDE OF FLAT BED: UNABLE
STANDING UP FROM CHAIR USING ARMS: A LITTLE
MOBILITY SCORE: 18
TOILETING: A LITTLE
DAILY ACTIVITIY SCORE: 22
SUGGESTED CMS G CODE MODIFIER MOBILITY: CK
DRESSING REGULAR LOWER BODY CLOTHING: A LITTLE
SUGGESTED CMS G CODE MODIFIER DAILY ACTIVITY: CJ
MOVING FROM LYING ON BACK TO SITTING ON SIDE OF FLAT BED: A LITTLE
STANDING UP FROM CHAIR USING ARMS: A LITTLE
DRESSING REGULAR LOWER BODY CLOTHING: A LITTLE
WALKING IN HOSPITAL ROOM: A LITTLE
DAILY ACTIVITIY SCORE: 21
CLIMB 3 TO 5 STEPS WITH RAILING: A LITTLE
MOBILITY SCORE: 20
SUGGESTED CMS G CODE MODIFIER MOBILITY: CJ
DRESSING REGULAR UPPER BODY CLOTHING: A LITTLE
CLIMB 3 TO 5 STEPS WITH RAILING: A LITTLE
HELP NEEDED FOR BATHING: A LITTLE
WALKING IN HOSPITAL ROOM: A LITTLE

## 2018-08-22 ASSESSMENT — GAIT ASSESSMENTS
ASSISTIVE DEVICE: FRONT WHEEL WALKER
DEVIATION: BRADYKINETIC;STEP TO
GAIT LEVEL OF ASSIST: STAND BY ASSIST
DISTANCE (FEET): 50

## 2018-08-22 ASSESSMENT — PAIN SCALES - GENERAL
PAINLEVEL_OUTOF10: 7
PAINLEVEL_OUTOF10: 5
PAINLEVEL_OUTOF10: 8
PAINLEVEL_OUTOF10: 5
PAINLEVEL_OUTOF10: 7
PAINLEVEL_OUTOF10: 6
PAINLEVEL_OUTOF10: 3
PAINLEVEL_OUTOF10: 3

## 2018-08-22 ASSESSMENT — ACTIVITIES OF DAILY LIVING (ADL): TOILETING: INDEPENDENT

## 2018-08-22 ASSESSMENT — PATIENT HEALTH QUESTIONNAIRE - PHQ9
2. FEELING DOWN, DEPRESSED, IRRITABLE, OR HOPELESS: NOT AT ALL
1. LITTLE INTEREST OR PLEASURE IN DOING THINGS: NOT AT ALL
SUM OF ALL RESPONSES TO PHQ9 QUESTIONS 1 AND 2: 0

## 2018-08-22 ASSESSMENT — LIFESTYLE VARIABLES: EVER_SMOKED: YES

## 2018-08-22 NOTE — THERAPY
"Physical Therapy Evaluation completed.   Bed Mobility:  Supine to Sit: Supervised  Transfers: Sit to Stand: Stand by Assist  Gait: Level Of Assist: Stand by Assist with Front-Wheel Walker       Plan of Care: Will benefit from Physical Therapy 2 times per week  Discharge Recommendations: Equipment: Front-Wheel Walker.    Pt is on TTWB precautions s/p LTKA with immobilizer in extension. He exhibits antalgic gait with step to pattern utilizing a FWW and SBA. He requires 50% VC to maintain TTWB precautions and to decrease reach of FWW to maintian safe BETTIE. Improved with VC and patient education. Supine exercises taught to maintain strength of hip and knee musculature. Will continue to follow to facilitate knee extension and maintenance of WB precautions; from a PT perspective, pt appears functionally capable of d/c to whatever location is deemed medically necessary for antibiotics.    See \"Rehab Therapy-Acute\" Patient Summary Report for complete documentation.     "

## 2018-08-22 NOTE — PROGRESS NOTES
Patient admitted to the floor via gurney. Awake and alert. Left knee with dressing intact, patient oriented to room and unit routine.

## 2018-08-22 NOTE — CARE PLAN
Problem: Infection  Goal: Will remain free from infection    Intervention: Assess signs and symptoms of infection  Assess for color changes at wound site , temperature difference , increased pain , increased drainage. Monitor labs for any indication of trending infections.ALL WDL       Problem: Bowel/Gastric:  Goal: Normal bowel function is maintained or improved    Intervention: Educate patient and significant other/support system about diet, fluid intake, medications and activity to promote bowel function  Educated patient and encouraged adequate water intake to assist in ensuring bowel movement.

## 2018-08-22 NOTE — PROGRESS NOTES
RN skin check completed with Mei      All areas over bony prominence intact and blanching      No skin tears present   All mucous membranes are intact      No fungal agitations in groin, pannis or breast tissue folds.     No apparent issues present upon admission     Some past scarring on anterior bi lateral shins    Surgical incision VIRGILIO on left leg beneath ACE wrap

## 2018-08-22 NOTE — PROGRESS NOTES
Assumed Care at 1900  VSS  No complaints of pain at this time   Call light within reach.  Patient resting comfortably    Immobilizer in place  Polar Ice in place     Patient is refusing to mobilize per numbness and discomfort   Once numbness wore off patient declined ambulation . Agreed to try again after he rests for awhile .

## 2018-08-22 NOTE — CARE PLAN
Problem: Safety  Goal: Will remain free from injury  Call light within reach. Pt calls for assistance at all times.  Bed in lowest position, upper bedrails up, personal possessions within reach, treaded socks on.  Hourly rounding in place.          Problem: Pain Management  Goal: Pain level will decrease to patient's comfort goal  Pt denies need for pain medication. Educated on importance on keeping pain controlled for healing. Continue to assess throughout shift.

## 2018-08-22 NOTE — PROGRESS NOTES
" Subjective:      No overnight events.  Pain reasonably controlled.  No fevers, chills, SOB, or CP.  Cultures pending.    Objective:    Blood pressure 129/73, pulse 81, temperature 37.4 °C (99.3 °F), resp. rate 17, height 1.829 m (6' 0.01\"), weight 74.8 kg (165 lb), SpO2 99 %.    Recent Labs      08/22/18   0618   HEMOGLOBIN  11.8*   HEMATOCRIT  35.8*             Intake/Output Summary (Last 24 hours) at 08/22/18 0711  Last data filed at 08/22/18 0010   Gross per 24 hour   Intake             2260 ml   Output              225 ml   Net             2035 ml       Comfortable, no distress  Neurologically and vascularly intact with palpable pedal pulses bilaterally.  Dressing C/D/I      Assessment:    Doing well s/p resection total knee arthroplasty.  Cultures pending    Plan:      TTWB in immobilizer  OT/PT  DVT ppx - Lovenox + Sequential Compression Devices  Will consult ID.  Currently Ancef + Vanco  EDGAR planning - anticipate SNF      "

## 2018-08-22 NOTE — PROGRESS NOTES
"Pharmacy Kinetics 58 y.o. male on vancomycin day # 1 2018    Currently on Vancomycin 1300 mg iv q12hr    Indication for Treatment: SSTI    Pertinent history per medical record: Admitted on 2018 for left knee pain.  Patient underwent total knee revision on .  Vancomycin ordered to continue post-op.  Infectious disease consult anticipated.      Other antibiotics: Cefazolin 2g q8h    Allergies: Patient has no known allergies.     List concerns for renal function: None    Pertinent cultures to date:     Tissue culture -- in process    No results for input(s): WBC, NEUTSPOLYS, BANDSSTABS in the last 72 hours.  No results for input(s): BUN, CREATININE, ALBUMIN in the last 72 hours.  No results for input(s): VANCOTROUGH, VANCOPEAK, VANCORANDOM in the last 72 hours.  Intake/Output Summary (Last 24 hours) at 18 0116  Last data filed at 18 0010   Gross per 24 hour   Intake             2260 ml   Output              225 ml   Net             2035 ml      Blood pressure 131/70, pulse 81, temperature 36.7 °C (98.1 °F), resp. rate 16, height 1.829 m (6' 0.01\"), weight 74.8 kg (165 lb), SpO2 96 %. Temp (24hrs), Av.7 °C (98 °F), Min:36.3 °C (97.3 °F), Max:36.8 °C (98.3 °F)      A/P   1. Vancomycin dose change: New start  2. Next vancomycin level: Prior to 4th dose (not ordered)  3. Goal trough: 12-16 mcg/mL  4. Comments: Patient has little risk for accumulation, renal function is stable.  They received vancomycin 1000 mg in the OR at 1500 on .  Will start a maintenance dose, per protocol, early due to patient not receiving full 25 mg/kg loading dose.  Pharmacy will continue to follow.      Felix Henley, PharmD      "

## 2018-08-22 NOTE — PROGRESS NOTES
"Pharmacy Kinetics 58 y.o. male on vancomycin day # 2 2018    Currently on Vancomycin 1300 mg iv q12hr    Indication for Treatment: SSTI    Pertinent history per medical record: Admitted on 2018 for infected left TKA .  He is s/p resection of his arthroplasty on .  Per Dr. Boston, ID consult is pending.    Other antibiotics: cefazolin 2 gm IV Q8H for 3 days    Allergies: Patient has no known allergies.     List concerns for renal function: none    Pertinent cultures to date:   18 tissue, left knee: NGTD  18 tissue, left knee #2: NGTD  18 tissue, left knee #3: NGTD    No results for input(s): WBC, NEUTSPOLYS, BANDSSTABS in the last 72 hours.  Recent Labs      18   0932   BUN  11   CREATININE  0.82     No results for input(s): VANCOTROUGH, VANCOPEAK, VANCORANDOM in the last 72 hours.  Intake/Output Summary (Last 24 hours) at 18 1343  Last data filed at 18 0010   Gross per 24 hour   Intake             2260 ml   Output              225 ml   Net             2035 ml      Blood pressure 123/70, pulse 87, temperature 36.7 °C (98 °F), resp. rate 16, height 1.829 m (6' 0.01\"), weight 74.8 kg (165 lb), SpO2 99 %. Temp (24hrs), Av.7 °C (98.1 °F), Min:36.3 °C (97.3 °F), Max:37.4 °C (99.3 °F)      A/P   1. Vancomycin dose change: not indicated   2. Next vancomycin level: 0930 tomorrow  3. Goal trough: 12-16 mcg/mL  4. Comments: Renal function is stable. Vancomycin level ordered prior to the 1000 dose tomorrow.    Ghada Weber, PharmD.      "

## 2018-08-22 NOTE — RESPIRATORY CARE
COPD EDUCATION by COPD CLINICAL EDUCATOR  8/22/2018 at 4:17 PM by Astrid Garner     Patient reviewed by COPD education team. Patient does not qualify for COPD program.

## 2018-08-22 NOTE — THERAPY
"Occupational Therapy Evaluation completed.   Functional Status:  CGA LB dressing, SBA functional mobility w/ FWW , SPV grooming standing - brushed teeth  Plan of Care: Will benefit from Occupational Therapy 1 times per week  Discharge Recommendations:  Equipment: Will Continue to Assess for Equipment Needs. Post-acute therapy: From OT perspective pt appears to be functionally capable to perform BADLs at home.    See \"Rehab Therapy-Acute\" Patient Summary Report for complete documentation.    Pt is a 59 y/o male who presents to acute s/p L TKA, he is TTWB and has immobilizer to be worn at all times. Pt reports he will not be going home after acute stay and that he usually lives alone. Ed/trained pt in compensatory techniques to perform BADLs. Pt impaired by post op pain limiting independence w/ BADLs. Will follow for Acute OT services.     "

## 2018-08-23 LAB
HCV AB SER QL: NEGATIVE
HIV 1+2 AB+HIV1 P24 AG SERPL QL IA: NON REACTIVE
VANCOMYCIN TROUGH SERPL-MCNC: 13.5 UG/ML (ref 10–20)

## 2018-08-23 PROCEDURE — 700112 HCHG RX REV CODE 229: Performed by: STUDENT IN AN ORGANIZED HEALTH CARE EDUCATION/TRAINING PROGRAM

## 2018-08-23 PROCEDURE — A9270 NON-COVERED ITEM OR SERVICE: HCPCS | Performed by: STUDENT IN AN ORGANIZED HEALTH CARE EDUCATION/TRAINING PROGRAM

## 2018-08-23 PROCEDURE — 86803 HEPATITIS C AB TEST: CPT

## 2018-08-23 PROCEDURE — 36415 COLL VENOUS BLD VENIPUNCTURE: CPT

## 2018-08-23 PROCEDURE — 700111 HCHG RX REV CODE 636 W/ 250 OVERRIDE (IP): Performed by: INTERNAL MEDICINE

## 2018-08-23 PROCEDURE — 700111 HCHG RX REV CODE 636 W/ 250 OVERRIDE (IP): Performed by: STUDENT IN AN ORGANIZED HEALTH CARE EDUCATION/TRAINING PROGRAM

## 2018-08-23 PROCEDURE — 700105 HCHG RX REV CODE 258: Performed by: INTERNAL MEDICINE

## 2018-08-23 PROCEDURE — 770001 HCHG ROOM/CARE - MED/SURG/GYN PRIV*

## 2018-08-23 PROCEDURE — 80202 ASSAY OF VANCOMYCIN: CPT

## 2018-08-23 PROCEDURE — 700102 HCHG RX REV CODE 250 W/ 637 OVERRIDE(OP): Performed by: STUDENT IN AN ORGANIZED HEALTH CARE EDUCATION/TRAINING PROGRAM

## 2018-08-23 PROCEDURE — 87389 HIV-1 AG W/HIV-1&-2 AB AG IA: CPT

## 2018-08-23 PROCEDURE — 700105 HCHG RX REV CODE 258: Performed by: STUDENT IN AN ORGANIZED HEALTH CARE EDUCATION/TRAINING PROGRAM

## 2018-08-23 PROCEDURE — 99255 IP/OBS CONSLTJ NEW/EST HI 80: CPT | Performed by: INTERNAL MEDICINE

## 2018-08-23 RX ORDER — TRAMADOL HYDROCHLORIDE 50 MG/1
50-100 TABLET ORAL EVERY 6 HOURS PRN
Qty: 80 TAB | Refills: 0 | Status: SHIPPED | OUTPATIENT
Start: 2018-08-23 | End: 2018-09-02

## 2018-08-23 RX ORDER — ASPIRIN 81 MG/1
81 TABLET, CHEWABLE ORAL 2 TIMES DAILY
Qty: 56 TAB | Refills: 0 | Status: SHIPPED | OUTPATIENT
Start: 2018-08-23 | End: 2018-09-20

## 2018-08-23 RX ORDER — MELOXICAM 7.5 MG/1
7.5 TABLET ORAL DAILY
Qty: 30 TAB | Refills: 0 | Status: SHIPPED | OUTPATIENT
Start: 2018-08-23 | End: 2018-09-22

## 2018-08-23 RX ORDER — DOCUSATE SODIUM 100 MG/1
100 CAPSULE, LIQUID FILLED ORAL 2 TIMES DAILY
Qty: 60 CAP | Refills: 0 | Status: SHIPPED | OUTPATIENT
Start: 2018-08-23 | End: 2018-11-02

## 2018-08-23 RX ORDER — ACETAMINOPHEN 500 MG
500-1000 TABLET ORAL EVERY 6 HOURS
Qty: 100 TAB | Refills: 0 | Status: SHIPPED | OUTPATIENT
Start: 2018-08-23 | End: 2018-09-20

## 2018-08-23 RX ORDER — OXYCODONE HYDROCHLORIDE 5 MG/1
5-10 TABLET ORAL EVERY 4 HOURS PRN
Qty: 60 TAB | Refills: 0 | Status: SHIPPED | OUTPATIENT
Start: 2018-08-23 | End: 2018-08-28

## 2018-08-23 RX ADMIN — VANCOMYCIN HYDROCHLORIDE 1300 MG: 100 INJECTION, POWDER, LYOPHILIZED, FOR SOLUTION INTRAVENOUS at 20:44

## 2018-08-23 RX ADMIN — CELECOXIB 200 MG: 200 CAPSULE ORAL at 08:20

## 2018-08-23 RX ADMIN — DOCUSATE SODIUM 100 MG: 100 CAPSULE, LIQUID FILLED ORAL at 18:03

## 2018-08-23 RX ADMIN — DOCUSATE SODIUM 100 MG: 100 CAPSULE, LIQUID FILLED ORAL at 05:01

## 2018-08-23 RX ADMIN — CEFTRIAXONE SODIUM 2 G: 2 INJECTION, POWDER, FOR SOLUTION INTRAMUSCULAR; INTRAVENOUS at 14:31

## 2018-08-23 RX ADMIN — OXYCODONE HYDROCHLORIDE 10 MG: 10 TABLET ORAL at 08:20

## 2018-08-23 RX ADMIN — ASPIRIN 81 MG: 81 TABLET, DELAYED RELEASE ORAL at 16:57

## 2018-08-23 RX ADMIN — ACETAMINOPHEN 1000 MG: 500 TABLET, FILM COATED ORAL at 23:42

## 2018-08-23 RX ADMIN — CEFAZOLIN SODIUM 2 G: 2 INJECTION, SOLUTION INTRAVENOUS at 05:01

## 2018-08-23 RX ADMIN — TAMSULOSIN HYDROCHLORIDE 0.4 MG: 0.4 CAPSULE ORAL at 08:20

## 2018-08-23 RX ADMIN — VANCOMYCIN HYDROCHLORIDE 1300 MG: 100 INJECTION, POWDER, LYOPHILIZED, FOR SOLUTION INTRAVENOUS at 10:59

## 2018-08-23 RX ADMIN — DOCUSATE SODIUM -SENNOSIDES 1 TABLET: 50; 8.6 TABLET, COATED ORAL at 20:06

## 2018-08-23 RX ADMIN — ASPIRIN 81 MG: 81 TABLET, DELAYED RELEASE ORAL at 05:01

## 2018-08-23 ASSESSMENT — PAIN SCALES - GENERAL
PAINLEVEL_OUTOF10: 5
PAINLEVEL_OUTOF10: 5
PAINLEVEL_OUTOF10: 7
PAINLEVEL_OUTOF10: 10
PAINLEVEL_OUTOF10: 5

## 2018-08-23 NOTE — DISCHARGE PLANNING
Patient seen at bedside.  Tired, minimally vocal.  Agrees to interview.  Verbally agrees for blanket referral to be sent to area SNFs per initial Choice Form filled out at PreAdmit.  CM/SW team updated.  No questions or concerns at this time.    Potential barriers to discharge (insurance).  Will continue to follow for needs.     Maria Isabel Mcneal, Total Joint Coordinator  [phone]  Cell:  [phone]

## 2018-08-23 NOTE — CONSULTS
DATE OF SERVICE:  08/23/2018    INFECTIOUS DISEASE CONSULTATION    REASON FOR CONSULT:  Infected total left knee arthroplasty.    CONSULTING PHYSICIAN:  Dr. Boston.    HISTORY OF PRESENT ILLNESS:  Please note majority of the history is obtained   from the chart as the patient is a somewhat vague historian, but he is a   pleasant 58-year-old -American male who was originally admitted to the   hospital on 08/21/2018 for resection of an infected left total knee   arthroplasty.  Apparently, he had his total knee replacement with femoral   revision that was done in [place].  Records are not available.  The patient   is unclear about sequence of events.  He was seen by orthopedic surgery due   to increasing pain, swelling and decreased range of motion of the left knee.    He was found to have significantly increased inflammatory markers.  Aspiration   was done with a significant increase in his white blood cells, primarily   neutrophils.  There was no mechanical loosening.  He was admitted for the   initial portion of a 2-stage revision.  During the explantation of his   arthroplasty, there was some purulence and chronic synovitis noted.  Cultures   were taken.  He is currently receiving vancomycin and Ancef.  Infectious   disease is consulted for antibiotic recommendations and management.    Antibiotic spacer is in place.  Current cultures are negative.    REVIEW OF SYSTEMS:  All other systems are reviewed and are negative except for   the pain in the left knee.  The patient does not recall ever having fever,   chills or systemic illnesses.    PAST MEDICAL HISTORY:  He has BPH and asthma.    FAMILY HISTORY:  Denied.    SOCIAL HISTORY:  He is a smoker.  He does have a history of methamphetamine   use, unclear when he quit.  Review of medical records also show ER visit for   peritonsillar abscess and situational anxiety.    PHYSICAL EXAMINATION:  GENERAL:  He is a thin male, looks older than his stated age.  VITAL  SIGNS:  He has been afebrile, temperature is 97.1, blood pressure   131/75, pulse 65, respiratory rate 17, oxygen saturation 97% on room air.  He   weighs about 75 kilos.  HEENT:  Head is normocephalic, atraumatic.  Pupils are equal, round, and   reactive to light.  Extraocular movements intact.  Oropharynx is clear.  He   has poor dentition.  NECK:  Supple.  CARDIOVASCULAR:  Regular rate and rhythm.  CHEST:  Grossly clear to auscultation bilaterally, unlabored.  ABDOMEN:  Soft, nontender.  He does have a leg brace in place.  NEUROLOGIC:  He is awake, alert, very poor historian, does follow commands.    LABORATORY DATA:  White blood cell count 5.5, H and H of 13 and 40, platelets   of 345.  Sodium 139, potassium 4, chloride 105, bicarbonate 28, glucose 149,   BUN 11, creatinine 0.82.  He had a glycosylated hemoglobin that was 6.3.    Troponin was negative.  BNP was negative.  So far cultures are negative at 48   hours.    ASSESSMENT AND PLAN:  A 58-year-old male admitted for explantation of infected   total knee arthroplasty.  Cultures so far are negative.  I suspect his prior   revision was due to infection; however, it is unclear, maybe helpful to try   and obtain records from [place] if possible (if the patient can  remember where he was treated.)  He is currently afebrile without leukocytosis.    Cultures were negative.  However, evidence of chronic infection was seen   during surgery with purulence and chronic synovitis.  We will treat for   indolent organisms.  Continue vancomycin.  Can change Ancef to ceftriaxone.    Because of his history of methamphetamine use and concern for underlying dementia versus  organic brain syndrome, he is not a candidate for outpatient infusion or treatment.  He   will require placement.  Can place PICC line if required by skilled nursing   facility.  Because of his history of meth use, we will check HIV and hep C.    Further recommendations per culture results and clinical  course.    Thank you and we will follow with you.       ____________________________________     MD BETH WESTFALL / MISBAH    DD:  08/23/2018 14:19:09  DT:  08/23/2018 15:04:30    D#:  [id number]  Job#:  [id number]

## 2018-08-23 NOTE — PROGRESS NOTES
Awake, A&O, VSS, BUI, =.  LLE in immobilizer.  Dressing is CDI.  Pain level a 10/10, medicated for pain per mar.  Denies having N/T.  PIV patent, SL.  Using urinal at BS to void.  Ambulates with SBA and FWW, gait is steady.  POC discussed, pt agrees and verbalizes understanding.  Hourly rounding in place, call light is within reach.  Assessment completed as charted.

## 2018-08-23 NOTE — CARE PLAN
Problem: Venous Thromboembolism (VTW)/Deep Vein Thrombosis (DVT) Prevention:  Goal: Patient will participate in Venous Thrombosis (VTE)/Deep Vein Thrombosis (DVT)Prevention Measures    Intervention: Ensure patient wears graduated elastic stockings (WILLY hose) and/or SCDs, if ordered, when in bed or chair (Remove at least once per shift for skin check)  SCD in place, pt tolerating well      Problem: Pain Management  Goal: Pain level will decrease to patient's comfort goal  Pt refuses any pain meds. Pt using polar ice machine and resting comfortably

## 2018-08-23 NOTE — DISCHARGE PLANNING
Received Choice form at [phone]  Agency/Facility Name: [facility]  Referral sent per Choice form @ [phone]

## 2018-08-23 NOTE — DISCHARGE PLANNING
Anticipated Discharge Disposition:SNF    Action:Pt needs SNF for 6wks IVABX, Hx meth abuse, can't do home/oupt infusion. Referred to all SNFs    Barriers to Discharge:Mcaid and meth abuse    Plan: ???

## 2018-08-23 NOTE — PROGRESS NOTES
"Pharmacy Kinetics 58 y.o. male on vancomycin day # 3 2018    Currently on Vancomycin 1300 mg iv q12hr    Indication for Treatment: SSTI     Pertinent history per medical record: Admitted on 2018 for infected left TKA .  He is s/p resection of his arthroplasty on .  Per Dr. Boston, ID consult is pending.     Other antibiotics: cefazolin 2 gm IV Q8H for 3 days     Allergies: Patient has no known allergies.      List concerns for renal function: none     Pertinent cultures to date:   18 tissue, left knee: NGTD  18 tissue, left knee #2: NGTD  18 tissue, left knee #3: NGTD    Recent Labs      18   0932   BUN  11   CREATININE  0.82     Recent Labs      18   0923   VANCOTROUGH  13.5      Blood pressure 152/88, pulse 66, temperature (!) 35.8 °C (96.4 °F), resp. rate 19, height 1.829 m (6' 0.01\"), weight 74.8 kg (165 lb), SpO2 99 %. Temp (24hrs), Av.6 °C (97.9 °F), Min:35.8 °C (96.4 °F), Max:37.1 °C (98.7 °F)      A/P   1. Vancomycin dose change: not indicated at this time  2. Next vancomycin level: ~3 days (not yet ordered)  3. Goal trough: 12-16 mcg/mL  4. Comments: trough level drawn today is within therapeutic range and was drawn appropriately prior to the 5th total dose. No new renal labs today. Will continue with current dose. Cultures unrevealing. Still pending ID consult. Will follow.    Emiliana Luevano, PharmD    "

## 2018-08-23 NOTE — CARE PLAN
Problem: Safety  Goal: Will remain free from falls    Intervention: Assess risk factors for falls  Fall risk assessment completed.       Problem: Pain Management  Goal: Pain level will decrease to patient's comfort goal    Intervention: Follow pain managment plan developed in collaboration with patient and Interdisciplinary Team  Medicated for pain per mar.

## 2018-08-23 NOTE — PROGRESS NOTES
" Subjective:      Pain controlled.  Up to bedside chair.  No new complaints.    Objective:  Blood pressure 152/88, pulse 66, temperature (!) 35.8 °C (96.4 °F), resp. rate 19, height 1.829 m (6' 0.01\"), weight 74.8 kg (165 lb), SpO2 99 %.    Recent Labs      08/22/18   0618   HEMOGLOBIN  11.8*   HEMATOCRIT  35.8*     Recent Labs      08/22/18   0932   SODIUM  139   POTASSIUM  4.0   CHLORIDE  105   CO2  28   GLUCOSE  149*   BUN  11   CREATININE  0.82   CALCIUM  8.4*         Intake/Output Summary (Last 24 hours) at 08/23/18 1128  Last data filed at 08/23/18 0600   Gross per 24 hour   Intake                0 ml   Output              600 ml   Net             -600 ml       Comfortable, no distress  Neurologically and vascularly intact with palpable pedal pulses bilaterally.  Dressing C/D/I      Assessment:     Doing well s/p resection total knee arthroplasty.  Cultures pending     Plan:       TTWB in immobilizer  OT/PT  DVT ppx - Lovenox + Sequential Compression Devices  ID consult pending.  Currently Nirali + Vanco  DC planning - anticipate SNF        "

## 2018-08-23 NOTE — DISCHARGE PLANNING
[facility] completed # [id number], discovered meth abuse after PASRR completed, called E[facility]. Spoke to Anastacia, she has left message with [facility] supervisor on how to amend.   LOC completed # [id number]

## 2018-08-23 NOTE — PROGRESS NOTES
Pt expresses being very lethargic today, resting in bed throughout shift. A/Ox4. Up with PT. Ambulated steady. Immobilizer in place. Polar ice beneath immobilizer. Pt hesitant to take pain medications. Education provided. Medicated 1x for pain 8/10 to left knee. SCD to RLE.  in place, pt on room air sats above 95%. Pulling 1500 on IS. Wife at bedside. Reminded to call for assist. Rounding in place. Call light and personal items within reach.

## 2018-08-23 NOTE — PROGRESS NOTES
Pt is AAOx4  Pt friend at the bedside  Pt reports a 7/10 pain level  Pt declines any pain meds at this time and would just like to rest  Polar ice machine on  SCD in place  Dressing CDI  VS WDL  POC discussed  All needs met at this time  Bed in low position  Call light within reach  Rounding in place

## 2018-08-23 NOTE — DISCHARGE PLANNING
Faxed per choice form scanned into Media tab  Agency/Facility Name: [facility] SNFs  Referral sent per Choice form @ [phone]

## 2018-08-24 ENCOUNTER — APPOINTMENT (OUTPATIENT)
Dept: RADIOLOGY | Facility: MEDICAL CENTER | Age: 58
DRG: 468 | End: 2018-08-24
Attending: INTERNAL MEDICINE
Payer: MEDICAID

## 2018-08-24 LAB
BACTERIA TISS AEROBE CULT: NORMAL
GRAM STN SPEC: NORMAL
SIGNIFICANT IND 70042: NORMAL
SITE SITE: NORMAL
SOURCE SOURCE: NORMAL

## 2018-08-24 PROCEDURE — 0JJS3ZZ INSPECTION OF HEAD AND NECK SUBCUTANEOUS TISSUE AND FASCIA, PERCUTANEOUS APPROACH: ICD-10-PCS | Performed by: INTERNAL MEDICINE

## 2018-08-24 PROCEDURE — 99232 SBSQ HOSP IP/OBS MODERATE 35: CPT | Performed by: INTERNAL MEDICINE

## 2018-08-24 PROCEDURE — 700105 HCHG RX REV CODE 258: Performed by: STUDENT IN AN ORGANIZED HEALTH CARE EDUCATION/TRAINING PROGRAM

## 2018-08-24 PROCEDURE — A9270 NON-COVERED ITEM OR SERVICE: HCPCS | Performed by: STUDENT IN AN ORGANIZED HEALTH CARE EDUCATION/TRAINING PROGRAM

## 2018-08-24 PROCEDURE — 700112 HCHG RX REV CODE 229: Performed by: STUDENT IN AN ORGANIZED HEALTH CARE EDUCATION/TRAINING PROGRAM

## 2018-08-24 PROCEDURE — 700111 HCHG RX REV CODE 636 W/ 250 OVERRIDE (IP): Performed by: INTERNAL MEDICINE

## 2018-08-24 PROCEDURE — 36569 INSJ PICC 5 YR+ W/O IMAGING: CPT

## 2018-08-24 PROCEDURE — 700105 HCHG RX REV CODE 258: Performed by: INTERNAL MEDICINE

## 2018-08-24 PROCEDURE — 770001 HCHG ROOM/CARE - MED/SURG/GYN PRIV*

## 2018-08-24 PROCEDURE — 700111 HCHG RX REV CODE 636 W/ 250 OVERRIDE (IP): Performed by: STUDENT IN AN ORGANIZED HEALTH CARE EDUCATION/TRAINING PROGRAM

## 2018-08-24 PROCEDURE — 700102 HCHG RX REV CODE 250 W/ 637 OVERRIDE(OP): Performed by: STUDENT IN AN ORGANIZED HEALTH CARE EDUCATION/TRAINING PROGRAM

## 2018-08-24 RX ORDER — LIDOCAINE HYDROCHLORIDE 10 MG/ML
2 INJECTION, SOLUTION INFILTRATION; PERINEURAL
Status: DISCONTINUED | OUTPATIENT
Start: 2018-08-24 | End: 2018-08-30 | Stop reason: HOSPADM

## 2018-08-24 RX ADMIN — DOCUSATE SODIUM 100 MG: 100 CAPSULE, LIQUID FILLED ORAL at 05:20

## 2018-08-24 RX ADMIN — ASPIRIN 81 MG: 81 TABLET, DELAYED RELEASE ORAL at 17:29

## 2018-08-24 RX ADMIN — DOCUSATE SODIUM 100 MG: 100 CAPSULE, LIQUID FILLED ORAL at 17:30

## 2018-08-24 RX ADMIN — CELECOXIB 200 MG: 200 CAPSULE ORAL at 17:30

## 2018-08-24 RX ADMIN — ASPIRIN 81 MG: 81 TABLET, DELAYED RELEASE ORAL at 05:20

## 2018-08-24 RX ADMIN — ACETAMINOPHEN 1000 MG: 500 TABLET, FILM COATED ORAL at 17:30

## 2018-08-24 RX ADMIN — ACETAMINOPHEN 1000 MG: 500 TABLET, FILM COATED ORAL at 05:20

## 2018-08-24 RX ADMIN — OXYCODONE HYDROCHLORIDE 10 MG: 10 TABLET ORAL at 17:29

## 2018-08-24 RX ADMIN — VANCOMYCIN HYDROCHLORIDE 1300 MG: 100 INJECTION, POWDER, LYOPHILIZED, FOR SOLUTION INTRAVENOUS at 22:27

## 2018-08-24 RX ADMIN — CEFTRIAXONE SODIUM 2 G: 2 INJECTION, POWDER, FOR SOLUTION INTRAMUSCULAR; INTRAVENOUS at 05:19

## 2018-08-24 RX ADMIN — VANCOMYCIN HYDROCHLORIDE 1300 MG: 100 INJECTION, POWDER, LYOPHILIZED, FOR SOLUTION INTRAVENOUS at 10:36

## 2018-08-24 ASSESSMENT — PAIN SCALES - GENERAL
PAINLEVEL_OUTOF10: 5
PAINLEVEL_OUTOF10: 3
PAINLEVEL_OUTOF10: 5
PAINLEVEL_OUTOF10: 7
PAINLEVEL_OUTOF10: 5

## 2018-08-24 ASSESSMENT — ENCOUNTER SYMPTOMS
CHILLS: 0
NAUSEA: 0
DIARRHEA: 0
FEVER: 0
VOMITING: 0

## 2018-08-24 NOTE — PROGRESS NOTES
Vascular Access Team     Date of Insertion: 8/24/18  Arm Circumference: 33  Internal length: 45  External Length: hub  Vein Occupancy %: 36  Reason for PICC: Abx   Labs: WBC 5.5, , INR 1.16, BUN 11, Cr 0.82, GFR >60    Consents confirmed, vessel patency confirmed with ultrasound. Risks and benefits of procedure explained to patient and education regarding central line associated bloodstream infections provided. Questions answered.     PICC placed in Right upper arm per MD order with ultrasound guidance, initial arm circumference 33cm. 4 Fr, single lumen PICC placed in brachial vein after 1 attempt(s). 2 cc's of 1% lidocaine injected intradermally, 21 gauge microintroducer needle and modified Seldinger technique used 46cm catheter inserted with good blood return. Secured at hub cm marker. Each lumen flushed without resistance with 10 mL 0.9% normal saline. PICC line secured with Biopatch and Tegaderm.     PICC placement unable to be confirmed with 3CG, CXR ordered. Patient very restless, very uncomfortable from leg and back, having a hard time laying still. Patient experienced some pain, resolved post procedure, without complications.  Patient condition relayed to unit RN or ordering physician via this post procedure note in the EMR.      [employer] Power PICC ref # [id number], Lot # [id number]

## 2018-08-24 NOTE — PROGRESS NOTES
" Subjective:      No events.  Pain tolerable.  No new complaints.    Objective:     Blood pressure 144/82, pulse 68, temperature 36.9 °C (98.5 °F), resp. rate 17, height 1.829 m (6' 0.01\"), weight 74.8 kg (165 lb), SpO2 97 %.    Recent Labs      08/22/18   0618   HEMOGLOBIN  11.8*   HEMATOCRIT  35.8*     Recent Labs      08/22/18   0932   SODIUM  139   POTASSIUM  4.0   CHLORIDE  105   CO2  28   GLUCOSE  149*   BUN  11   CREATININE  0.82   CALCIUM  8.4*         Intake/Output Summary (Last 24 hours) at 08/24/18 1625  Last data filed at 08/24/18 0500   Gross per 24 hour   Intake              350 ml   Output              600 ml   Net             -250 ml       Comfortable, no distress  Neurologically and vascularly intact with palpable pedal pulses bilaterally.  Dressing C/D/I      Assessment:     Doing well s/p resection total knee arthroplasty.  Cultures pending    Plan:       TTWB in immobilizer  OT/PT  DVT ppx - Lovenox + Sequential Compression Devices  IV abx per ID - Vanco and Rocephin  DC planning - can d/c whenever SNF available     "

## 2018-08-24 NOTE — CARE PLAN
Problem: Safety  Goal: Will remain free from falls  Bed alarm in place.  Call light within reach, pt calls appropriately.   Bed in lowest and locked position    Problem: Pain Management  Goal: Pain level will decrease to patient's comfort goal  Pt refuses pain medication but is in pain.  Offered and educated pt on non pharmacological pain control options

## 2018-08-24 NOTE — DISCHARGE SUMMARY
Patient was admitted for resection of an infected total knee arthroplasty.  There were no complications during the surgery. Did well post-operatively.  He had a PICC line placed and IV antibiotics managed by Dr. Muller with ID.  He is being discharged on VANCOMYCIN and CEFTRIAXONE with a stop date of 10/1/18.    Recent Labs      08/22/18   0932   SODIUM  139   POTASSIUM  4.0   CHLORIDE  105   CO2  28   GLUCOSE  149*   BUN  11   CREATININE  0.82   CALCIUM  8.4*     Recent Labs      08/22/18   0618   HEMOGLOBIN  11.8*   HEMATOCRIT  35.8*       There are no active hospital problems to display for this patient.      Uneventful hospital course.     Medication List      START taking these medications      Instructions   acetaminophen 500 MG Tabs  Commonly known as:  TYLENOL   Take 1-2 Tabs by mouth every 6 hours for 28 days.  Dose:  500-1000 mg     aspirin 81 MG Chew chewable tablet  Commonly known as:  ASA   Take 1 Tab by mouth 2 Times a Day for 28 days.  Dose:  81 mg     docusate sodium 100 MG Caps  Commonly known as:  COLACE   Take 1 Cap by mouth 2 times a day.  Dose:  100 mg     meloxicam 7.5 MG Tabs  Commonly known as:  MOBIC   Take 1 Tab by mouth every day for 30 days.  Dose:  7.5 mg     NS SOLN 100 mL with cefTRIAXone 2 GM SOLR 2 g   2 g by Intravenous route every 24 hours for 31 days.  Dose:  2 g     NS SOLN 250 mL with vancomycin 10 GM SOLR 1,100 mg   1,100 mg by Intravenous route every 12 hours for 32 days.  Dose:  15 mg/kg     tramadol 50 MG Tabs  Commonly known as:  ULTRAM   Take 1-2 Tabs by mouth every 6 hours as needed for Moderate Pain for up to 10 days.  Dose:   mg     triamcinolone acetonide 0.1 % Crea  Commonly known as:  KENALOG   Apply 1 Application to affected area(s) 2 times a day.  Dose:  1 Application        STOP taking these medications    albuterol 108 (90 Base) MCG/ACT Aers inhalation aerosol        ASK your doctor about these medications      Instructions   oxyCODONE immediate-release 5  MG Tabs  Commonly known as:  ROXICODONE  Ask about: Should I take this medication?   Take 1-2 Tabs by mouth every four hours as needed for Severe Pain for up to 5 days.  Dose:  5-10 mg              Patient will be discharged to SNF.  He needs to follow up with Dr. Boston in 2 weeks at [facility] - phone number [phone].       Instructions:  -Leave the bandage in place until your followup appointment in 2 weeks.  -Use ice and elevation frequently to help with pain and swelling control  -Toe touch weightbearing  -ROM 0-90 for now  -Encourage terminal extension (lay around with pillow behind the HEEL)  -Weekly SED rate and CRP - send results with him to followup appts  -IV antibiotics per ID orders (VANCOMYCIN and CEFTRIAXONE)  -PICC site cares and flushes per facility protocol.  -Take your blood clot prevention medication for 4 weeks after surgery (aspirin 81mg twice a day).

## 2018-08-24 NOTE — PROGRESS NOTES
Order received for PICC placement. Previously waiting for recommendations from Infectious Disease regarding length of therapy. Per Dr. Muller 6 weeks antibiotics needed, VAT to place PICC.

## 2018-08-24 NOTE — PROGRESS NOTES
Pt is AAOx4  Pt reports a 5/10 pain level  Pt declines any pain meds, non phagological options offered  VS WNL  Knee immobilizer in place  Dressing CDI  POC discussed  All needs met at this time  Bed in low position  Call light within reach  Rounding in place

## 2018-08-24 NOTE — PROGRESS NOTES
"Order received for PICC placement. Per Dr. Muller's note \"Can place PICC line if required by skilled nursing facility.\" and \"Further recommendations per culture results and clinical course.\" Spoke with Denisha SWAN. VAT aware of order.   "

## 2018-08-24 NOTE — PROGRESS NOTES
"Pharmacy Kinetics 58 y.o. male on vancomycin day # 4 2018    Currently on Vancomycin 1300 mg iv q12hr    Indication for Treatment: Infected total knee arthroplasty    Pertinent history per medical record: Admitted on 2018 for Admitted on 2018 for infected left TKA .  He is s/p resection of his arthroplasty on .  ID is consulting.  Cefazolin changed to ceftriaxone per ID on 18.     Other antibiotics: Ceftriaxone 2g IV q24 hrs    Allergies: Patient has no known allergies.     List concerns for renal function: none     Pertinent cultures to date:   18 tissue, left knee: NGTD  18 tissue, left knee #2: NGTD  18 tissue, left knee #3: NGTD    No results for input(s): WBC, NEUTSPOLYS, BANDSSTABS in the last 72 hours.  Recent Labs      18   0932   BUN  11   CREATININE  0.82     Recent Labs      18   0923   VANCOTROUGH  13.5     Intake/Output Summary (Last 24 hours) at 18 0754  Last data filed at 18 0500   Gross per 24 hour   Intake              350 ml   Output              600 ml   Net             -250 ml      Blood pressure 128/92, pulse 81, temperature 36.8 °C (98.2 °F), resp. rate 17, height 1.829 m (6' 0.01\"), weight 74.8 kg (165 lb), SpO2 97 %. Temp (24hrs), Av.6 °C (97.8 °F), Min:35.8 °C (96.4 °F), Max:37.2 °C (98.9 °F)      A/P   1. Vancomycin dose change: No changes, continue vancomycin 1300 mg IV q12 hrs.  2. Next vancomycin level: trough in 2-3 days, not yet ordered  3. Goal trough: 12-16 mcg/mL  4. Comments: Renal labs  WNL.  Last CBC .  Afebrile over interval.  Vancomycin trough 13.5 on , within goal range.  Cultures show NGTD.  ID is consulting, recommends vancomycin to continue as evidence of chronic infection was seen during surgery with purulence and chronic synovitis per notes.  Ancef changed to ceftriaxone per ID.  BMP scheduled for AM labs  to monitor renal function.  Pharmacy will continue to follow.     Davidson Hernandez" PharmD

## 2018-08-24 NOTE — PROGRESS NOTES
Infectious Disease Progress Note    Author: Karolina Muller M.D. Date & Time of service: 2018  2:16 PM    Chief Complaint:  Infected total left knee arthroplasty      Interval History:  58-year-old male admitted for explantation of infected total knee arthroplasty   AF feels OK-declined most of exam  Labs Reviewed, Medications Reviewed, Radiology Reviewed and Wound Reviewed.    Review of Systems:  Review of Systems   Constitutional: Negative for chills and fever.   Gastrointestinal: Negative for diarrhea, nausea and vomiting.   Musculoskeletal: Positive for joint pain.   All other systems reviewed and are negative.      Hemodynamics:  Temp (24hrs), Av.8 °C (98.3 °F), Min:36.6 °C (97.8 °F), Max:37.2 °C (98.9 °F)  Temperature: 36.6 °C (97.8 °F)  Pulse  Av.9  Min: 65  Max: 92   Blood Pressure: 133/72       Physical Exam:  Physical Exam   Constitutional: He appears well-developed.   Thin  Looks older than stated age   HENT:   Head: Normocephalic and atraumatic.   poor dentition   Eyes: EOM are normal.   Neck: Neck supple.   Cardiovascular: Normal rate.    Pulmonary/Chest: Effort normal. No respiratory distress.   Abdominal: Soft. He exhibits no distension.   Musculoskeletal:   Left dressed   Neurological: He is alert.   Skin: No rash noted. He is not diaphoretic.   Nursing note and vitals reviewed.      Meds:    Current Facility-Administered Medications:   •  cefTRIAXone (ROCEPHIN) IV  •  albuterol  •  Pharmacy Consult Request  •  ondansetron  •  dexamethasone  •  diphenhydrAMINE  •  haloperidol lactate  •  scopolamine  •  docusate sodium  •  senna-docusate  •  senna-docusate  •  polyethylene glycol/lytes  •  magnesium hydroxide  •  bisacodyl  •  fleet  •  Respiratory Care per Protocol  •  D5LR  •  aspirin EC  •  acetaminophen  •  [] ketorolac **FOLLOWED BY** celecoxib  •  oxyCODONE immediate-release  •  oxyCODONE immediate release  •  HYDROmorphone  •  zolpidem  •  tramadol  •   diphenhydrAMINE **OR** diphenhydrAMINE  •  chlorproMAZINE **OR** chlorproMAZINE  •  benzocaine-menthol  •  MD ALERT... vancomycin  •  tamsulosin  •  vancomycin    Labs:  Recent Labs      08/22/18   0618   HEMOGLOBIN  11.8*   HEMATOCRIT  35.8*     Recent Labs      08/22/18   0932   SODIUM  139   POTASSIUM  4.0   CHLORIDE  105   CO2  28   GLUCOSE  149*   BUN  11     Recent Labs      08/22/18   0932   CREATININE  0.82       Imaging:  Dx-knee 2- Left    Result Date: 8/21/2018 8/21/2018 4:45 PM HISTORY/REASON FOR EXAM:  Revision of left knee arthroplasty TECHNIQUE/EXAM DESCRIPTION AND NUMBER OF VIEWS:  2 views of the LEFT knee. COMPARISON: 07/24/2018 FINDINGS: Bone density is normal.  There is no evidence of fracture or dislocation.  Postoperative changes are noted with revision of left knee arthroplasty. Screws and opaque cement are identified in the proximal tibia as well as the distal femur. No acute fracture is identified.     1.  Status post revision of left knee arthroplasty. 2.  No acute fracture or malalignment appreciated.      Micro:  Results     Procedure Component Value Units Date/Time    CULTURE TISSUE W/ GRM STAIN [[id number]] Collected:  08/21/18 1459    Order Status:  Completed Specimen:  Tissue Updated:  08/24/18 0828     Gram Stain Result No organisms seen.     Significant Indicator NEG     Source TISS     Site Left Knee     Tissue Culture No growth at 72 hours.    ANAEROBIC CULTURE [[id number]] Collected:  08/21/18 1459    Order Status:  Completed Specimen:  Tissue Updated:  08/24/18 0828     Significant Indicator NEG     Source TISS     Site Left Knee     Anaerobic Culture, Culture Res Culture in progress.    CULTURE TISSUE W/ GRM STAIN [[id number]] Collected:  08/21/18 1458    Order Status:  Completed Specimen:  Tissue Updated:  08/24/18 0827     Gram Stain Result No organisms seen.     Significant Indicator NEG     Source TISS     Site Left Knee #3     Tissue Culture No growth at 72 hours.     ANAEROBIC CULTURE [[id number]] Collected:  08/21/18 1458    Order Status:  Completed Specimen:  Tissue Updated:  08/24/18 0827     Significant Indicator NEG     Source TISS     Site Left Knee #3     Anaerobic Culture, Culture Res Culture in progress.    CULTURE TISSUE W/ GRM STAIN [[id number]] Collected:  08/21/18 1457    Order Status:  Completed Specimen:  Tissue Updated:  08/24/18 0827     Gram Stain Result No organisms seen.     Significant Indicator NEG     Source TISS     Site Left Knee #2     Tissue Culture No growth at 72 hours.    ANAEROBIC CULTURE [[id number]] Collected:  08/21/18 1457    Order Status:  Completed Specimen:  Tissue Updated:  08/24/18 0827     Significant Indicator NEG     Source TISS     Site Left Knee #2     Anaerobic Culture, Culture Res Culture in progress.    CULTURE TISSUE W/ GRM STAIN [[id number]] Collected:  08/21/18 1456    Order Status:  Completed Specimen:  Tissue Updated:  08/24/18 0826     Gram Stain Result No organisms seen.     Significant Indicator NEG     Source TISS     Site Left Knee Tissue     Tissue Culture No growth at 72 hours.    ANAEROBIC CULTURE [[id number]] Collected:  08/21/18 1456    Order Status:  Completed Specimen:  Tissue Updated:  08/24/18 0826     Significant Indicator NEG     Source TISS     Site Left Knee Tissue     Anaerobic Culture, Culture Res Culture in progress.    GRAM STAIN [[id number]] Collected:  08/21/18 1459    Order Status:  Completed Specimen:  Tissue Updated:  08/22/18 0827     Significant Indicator .     Source TISS     Site Left Knee     Gram Stain Result No organisms seen.    GRAM STAIN [[id number]] Collected:  08/21/18 1458    Order Status:  Completed Specimen:  Tissue Updated:  08/22/18 0827     Significant Indicator .     Source TISS     Site Left Knee #3     Gram Stain Result No organisms seen.    GRAM STAIN [[id number]] Collected:  08/21/18 1457    Order Status:  Completed Specimen:  Tissue Updated:  08/22/18 0826     Significant Indicator .      Source TISS     Site Left Knee #2     Gram Stain Result No organisms seen.    GRAM STAIN [[id number]] Collected:  08/21/18 1455    Order Status:  Completed Specimen:  Tissue Updated:  08/22/18 0822     Significant Indicator .     Source TISS     Site Left Knee Tissue     Gram Stain Result No organisms seen.          Assessment:  Infected total left knee arthroplasty  Meth abuse      Plan:  Infected total left knee arthroplasty  Afebrile  No current leukocytosis  States had 6 surgeries in [place] but denies infection  Cultures so far are negative  Evidence of chronic infection was seen during surgery with purulence and chronic synovitis.   Continue vancomycin and ceftriaxone    History of methamphetamine use  Not a candidate for outpatient infusion or treatment  HIV and Hep c neg    MELISSA RN

## 2018-08-24 NOTE — PROGRESS NOTES
Called RN covering for bedside RN. Updated, PICC line not in correct location. Line to be removed, will attempt to reinsert.

## 2018-08-24 NOTE — PROGRESS NOTES
Awake, A&O, VSS, BUI.  Left knee dressing is CDI, immobilizer in place.  Declines taking pain medication.  Polar ice at bedside, declines to wear at this time.  IVF infusing.  Ambulating to BR with SBA.  POC discussed, pt agrees and verbalizes understanding.  Hourly rounding in place, call light is within reach.  Assessment completed as charted.

## 2018-08-25 ENCOUNTER — APPOINTMENT (OUTPATIENT)
Dept: RADIOLOGY | Facility: MEDICAL CENTER | Age: 58
DRG: 468 | End: 2018-08-25
Attending: INTERNAL MEDICINE
Payer: MEDICAID

## 2018-08-25 LAB
ANION GAP SERPL CALC-SCNC: 9 MMOL/L (ref 0–11.9)
BUN SERPL-MCNC: 13 MG/DL (ref 8–22)
CALCIUM SERPL-MCNC: 8.9 MG/DL (ref 8.5–10.5)
CHLORIDE SERPL-SCNC: 103 MMOL/L (ref 96–112)
CO2 SERPL-SCNC: 25 MMOL/L (ref 20–33)
CREAT SERPL-MCNC: 0.75 MG/DL (ref 0.5–1.4)
GLUCOSE SERPL-MCNC: 136 MG/DL (ref 65–99)
POTASSIUM SERPL-SCNC: 3.9 MMOL/L (ref 3.6–5.5)
SODIUM SERPL-SCNC: 137 MMOL/L (ref 135–145)

## 2018-08-25 PROCEDURE — 80048 BASIC METABOLIC PNL TOTAL CA: CPT

## 2018-08-25 PROCEDURE — 770001 HCHG ROOM/CARE - MED/SURG/GYN PRIV*

## 2018-08-25 PROCEDURE — 99232 SBSQ HOSP IP/OBS MODERATE 35: CPT | Performed by: INTERNAL MEDICINE

## 2018-08-25 PROCEDURE — 700111 HCHG RX REV CODE 636 W/ 250 OVERRIDE (IP): Performed by: INTERNAL MEDICINE

## 2018-08-25 PROCEDURE — A9270 NON-COVERED ITEM OR SERVICE: HCPCS | Performed by: STUDENT IN AN ORGANIZED HEALTH CARE EDUCATION/TRAINING PROGRAM

## 2018-08-25 PROCEDURE — 700112 HCHG RX REV CODE 229: Performed by: STUDENT IN AN ORGANIZED HEALTH CARE EDUCATION/TRAINING PROGRAM

## 2018-08-25 PROCEDURE — B54MZZA ULTRASONOGRAPHY OF RIGHT UPPER EXTREMITY VEINS, GUIDANCE: ICD-10-PCS | Performed by: INTERNAL MEDICINE

## 2018-08-25 PROCEDURE — 700111 HCHG RX REV CODE 636 W/ 250 OVERRIDE (IP): Performed by: STUDENT IN AN ORGANIZED HEALTH CARE EDUCATION/TRAINING PROGRAM

## 2018-08-25 PROCEDURE — 05HY33Z INSERTION OF INFUSION DEVICE INTO UPPER VEIN, PERCUTANEOUS APPROACH: ICD-10-PCS | Performed by: INTERNAL MEDICINE

## 2018-08-25 PROCEDURE — 700102 HCHG RX REV CODE 250 W/ 637 OVERRIDE(OP): Performed by: STUDENT IN AN ORGANIZED HEALTH CARE EDUCATION/TRAINING PROGRAM

## 2018-08-25 PROCEDURE — 700105 HCHG RX REV CODE 258: Performed by: STUDENT IN AN ORGANIZED HEALTH CARE EDUCATION/TRAINING PROGRAM

## 2018-08-25 PROCEDURE — 700105 HCHG RX REV CODE 258: Performed by: INTERNAL MEDICINE

## 2018-08-25 PROCEDURE — 36569 INSJ PICC 5 YR+ W/O IMAGING: CPT

## 2018-08-25 RX ADMIN — CEFTRIAXONE SODIUM 2 G: 2 INJECTION, POWDER, FOR SOLUTION INTRAMUSCULAR; INTRAVENOUS at 05:35

## 2018-08-25 RX ADMIN — TAMSULOSIN HYDROCHLORIDE 0.4 MG: 0.4 CAPSULE ORAL at 09:35

## 2018-08-25 RX ADMIN — ACETAMINOPHEN 1000 MG: 500 TABLET, FILM COATED ORAL at 18:04

## 2018-08-25 RX ADMIN — OXYCODONE HYDROCHLORIDE 10 MG: 10 TABLET ORAL at 12:34

## 2018-08-25 RX ADMIN — OXYCODONE HYDROCHLORIDE 10 MG: 10 TABLET ORAL at 18:04

## 2018-08-25 RX ADMIN — CELECOXIB 200 MG: 200 CAPSULE ORAL at 09:35

## 2018-08-25 RX ADMIN — VANCOMYCIN HYDROCHLORIDE 1300 MG: 100 INJECTION, POWDER, LYOPHILIZED, FOR SOLUTION INTRAVENOUS at 09:34

## 2018-08-25 RX ADMIN — OXYCODONE HYDROCHLORIDE 10 MG: 10 TABLET ORAL at 06:48

## 2018-08-25 RX ADMIN — VANCOMYCIN HYDROCHLORIDE 1300 MG: 100 INJECTION, POWDER, LYOPHILIZED, FOR SOLUTION INTRAVENOUS at 21:04

## 2018-08-25 RX ADMIN — DOCUSATE SODIUM 100 MG: 100 CAPSULE, LIQUID FILLED ORAL at 18:04

## 2018-08-25 RX ADMIN — ASPIRIN 81 MG: 81 TABLET, DELAYED RELEASE ORAL at 18:04

## 2018-08-25 RX ADMIN — ACETAMINOPHEN 1000 MG: 500 TABLET, FILM COATED ORAL at 12:34

## 2018-08-25 RX ADMIN — CELECOXIB 200 MG: 200 CAPSULE ORAL at 18:04

## 2018-08-25 ASSESSMENT — PAIN SCALES - GENERAL
PAINLEVEL_OUTOF10: 0
PAINLEVEL_OUTOF10: 7
PAINLEVEL_OUTOF10: 6
PAINLEVEL_OUTOF10: 7
PAINLEVEL_OUTOF10: 5
PAINLEVEL_OUTOF10: 5
PAINLEVEL_OUTOF10: 8

## 2018-08-25 ASSESSMENT — ENCOUNTER SYMPTOMS: FEVER: 0

## 2018-08-25 NOTE — PROGRESS NOTES
Infectious Disease Progress Note    Author: Karolina Muller M.D. Date & Time of service: 2018  4:03 PM    Chief Complaint:  Infected total left knee arthroplasty      Interval History:  58-year-old male admitted for explantation of infected total knee arthroplasty   AF feels OK-declined most of exam   AF sleeping NAD  Labs Reviewed, Medications Reviewed, Radiology Reviewed and Wound Reviewed.    Review of Systems:  Review of Systems   Unable to perform ROS: Other   Constitutional: Negative for fever.       Hemodynamics:  Temp (24hrs), Av.6 °C (97.9 °F), Min:36.2 °C (97.2 °F), Max:36.9 °C (98.4 °F)  Temperature: 36.8 °C (98.3 °F)  Pulse  Av.7  Min: 65  Max: 92   Blood Pressure: 109/66       Physical Exam:  Physical Exam   Constitutional: He appears well-developed. No distress.   Thin  Looks older than stated age   HENT:   Head: Normocephalic and atraumatic.   poor dentition   Neck: Neck supple.   Cardiovascular: Normal rate.    Pulmonary/Chest: Effort normal. No respiratory distress.   Abdominal: Soft. He exhibits no distension.   Musculoskeletal:   Left dressed   Skin: Skin is warm.   Nursing note and vitals reviewed.      Meds:    Current Facility-Administered Medications:   •  lidocaine  •  cefTRIAXone (ROCEPHIN) IV  •  albuterol  •  Pharmacy Consult Request  •  ondansetron  •  dexamethasone  •  diphenhydrAMINE  •  haloperidol lactate  •  scopolamine  •  docusate sodium  •  senna-docusate  •  senna-docusate  •  polyethylene glycol/lytes  •  magnesium hydroxide  •  bisacodyl  •  fleet  •  Respiratory Care per Protocol  •  D5LR  •  aspirin EC  •  acetaminophen  •  [] ketorolac **FOLLOWED BY** celecoxib  •  oxyCODONE immediate-release  •  oxyCODONE immediate release  •  HYDROmorphone  •  zolpidem  •  tramadol  •  diphenhydrAMINE **OR** diphenhydrAMINE  •  chlorproMAZINE **OR** chlorproMAZINE  •  benzocaine-menthol  •  MD ALERT... vancomycin  •  tamsulosin  •  vancomycin    Labs:  No  results for input(s): WBC, RBC, HEMOGLOBIN, HEMATOCRIT, MCV, MCH, RDW, PLATELETCT, MPV, NEUTSPOLYS, LYMPHOCYTES, MONOCYTES, EOSINOPHILS, BASOPHILS, RBCMORPHOLO in the last 72 hours.  Recent Labs      08/25/18   0357   SODIUM  137   POTASSIUM  3.9   CHLORIDE  103   CO2  25   GLUCOSE  136*   BUN  13     Recent Labs      08/25/18   0357   CREATININE  0.75       Imaging:  Dx-knee 2- Left    Result Date: 8/21/2018 8/21/2018 4:45 PM HISTORY/REASON FOR EXAM:  Revision of left knee arthroplasty TECHNIQUE/EXAM DESCRIPTION AND NUMBER OF VIEWS:  2 views of the LEFT knee. COMPARISON: 07/24/2018 FINDINGS: Bone density is normal.  There is no evidence of fracture or dislocation.  Postoperative changes are noted with revision of left knee arthroplasty. Screws and opaque cement are identified in the proximal tibia as well as the distal femur. No acute fracture is identified.     1.  Status post revision of left knee arthroplasty. 2.  No acute fracture or malalignment appreciated.      Micro:  Results     Procedure Component Value Units Date/Time    CULTURE TISSUE W/ GRM STAIN [[id number]] Collected:  08/21/18 1459    Order Status:  Completed Specimen:  Tissue Updated:  08/24/18 0828     Gram Stain Result No organisms seen.     Significant Indicator NEG     Source TISS     Site Left Knee     Tissue Culture No growth at 72 hours.    ANAEROBIC CULTURE [[id number]] Collected:  08/21/18 1459    Order Status:  Completed Specimen:  Tissue Updated:  08/24/18 0828     Significant Indicator NEG     Source TISS     Site Left Knee     Anaerobic Culture, Culture Res Culture in progress.    CULTURE TISSUE W/ GRM STAIN [[id number]] Collected:  08/21/18 1458    Order Status:  Completed Specimen:  Tissue Updated:  08/24/18 0827     Gram Stain Result No organisms seen.     Significant Indicator NEG     Source TISS     Site Left Knee #3     Tissue Culture No growth at 72 hours.    ANAEROBIC CULTURE [[id number]] Collected:  08/21/18 1458    Order Status:   Completed Specimen:  Tissue Updated:  08/24/18 0827     Significant Indicator NEG     Source TISS     Site Left Knee #3     Anaerobic Culture, Culture Res Culture in progress.    CULTURE TISSUE W/ GRM STAIN [[id number]] Collected:  08/21/18 1457    Order Status:  Completed Specimen:  Tissue Updated:  08/24/18 0827     Gram Stain Result No organisms seen.     Significant Indicator NEG     Source TISS     Site Left Knee #2     Tissue Culture No growth at 72 hours.    ANAEROBIC CULTURE [[id number]] Collected:  08/21/18 1457    Order Status:  Completed Specimen:  Tissue Updated:  08/24/18 0827     Significant Indicator NEG     Source TISS     Site Left Knee #2     Anaerobic Culture, Culture Res Culture in progress.    CULTURE TISSUE W/ GRM STAIN [[id number]] Collected:  08/21/18 1456    Order Status:  Completed Specimen:  Tissue Updated:  08/24/18 0826     Gram Stain Result No organisms seen.     Significant Indicator NEG     Source TISS     Site Left Knee Tissue     Tissue Culture No growth at 72 hours.    ANAEROBIC CULTURE [[id number]] Collected:  08/21/18 1456    Order Status:  Completed Specimen:  Tissue Updated:  08/24/18 0826     Significant Indicator NEG     Source TISS     Site Left Knee Tissue     Anaerobic Culture, Culture Res Culture in progress.    GRAM STAIN [[id number]] Collected:  08/21/18 1459    Order Status:  Completed Specimen:  Tissue Updated:  08/22/18 0827     Significant Indicator .     Source TISS     Site Left Knee     Gram Stain Result No organisms seen.    GRAM STAIN [[id number]] Collected:  08/21/18 1458    Order Status:  Completed Specimen:  Tissue Updated:  08/22/18 0827     Significant Indicator .     Source TISS     Site Left Knee #3     Gram Stain Result No organisms seen.    GRAM STAIN [[id number]] Collected:  08/21/18 1457    Order Status:  Completed Specimen:  Tissue Updated:  08/22/18 0826     Significant Indicator .     Source TISS     Site Left Knee #2     Gram Stain Result No organisms  seen.    GRAM STAIN [[id number]] Collected:  08/21/18 1456    Order Status:  Completed Specimen:  Tissue Updated:  08/22/18 0822     Significant Indicator .     Source TISS     Site Left Knee Tissue     Gram Stain Result No organisms seen.          Assessment:  Infected total left knee arthroplasty  Meth abuse      Plan:  Infected total left knee arthroplasty  Afebrile  No current leukocytosis  States had 6 surgeries in [place] but denies infection  Cultures negative  Evidence of chronic infection was seen during surgery with purulence and chronic synovitis.   Continue vancomycin and ceftriaxone  Monitor renal function and vanco trough while on vanco  Anticipate 6 weeks post-op  Stop date 10/1/2018    History of methamphetamine use  Not a candidate for outpatient infusion or treatment  HIV and Hep c neg    Plan for SNF

## 2018-08-25 NOTE — CARE PLAN
Problem: Infection  Goal: Will remain free from infection    Intervention: Implement standard precautions and perform hand washing before and after patient contact  Patient receiving IV antibiotics for his infection. Hand hygiene performed before and after patient contact to prevent further spread of disease.       Problem: Pain Management  Goal: Pain level will decrease to patient's comfort goal    Intervention: Follow pain managment plan developed in collaboration with patient and Interdisciplinary Team  Patient is hesitant to take pain medications. Patient educated that in order to mobilize and recover most effectively, pain should be under control and managed with medication and non pharmacologic interventions.

## 2018-08-25 NOTE — PROGRESS NOTES
Left knee dressing is CDI, immobilizer in place at all times.  IV patent, IVF infusing.  Medicated for pain per mar.  Ambulates with SBA and FWW, gait is steady.  POC discussed, pt agrees and verbalizes understanding.  Hourly rounding in place, call light is within reach.  Assessment completed as charted.

## 2018-08-25 NOTE — PROGRESS NOTES
"Pharmacy Kinetics 58 y.o. male on vancomycin day # 5  2018    Currently on Vancomycin 1300 mg iv q12hr    Indication for Treatment: Infected total knee arthroplasty     Pertinent history per medical record: Admitted on 2018 for Admitted on 2018 for infected left TKA .  He is s/p resection of his arthroplasty on .  ID is consulting.  Cefazolin changed to ceftriaxone per ID on 18.      Other antibiotics: Ceftriaxone 2g IV q24 hrs    Allergies: Patient has no known allergies.     List concerns for renal function: None    Pertinent cultures to date:   18 tissue, left knee: NGTD  18 tissue, left knee #2: NGTD  18 tissue, left knee #3: NGTD    No results for input(s): WBC, NEUTSPOLYS, BANDSSTABS in the last 72 hours.  Recent Labs      18   0357   BUN  13   CREATININE  0.75     Recent Labs      18   0923   VANCOTROUGH  13.5     Intake/Output Summary (Last 24 hours) at 18 1138  Last data filed at 18 0600   Gross per 24 hour   Intake                0 ml   Output              575 ml   Net             -575 ml      Blood pressure 131/80, pulse 71, temperature 36.4 °C (97.6 °F), resp. rate 16, height 1.829 m (6' 0.01\"), weight 74.8 kg (165 lb), SpO2 96 %. Temp (24hrs), Av.6 °C (97.9 °F), Min:36.2 °C (97.2 °F), Max:36.9 °C (98.5 °F)      A/P   1. Vancomycin dose change: No  2. Next vancomycin level: 1-2 days (not ordered)  3. Goal trough: 12-16 mcg/ml  4. Comments: Renal function stable. Pt in goal range in current vanco dosing. Cultures with no growth so far. ID following. Will continue current vanco dosing.    Josh Crocker, PharmD    "

## 2018-08-25 NOTE — PROGRESS NOTES
Vascular Access Team     Date of Insertion: 8/25/18  Arm Circumference: 28cm  Internal length: 46cm  External Length: hubbed  Vein Occupancy %: 33%  Reason for PICC: Antibiotic Therapy  Labs: on 8/17/18 WBC 5.5, , INR 1.16, on 8/25/18 BUN 13, Cr 0.75, GFR >60    Consents confirmed, vessel patency confirmed with ultrasound. Risks and benefits of procedure explained to patient and education regarding central line associated bloodstream infections provided. Questions answered.     PICC placed in RUE per MD order with ultrasound guidance, initial arm circumference 28cm. 4 Fr, single lumen PICC placed in basilic vein after 1 attempt(s). 1 cc's of 1% lidocaine injected intradermally, 21 gauge microintroducer needle and modified Seldinger technique used. 46 cm catheter inserted with good blood return. Secured at hub marker. Each lumen flushed without resistance with 10 mL 0.9% normal saline. PICC line secured with Biopatch and Tegaderm.     PICC placement is confirmed by nurse using 3CG technology. PICC line is appropriate for use at this time. Patient tolerated procedure well, without complications.  Patient condition relayed to unit RN or ordering physician via this post procedure note in the EMR.      [employer] PICC ref # [id number], Lot # [id number]

## 2018-08-25 NOTE — PROGRESS NOTES
Late entry:     PICC line removed per vascular tech request.  PICC in wrong place place per xray.  Line removed without incident, patient tolerated well.  Line intact upon removal.

## 2018-08-25 NOTE — PROGRESS NOTES
"Ortho Progress note    8/21/18 Dr. Boston - Resection arthroplasty left total knee    Doing well today, would like to ambulate more and to shower    /80   Pulse 71   Temp 36.4 °C (97.6 °F)   Resp 16   Ht 1.829 m (6' 0.01\")   Wt 74.8 kg (165 lb)   SpO2 96%   BMI 22.37 kg/m²       LLE: Dressing in place, clean and dry.  Able to flex and extend ankle and great toe.  SILT S/S/SP/DP/T dist.    Doing well POD #4    TDWB in immobilizer  PT daily if possible  Shower with dressing protected  DVT Proph - Lovenox  Abx per ID, appreciate assistance  DC to SNF when accepted    Louis Le MD    "

## 2018-08-26 LAB
BACTERIA SPEC ANAEROBE CULT: NORMAL
SIGNIFICANT IND 70042: NORMAL
SITE SITE: NORMAL
SOURCE SOURCE: NORMAL

## 2018-08-26 PROCEDURE — 770001 HCHG ROOM/CARE - MED/SURG/GYN PRIV*

## 2018-08-26 PROCEDURE — 700102 HCHG RX REV CODE 250 W/ 637 OVERRIDE(OP): Performed by: STUDENT IN AN ORGANIZED HEALTH CARE EDUCATION/TRAINING PROGRAM

## 2018-08-26 PROCEDURE — 99232 SBSQ HOSP IP/OBS MODERATE 35: CPT | Performed by: INTERNAL MEDICINE

## 2018-08-26 PROCEDURE — 700105 HCHG RX REV CODE 258: Performed by: INTERNAL MEDICINE

## 2018-08-26 PROCEDURE — 700105 HCHG RX REV CODE 258: Performed by: STUDENT IN AN ORGANIZED HEALTH CARE EDUCATION/TRAINING PROGRAM

## 2018-08-26 PROCEDURE — 700111 HCHG RX REV CODE 636 W/ 250 OVERRIDE (IP): Performed by: INTERNAL MEDICINE

## 2018-08-26 PROCEDURE — 700111 HCHG RX REV CODE 636 W/ 250 OVERRIDE (IP): Performed by: STUDENT IN AN ORGANIZED HEALTH CARE EDUCATION/TRAINING PROGRAM

## 2018-08-26 PROCEDURE — A9270 NON-COVERED ITEM OR SERVICE: HCPCS | Performed by: STUDENT IN AN ORGANIZED HEALTH CARE EDUCATION/TRAINING PROGRAM

## 2018-08-26 RX ADMIN — TAMSULOSIN HYDROCHLORIDE 0.4 MG: 0.4 CAPSULE ORAL at 09:38

## 2018-08-26 RX ADMIN — CELECOXIB 200 MG: 200 CAPSULE ORAL at 09:38

## 2018-08-26 RX ADMIN — VANCOMYCIN HYDROCHLORIDE 1300 MG: 100 INJECTION, POWDER, LYOPHILIZED, FOR SOLUTION INTRAVENOUS at 09:38

## 2018-08-26 RX ADMIN — ASPIRIN 81 MG: 81 TABLET, DELAYED RELEASE ORAL at 16:20

## 2018-08-26 RX ADMIN — CEFTRIAXONE SODIUM 2 G: 2 INJECTION, POWDER, FOR SOLUTION INTRAMUSCULAR; INTRAVENOUS at 06:30

## 2018-08-26 RX ADMIN — ACETAMINOPHEN 1000 MG: 500 TABLET, FILM COATED ORAL at 12:04

## 2018-08-26 RX ADMIN — VANCOMYCIN HYDROCHLORIDE 1300 MG: 100 INJECTION, POWDER, LYOPHILIZED, FOR SOLUTION INTRAVENOUS at 23:09

## 2018-08-26 RX ADMIN — OXYCODONE HYDROCHLORIDE 10 MG: 10 TABLET ORAL at 23:08

## 2018-08-26 RX ADMIN — ASPIRIN 81 MG: 81 TABLET, DELAYED RELEASE ORAL at 09:38

## 2018-08-26 ASSESSMENT — ENCOUNTER SYMPTOMS: FEVER: 0

## 2018-08-26 ASSESSMENT — PAIN SCALES - GENERAL
PAINLEVEL_OUTOF10: 0
PAINLEVEL_OUTOF10: 8
PAINLEVEL_OUTOF10: 0
PAINLEVEL_OUTOF10: 0

## 2018-08-26 NOTE — PROGRESS NOTES
"Pharmacy Kinetics 58 y.o. male on vancomycin day # 6 2018    Currently on Vancomycin 1300 mg iv q12hr    Indication for Treatment: Infected total knee arthroplasty     Pertinent history per medical record: Admitted on 2018 for Admitted on 2018 for infected left TKA .  He is s/p resection of his arthroplasty on .  ID is consulting.  Cefazolin changed to ceftriaxone per ID on 18.      Other antibiotics: Ceftriaxone 2g IV q24 hrs    Allergies: Patient has no known allergies.     List concerns for renal function: none    Pertinent cultures to date:   18 tissue, left knee: NGTD  18 tissue, left knee #2: NGTD  18 tissue, left knee #3: NGTD    No results for input(s): WBC, NEUTSPOLYS, BANDSSTABS in the last 72 hours.  Recent Labs      18   0357   BUN  13   CREATININE  0.75     No results for input(s): VANCOTROUGH, VANCOPEAK, VANCORANDOM in the last 72 hours.  Intake/Output Summary (Last 24 hours) at 18 1242  Last data filed at 18 0948   Gross per 24 hour   Intake              250 ml   Output              425 ml   Net             -175 ml      Blood pressure 116/67, pulse 80, temperature 36.7 °C (98.1 °F), resp. rate 16, height 1.829 m (6' 0.01\"), weight 77.1 kg (169 lb 15.6 oz), SpO2 93 %. Temp (24hrs), Av.6 °C (97.8 °F), Min:36.3 °C (97.3 °F), Max:36.7 °C (98.1 °F)      A/P   1. Vancomycin dose change: Not indicated at this time  2. Next vancomycin level: 0930 tomorrow  3. Goal trough: 12-16 mcg/mL  4. Comments: No significant changes in patient condition. Continue current vancomycin dose and frequency.    Ghada Weber, PharmD.      "

## 2018-08-26 NOTE — PROGRESS NOTES
Pt up to shower and ambulated in hallway today. Reports 0/10 pain this shift. VSS. Calls appropriately.

## 2018-08-26 NOTE — CARE PLAN
Problem: Communication  Goal: The ability to communicate needs accurately and effectively will improve  Outcome: MET Date Met: 08/26/18  Pt able to effectively state needs including toileting, ambulation, shower.    Problem: Safety  Goal: Will remain free from injury  Outcome: PROGRESSING AS EXPECTED  Pt calls appropriately and waits for staff assistance. Standby assist with fww. Bed alarm on, non skid socks on, call light within reach, assistive equipment stored in bathroom, path free of clutter, rounding in place.    Problem: Fluid Volume:  Goal: Will maintain balanced intake and output  Outcome: PROGRESSING AS EXPECTED  Tolerating PO fluids with no difficulty. Voiding clear yellow urine several times per shift.

## 2018-08-26 NOTE — PROGRESS NOTES
Infectious Disease Progress Note    Author: Karolina Muller M.D. Date & Time of service: 2018  1:31 PM    Chief Complaint:  Infected total left knee arthroplasty      Interval History:  58-year-old male admitted for explantation of infected total knee arthroplasty   AF feels OK-declined most of exam   AF sleeping NAD   AF sleeping again today-no acute events Has PICC  Labs Reviewed, Medications Reviewed, Radiology Reviewed and Wound Reviewed.    Review of Systems:  Review of Systems   Unable to perform ROS: Other   Constitutional: Negative for fever.       Hemodynamics:  Temp (24hrs), Av.6 °C (97.8 °F), Min:36.3 °C (97.3 °F), Max:36.7 °C (98.1 °F)  Temperature: 36.7 °C (98.1 °F)  Pulse  Av.4  Min: 60  Max: 92   Blood Pressure: 116/67       Physical Exam:  Physical Exam   Constitutional: He appears well-developed. No distress.   Thin  Looks older than stated age   HENT:   Head: Normocephalic and atraumatic.   poor dentition   Neck: Neck supple.   Cardiovascular: Normal rate.    Pulmonary/Chest: Effort normal. No respiratory distress.   Musculoskeletal:   Leg dressed  PICC   Neurological:   sleeping   Skin: Skin is warm. No rash noted.   Nursing note and vitals reviewed.      Meds:    Current Facility-Administered Medications:   •  lidocaine  •  cefTRIAXone (ROCEPHIN) IV  •  albuterol  •  Pharmacy Consult Request  •  ondansetron  •  dexamethasone  •  diphenhydrAMINE  •  haloperidol lactate  •  scopolamine  •  docusate sodium  •  senna-docusate  •  senna-docusate  •  polyethylene glycol/lytes  •  magnesium hydroxide  •  bisacodyl  •  fleet  •  Respiratory Care per Protocol  •  D5LR  •  aspirin EC  •  acetaminophen  •  [] ketorolac **FOLLOWED BY** celecoxib  •  oxyCODONE immediate-release  •  oxyCODONE immediate release  •  HYDROmorphone  •  zolpidem  •  tramadol  •  diphenhydrAMINE **OR** diphenhydrAMINE  •  chlorproMAZINE **OR** chlorproMAZINE  •  benzocaine-menthol  •  MD ALERT...  vancomycin  •  tamsulosin  •  vancomycin    Labs:  No results for input(s): WBC, RBC, HEMOGLOBIN, HEMATOCRIT, MCV, MCH, RDW, PLATELETCT, MPV, NEUTSPOLYS, LYMPHOCYTES, MONOCYTES, EOSINOPHILS, BASOPHILS, RBCMORPHOLO in the last 72 hours.  Recent Labs      08/25/18   0357   SODIUM  137   POTASSIUM  3.9   CHLORIDE  103   CO2  25   GLUCOSE  136*   BUN  13     Recent Labs      08/25/18   0357   CREATININE  0.75       Imaging:  Dx-knee 2- Left    Result Date: 8/21/2018 8/21/2018 4:45 PM HISTORY/REASON FOR EXAM:  Revision of left knee arthroplasty TECHNIQUE/EXAM DESCRIPTION AND NUMBER OF VIEWS:  2 views of the LEFT knee. COMPARISON: 07/24/2018 FINDINGS: Bone density is normal.  There is no evidence of fracture or dislocation.  Postoperative changes are noted with revision of left knee arthroplasty. Screws and opaque cement are identified in the proximal tibia as well as the distal femur. No acute fracture is identified.     1.  Status post revision of left knee arthroplasty. 2.  No acute fracture or malalignment appreciated.      Micro:  Results     Procedure Component Value Units Date/Time    CULTURE TISSUE W/ GRM STAIN [[id number]] Collected:  08/21/18 1459    Order Status:  Completed Specimen:  Tissue Updated:  08/26/18 1233     Gram Stain Result No organisms seen.     Significant Indicator NEG     Source TISS     Site Left Knee     Tissue Culture No growth at 72 hours.    ANAEROBIC CULTURE [[id number]] Collected:  08/21/18 1459    Order Status:  Completed Specimen:  Tissue Updated:  08/26/18 1233     Significant Indicator NEG     Source TISS     Site Left Knee     Anaerobic Culture, Culture Res No Anaerobes isolated.    CULTURE TISSUE W/ GRM STAIN [[id number]] Collected:  08/21/18 1458    Order Status:  Completed Specimen:  Tissue Updated:  08/26/18 1233     Gram Stain Result No organisms seen.     Significant Indicator NEG     Source TISS     Site Left Knee #3     Tissue Culture No growth at 72 hours.    ANAEROBIC  CULTURE [[id number]] Collected:  08/21/18 1458    Order Status:  Completed Specimen:  Tissue Updated:  08/26/18 1233     Significant Indicator NEG     Source TISS     Site Left Knee #3     Anaerobic Culture, Culture Res No Anaerobes isolated.    CULTURE TISSUE W/ GRM STAIN [[id number]] Collected:  08/21/18 1457    Order Status:  Completed Specimen:  Tissue Updated:  08/26/18 1232     Gram Stain Result No organisms seen.     Significant Indicator NEG     Source TISS     Site Left Knee #2     Tissue Culture No growth at 72 hours.    ANAEROBIC CULTURE [[id number]] Collected:  08/21/18 1457    Order Status:  Completed Specimen:  Tissue Updated:  08/26/18 1232     Significant Indicator NEG     Source TISS     Site Left Knee #2     Anaerobic Culture, Culture Res No Anaerobes isolated.    ANAEROBIC CULTURE [[id number]] Collected:  08/21/18 1456    Order Status:  Completed Specimen:  Tissue Updated:  08/26/18 1232     Significant Indicator NEG     Source TISS     Site Left Knee Tissue     Anaerobic Culture, Culture Res No Anaerobes isolated.    CULTURE TISSUE W/ GRM STAIN [[id number]] Collected:  08/21/18 1456    Order Status:  Completed Specimen:  Tissue Updated:  08/26/18 1232     Gram Stain Result No organisms seen.     Significant Indicator NEG     Source TISS     Site Left Knee Tissue     Tissue Culture No growth at 72 hours.    GRAM STAIN [[id number]] Collected:  08/21/18 1459    Order Status:  Completed Specimen:  Tissue Updated:  08/22/18 0827     Significant Indicator .     Source TISS     Site Left Knee     Gram Stain Result No organisms seen.    GRAM STAIN [[id number]] Collected:  08/21/18 1458    Order Status:  Completed Specimen:  Tissue Updated:  08/22/18 0827     Significant Indicator .     Source TISS     Site Left Knee #3     Gram Stain Result No organisms seen.    GRAM STAIN [[id number]] Collected:  08/21/18 1457    Order Status:  Completed Specimen:  Tissue Updated:  08/22/18 0826     Significant Indicator .      Source TISS     Site Left Knee #2     Gram Stain Result No organisms seen.    GRAM STAIN [[id number]] Collected:  08/21/18 1458    Order Status:  Completed Specimen:  Tissue Updated:  08/22/18 0822     Significant Indicator .     Source TISS     Site Left Knee Tissue     Gram Stain Result No organisms seen.          Assessment:  Infected total left knee arthroplasty  Meth abuse      Plan:  Infected total left knee arthroplasty  Afebrile  No current leukocytosis  States had 6 surgeries in [place] but denies infection  Cultures negative  Evidence of chronic infection was seen during surgery with purulence and chronic synovitis.   Continue vancomycin and ceftriaxone  Monitor renal function and vanco trough while on vanco  Anticipate 6 weeks post-op  Stop date 10/1/2018    History of methamphetamine use  Not a candidate for outpatient infusion or treatment  HIV and Hep c neg    Plan for SNF

## 2018-08-26 NOTE — PROGRESS NOTES
"S:  Seen and examined.  POD #5 s/p L knee resection arthroplasty.  Doing well this morning.  No new complaints, pain controlled.    O: Blood pressure 105/56, pulse 64, temperature 36.5 °C (97.7 °F), resp. rate 17, height 1.829 m (6' 0.01\"), weight 77.1 kg (169 lb 15.6 oz), SpO2 97 %..    Intake/Output Summary (Last 24 hours) at 08/26/18 0690  Last data filed at 08/25/18 1700   Gross per 24 hour   Intake              250 ml   Output                0 ml   Net              250 ml   .    Operative/injured extremity examined.  Compartments soft, distal light touch sensation intact, firing EHL/TA/GS/P.  Toes warm, well-perfused.  Wound dressing c/d/i          A/P:    POD #5 s/p L knee resection arthroplasty    Antibiotics: Per ID  Activity: TTWB, immobilizer operative extremity.  PT today.  Diet: General  DVT: Mechanical (SCDs) + Pharmacologic (Aspirin)  Dispo: D/C planning    "

## 2018-08-26 NOTE — CARE PLAN
Problem: Venous Thromboembolism (VTW)/Deep Vein Thrombosis (DVT) Prevention:  Goal: Patient will participate in Venous Thrombosis (VTE)/Deep Vein Thrombosis (DVT)Prevention Measures    Intervention: Ensure patient wears graduated elastic stockings (WILLY hose) and/or SCDs, if ordered, when in bed or chair (Remove at least once per shift for skin check)  Patient is now accepting SCDs. Patient was wanting to remove them, but when this RN reminded patient that they were in place to help prevent blood clots from forming, patient agreed to continue wearing them while in bed/chair.       Problem: Mobility  Goal: Risk for activity intolerance will decrease    Intervention: Encourage patient to increase activity level in collaboration with Interdisciplinary Team  Patient is ambulating with front wheel walker and standby assist. Ambulates with a steady gait, and is working with PT/OT to increase mobility.

## 2018-08-26 NOTE — CARE PLAN
Problem: Bowel/Gastric:  Goal: Normal bowel function is maintained or improved  Outcome: PROGRESSING AS EXPECTED  Pt reports soft formed BM today. Refuses stool softeners. Encourage continuing mobility. Consuming 100% of meals.

## 2018-08-27 LAB — VANCOMYCIN TROUGH SERPL-MCNC: 22.2 UG/ML (ref 10–20)

## 2018-08-27 PROCEDURE — A9270 NON-COVERED ITEM OR SERVICE: HCPCS | Performed by: STUDENT IN AN ORGANIZED HEALTH CARE EDUCATION/TRAINING PROGRAM

## 2018-08-27 PROCEDURE — 97116 GAIT TRAINING THERAPY: CPT

## 2018-08-27 PROCEDURE — 700111 HCHG RX REV CODE 636 W/ 250 OVERRIDE (IP): Performed by: STUDENT IN AN ORGANIZED HEALTH CARE EDUCATION/TRAINING PROGRAM

## 2018-08-27 PROCEDURE — 700105 HCHG RX REV CODE 258: Performed by: STUDENT IN AN ORGANIZED HEALTH CARE EDUCATION/TRAINING PROGRAM

## 2018-08-27 PROCEDURE — 700105 HCHG RX REV CODE 258: Performed by: INTERNAL MEDICINE

## 2018-08-27 PROCEDURE — 770001 HCHG ROOM/CARE - MED/SURG/GYN PRIV*

## 2018-08-27 PROCEDURE — 99232 SBSQ HOSP IP/OBS MODERATE 35: CPT | Performed by: INTERNAL MEDICINE

## 2018-08-27 PROCEDURE — 80202 ASSAY OF VANCOMYCIN: CPT

## 2018-08-27 PROCEDURE — 700102 HCHG RX REV CODE 250 W/ 637 OVERRIDE(OP): Performed by: STUDENT IN AN ORGANIZED HEALTH CARE EDUCATION/TRAINING PROGRAM

## 2018-08-27 PROCEDURE — 700111 HCHG RX REV CODE 636 W/ 250 OVERRIDE (IP): Performed by: INTERNAL MEDICINE

## 2018-08-27 RX ADMIN — ASPIRIN 81 MG: 81 TABLET, DELAYED RELEASE ORAL at 17:27

## 2018-08-27 RX ADMIN — CEFTRIAXONE SODIUM 2 G: 2 INJECTION, POWDER, FOR SOLUTION INTRAMUSCULAR; INTRAVENOUS at 05:49

## 2018-08-27 RX ADMIN — VANCOMYCIN HYDROCHLORIDE 1300 MG: 100 INJECTION, POWDER, LYOPHILIZED, FOR SOLUTION INTRAVENOUS at 10:54

## 2018-08-27 RX ADMIN — OXYCODONE HYDROCHLORIDE 10 MG: 10 TABLET ORAL at 20:01

## 2018-08-27 RX ADMIN — DOCUSATE SODIUM -SENNOSIDES 1 TABLET: 50; 8.6 TABLET, COATED ORAL at 20:01

## 2018-08-27 RX ADMIN — TAMSULOSIN HYDROCHLORIDE 0.4 MG: 0.4 CAPSULE ORAL at 09:36

## 2018-08-27 RX ADMIN — ASPIRIN 81 MG: 81 TABLET, DELAYED RELEASE ORAL at 05:49

## 2018-08-27 ASSESSMENT — COGNITIVE AND FUNCTIONAL STATUS - GENERAL
MOBILITY SCORE: 20
STANDING UP FROM CHAIR USING ARMS: A LITTLE
MOVING FROM LYING ON BACK TO SITTING ON SIDE OF FLAT BED: A LITTLE
CLIMB 3 TO 5 STEPS WITH RAILING: A LITTLE
SUGGESTED CMS G CODE MODIFIER MOBILITY: CJ
WALKING IN HOSPITAL ROOM: A LITTLE

## 2018-08-27 ASSESSMENT — ENCOUNTER SYMPTOMS
VOMITING: 0
FEVER: 0
ABDOMINAL PAIN: 0
COUGH: 0
SHORTNESS OF BREATH: 0
DIARRHEA: 0
NAUSEA: 0
CHILLS: 0

## 2018-08-27 ASSESSMENT — GAIT ASSESSMENTS
DEVIATION: BRADYKINETIC;STEP TO
GAIT LEVEL OF ASSIST: SUPERVISED
ASSISTIVE DEVICE: FRONT WHEEL WALKER
DISTANCE (FEET): 150

## 2018-08-27 ASSESSMENT — PAIN SCALES - GENERAL
PAINLEVEL_OUTOF10: 0
PAINLEVEL_OUTOF10: 0
PAINLEVEL_OUTOF10: 8
PAINLEVEL_OUTOF10: 0
PAINLEVEL_OUTOF10: 0

## 2018-08-27 NOTE — PROGRESS NOTES
"Pharmacy Kinetics 58 y.o. male on vancomycin day # 7 2018    Currently on Vancomycin 1300 mg iv q12hr (1000, 2200)    Indication for Treatment: Infected total knee arthroplasty     Pertinent history per medical record: Admitted on 2018 for Admitted on 2018 for infected left TKA .  He is s/p resection of his arthroplasty on .  Evidence of chronic infection was seen during surgery with purulence and chronic synovitis.  ID is consulting.  Cefazolin changed to ceftriaxone per ID on 18. IV abx planned to continue x6 weeks with stop date of 10/1/18 per ID.     Other antibiotics: Ceftriaxone 2g IV q24 hrs     Allergies: Patient has no known allergies.      List concerns for renal function: none     Pertinent cultures to date:   18 tissue, left knee: NGTD  18 tissue, left knee #2: NGTD  18 tissue, left knee #3: NGTD    No results for input(s): WBC, NEUTSPOLYS, BANDSSTABS in the last 72 hours.  Recent Labs      18   0357   BUN  13   CREATININE  0.75     No results for input(s): VANCOTROUGH, VANCOPEAK, VANCORANDOM in the last 72 hours.  Intake/Output Summary (Last 24 hours) at 18 0654  Last data filed at 18 1623   Gross per 24 hour   Intake                0 ml   Output              600 ml   Net             -600 ml      Blood pressure 106/68, pulse 89, temperature 36.4 °C (97.6 °F), resp. rate 17, height 1.829 m (6' 0.01\"), weight 77.1 kg (169 lb 15.6 oz), SpO2 98 %. Temp (24hrs), Av.7 °C (98.1 °F), Min:36.4 °C (97.6 °F), Max:36.9 °C (98.4 °F)      A/P   1. Vancomycin dose change: reduce dose to vancomycin 1100 mg IV q12 hrs (at 0200, 1400)  2. Next vancomycin level: ~ 48 hrs along with renal labs, not yet ordered.  3. Goal trough: 12-16 mcg/mL  4. Comments:  Vancomycin trough at 0940 today = 22.2, supratherapeutic.  The dose preceding level was hung 1 hr late which contributed slightly to elevated level. Last dose of 1300 mg given at 1054 today.  Will reduce " vancomycin dosing to 1100 mg IV q12 hrs and begin at 0200 8/28.  Vancomycin planned to end 10/1/2018.  Plan to recheck level in ~ 48 hrs and will recheck renal labs at that time as well.  Pharmacy will continue to follow closely.       Davidson CodyD

## 2018-08-27 NOTE — CARE PLAN
Problem: Bowel/Gastric:  Goal: Will not experience complications related to bowel motility  Outcome: PROGRESSING AS EXPECTED  BM yesterday, stool softeners per MAR. 100% of meals eaten, continue to encourage oral intake. Offer and encourage mobility.    Problem: Respiratory:  Goal: Respiratory status will improve  Outcome: PROGRESSING AS EXPECTED  LS diminished BLL but able to do 1250 IS. Continue to educate on and offer mobility, encourage up to chair, encourage IS.

## 2018-08-27 NOTE — PROGRESS NOTES
" Subjective:      No weekend events.  Cultures NGTD.  No f/c/n/v/SOB.    Objective:  Blood pressure 106/68, pulse 89, temperature 36.4 °C (97.6 °F), resp. rate 17, height 1.829 m (6' 0.01\"), weight 77.1 kg (169 lb 15.6 oz), SpO2 98 %.        Recent Labs      08/25/18   0357   SODIUM  137   POTASSIUM  3.9   CHLORIDE  103   CO2  25   GLUCOSE  136*   BUN  13   CREATININE  0.75   CALCIUM  8.9         Intake/Output Summary (Last 24 hours) at 08/27/18 0702  Last data filed at 08/26/18 1623   Gross per 24 hour   Intake                0 ml   Output              600 ml   Net             -600 ml       Comfortable, no distress  Neurologically and vascularly intact with palpable pedal pulses bilaterally.  Dressing C/D/I      Assessment:    Doing well s/p resection total knee arthroplasty.    Plan:      Abx: Vancomycin and Ceftriaxone.  PICC in place  TTWB in immobilizer  OT/PT  DVT ppx - Lovenox + Sequential Compression Devices  DC planning - can d/c whenever SNF available       "

## 2018-08-27 NOTE — PROGRESS NOTES
Pt does not follow TTWB precautions. Educated by RN, Total Joint Coordinator, and PT Ynes. Attempts to follow but places more weight through LLE than ordered.

## 2018-08-27 NOTE — PROGRESS NOTES
Infectious Disease Progress Note    Author: Suri Pitt M.D. Date & Time of service: 2018  1:16 PM    Chief Complaint:  Infected total left knee arthroplasty      Interval History:  58-year-old male admitted for explantation of infected total knee arthroplasty   AF feels OK-declined most of exam   AF sleeping NAD   AF sleeping again today-no acute events Has PICC   AF no CBC, eating lunch without any new complaints, tolerating antibiotics without any issues, amenable to SNF placement  Labs Reviewed, Medications Reviewed, Radiology Reviewed and Wound Reviewed.    Review of Systems:  Review of Systems   Constitutional: Negative for chills and fever.   Respiratory: Negative for cough and shortness of breath.    Gastrointestinal: Negative for abdominal pain, diarrhea, nausea and vomiting.   Musculoskeletal: Positive for joint pain.       Hemodynamics:  Temp (24hrs), Av.7 °C (98.1 °F), Min:36.4 °C (97.6 °F), Max:36.9 °C (98.4 °F)  Temperature: 36.8 °C (98.3 °F)  Pulse  Av.2  Min: 60  Max: 92   Blood Pressure: 119/73       Physical Exam:  Physical Exam   Constitutional: He is oriented to person, place, and time. He appears well-developed. No distress.   Thin  Looks older than stated age   HENT:   Head: Normocephalic and atraumatic.   poor dentition   Eyes: Pupils are equal, round, and reactive to light. EOM are normal.   Neck: Neck supple.   Cardiovascular: Normal rate.    Pulmonary/Chest: Effort normal. No respiratory distress.   Abdominal: Soft. There is no tenderness.   Musculoskeletal:   Left lower extremity dressed  PICC   Neurological: He is alert and oriented to person, place, and time.   Skin: Skin is warm. No rash noted.   Nursing note and vitals reviewed.      Meds:    Current Facility-Administered Medications:   •  MD ALERT... vancomycin  •  [START ON 2018] cefTRIAXone (ROCEPHIN) IV  •  lidocaine  •  albuterol  •  Pharmacy Consult Request  •  ondansetron  •   dexamethasone  •  diphenhydrAMINE  •  haloperidol lactate  •  scopolamine  •  docusate sodium  •  senna-docusate  •  senna-docusate  •  polyethylene glycol/lytes  •  magnesium hydroxide  •  bisacodyl  •  fleet  •  Respiratory Care per Protocol  •  D5LR  •  aspirin EC  •  oxyCODONE immediate-release  •  oxyCODONE immediate release  •  HYDROmorphone  •  zolpidem  •  tramadol  •  diphenhydrAMINE **OR** diphenhydrAMINE  •  chlorproMAZINE **OR** chlorproMAZINE  •  benzocaine-menthol  •  tamsulosin  •  vancomycin    Labs:  No results for input(s): WBC, RBC, HEMOGLOBIN, HEMATOCRIT, MCV, MCH, RDW, PLATELETCT, MPV, NEUTSPOLYS, LYMPHOCYTES, MONOCYTES, EOSINOPHILS, BASOPHILS, RBCMORPHOLO in the last 72 hours.  Recent Labs      08/25/18   0357   SODIUM  137   POTASSIUM  3.9   CHLORIDE  103   CO2  25   GLUCOSE  136*   BUN  13     Recent Labs      08/25/18   0357   CREATININE  0.75       Imaging:  Dx-knee 2- Left    Result Date: 8/21/2018 8/21/2018 4:45 PM HISTORY/REASON FOR EXAM:  Revision of left knee arthroplasty TECHNIQUE/EXAM DESCRIPTION AND NUMBER OF VIEWS:  2 views of the LEFT knee. COMPARISON: 07/24/2018 FINDINGS: Bone density is normal.  There is no evidence of fracture or dislocation.  Postoperative changes are noted with revision of left knee arthroplasty. Screws and opaque cement are identified in the proximal tibia as well as the distal femur. No acute fracture is identified.     1.  Status post revision of left knee arthroplasty. 2.  No acute fracture or malalignment appreciated.      Micro:  Results     Procedure Component Value Units Date/Time    CULTURE TISSUE W/ GRM STAIN [[id number]] Collected:  08/21/18 5099    Order Status:  Completed Specimen:  Tissue Updated:  08/26/18 1233     Gram Stain Result No organisms seen.     Significant Indicator NEG     Source TISS     Site Left Knee     Tissue Culture No growth at 72 hours.    ANAEROBIC CULTURE [[id number]] Collected:  08/21/18 9299    Order Status:  Completed  Specimen:  Tissue Updated:  08/26/18 1233     Significant Indicator NEG     Source TISS     Site Left Knee     Anaerobic Culture, Culture Res No Anaerobes isolated.    CULTURE TISSUE W/ GRM STAIN [[id number]] Collected:  08/21/18 1458    Order Status:  Completed Specimen:  Tissue Updated:  08/26/18 1233     Gram Stain Result No organisms seen.     Significant Indicator NEG     Source TISS     Site Left Knee #3     Tissue Culture No growth at 72 hours.    ANAEROBIC CULTURE [[id number]] Collected:  08/21/18 1458    Order Status:  Completed Specimen:  Tissue Updated:  08/26/18 1233     Significant Indicator NEG     Source TISS     Site Left Knee #3     Anaerobic Culture, Culture Res No Anaerobes isolated.    CULTURE TISSUE W/ GRM STAIN [[id number]] Collected:  08/21/18 1457    Order Status:  Completed Specimen:  Tissue Updated:  08/26/18 1232     Gram Stain Result No organisms seen.     Significant Indicator NEG     Source TISS     Site Left Knee #2     Tissue Culture No growth at 72 hours.    ANAEROBIC CULTURE [[id number]] Collected:  08/21/18 1457    Order Status:  Completed Specimen:  Tissue Updated:  08/26/18 1232     Significant Indicator NEG     Source TISS     Site Left Knee #2     Anaerobic Culture, Culture Res No Anaerobes isolated.    ANAEROBIC CULTURE [[id number]] Collected:  08/21/18 1456    Order Status:  Completed Specimen:  Tissue Updated:  08/26/18 1232     Significant Indicator NEG     Source TISS     Site Left Knee Tissue     Anaerobic Culture, Culture Res No Anaerobes isolated.    CULTURE TISSUE W/ GRM STAIN [[id number]] Collected:  08/21/18 1456    Order Status:  Completed Specimen:  Tissue Updated:  08/26/18 1232     Gram Stain Result No organisms seen.     Significant Indicator NEG     Source TISS     Site Left Knee Tissue     Tissue Culture No growth at 72 hours.    GRAM STAIN [[id number]] Collected:  08/21/18 1459    Order Status:  Completed Specimen:  Tissue Updated:  08/22/18 0827     Significant  Indicator .     Source TISS     Site Left Knee     Gram Stain Result No organisms seen.    GRAM STAIN [[id number]] Collected:  08/21/18 1458    Order Status:  Completed Specimen:  Tissue Updated:  08/22/18 0827     Significant Indicator .     Source TISS     Site Left Knee #3     Gram Stain Result No organisms seen.    GRAM STAIN [[id number]] Collected:  08/21/18 1457    Order Status:  Completed Specimen:  Tissue Updated:  08/22/18 0826     Significant Indicator .     Source TISS     Site Left Knee #2     Gram Stain Result No organisms seen.    GRAM STAIN [[id number]] Collected:  08/21/18 1456    Order Status:  Completed Specimen:  Tissue Updated:  08/22/18 0822     Significant Indicator .     Source TISS     Site Left Knee Tissue     Gram Stain Result No organisms seen.          Assessment:  Infected total left knee arthroplasty  Meth abuse      Plan:  Infected total left knee arthroplasty  Afebrile  No current leukocytosis  States had 6 surgeries in [place] but denies infection  Cultures negative  Evidence of chronic infection was seen during surgery with purulence and chronic synovitis.   Continue vancomycin and ceftriaxone  Monitor renal function and vanco trough while on vanco  Vanco trough 22.2 today  Anticipate 6 weeks post-op  Stop date 10/1/2018    History of methamphetamine use  Not a candidate for outpatient infusion or treatment  HIV and Hep c neg    Plan for SNF

## 2018-08-27 NOTE — DISCHARGE PLANNING
Agency/Facility Name: [facility]  Spoke To: Angie  Outcome: declined, not contracted with [facility]

## 2018-08-27 NOTE — CARE PLAN
Problem: Pain Management  Goal: Pain level will decrease to patient's comfort goal    Intervention: Follow pain managment plan developed in collaboration with patient and Interdisciplinary Team  Patient has oxycodone available for pain. Patient will occasionally accept this pharmacologic intervention for pain, but frequently would rather use rest/distraction to help with the discomfort.       Problem: Mobility  Goal: Risk for activity intolerance will decrease    Intervention: Encourage patient to increase activity level in collaboration with Interdisciplinary Team  Patient ambulates to restroom during night shift, and during day patient agrees to ambulate more frequently. Patient wants to rest more during the nights.

## 2018-08-27 NOTE — THERAPY
"Pt w/improved funcitonal mobility, though impaired cognition and poor command following. Pt cont to demonstrate WBAT on LLE, despite education. Pt demonstrated slow ashley and WB through forefoot, requiring verbal cuing. Pt  to cont to mobilize w/nursing staff and assist from nursing staff to reinforce TTWB. Pt is mobilizing well w/nursing staff at this time. Will cont to follow for d/c planning. Will discuss w/primary PT.    Physical Therapy Treatment completed.   Bed Mobility:  Supine to Sit: Modified Independent  Transfers: Sit to Stand: Supervised  Gait: Level Of Assist: Supervised with Front-Wheel Walker       Plan of Care: Will benefit from Physical Therapy 2 times per week    See \"Rehab Therapy-Acute\" Patient Summary Report for complete documentation.       "

## 2018-08-27 NOTE — DISCHARGE PLANNING
Agency/Facility Name: [facility]  Spoke To: Johanne  Outcome: Declined, no [facility] beds    Agency/Facility Name: [facility]  Spoke To: Pat  Outcome: No [facility] Beds    [facility] Name: [facility]  Spoke To: Shiela  Outcome: No [facility]

## 2018-08-28 LAB
ANION GAP SERPL CALC-SCNC: 12 MMOL/L (ref 0–11.9)
BUN SERPL-MCNC: 18 MG/DL (ref 8–22)
CALCIUM SERPL-MCNC: 9 MG/DL (ref 8.5–10.5)
CHLORIDE SERPL-SCNC: 101 MMOL/L (ref 96–112)
CO2 SERPL-SCNC: 24 MMOL/L (ref 20–33)
CREAT SERPL-MCNC: 0.9 MG/DL (ref 0.5–1.4)
GLUCOSE SERPL-MCNC: 168 MG/DL (ref 65–99)
POTASSIUM SERPL-SCNC: 4.7 MMOL/L (ref 3.6–5.5)
SODIUM SERPL-SCNC: 137 MMOL/L (ref 135–145)
VANCOMYCIN TROUGH SERPL-MCNC: 13.7 UG/ML (ref 10–20)

## 2018-08-28 PROCEDURE — 99233 SBSQ HOSP IP/OBS HIGH 50: CPT | Performed by: INTERNAL MEDICINE

## 2018-08-28 PROCEDURE — A9270 NON-COVERED ITEM OR SERVICE: HCPCS | Performed by: STUDENT IN AN ORGANIZED HEALTH CARE EDUCATION/TRAINING PROGRAM

## 2018-08-28 PROCEDURE — A9270 NON-COVERED ITEM OR SERVICE: HCPCS | Performed by: PHYSICIAN ASSISTANT

## 2018-08-28 PROCEDURE — 700105 HCHG RX REV CODE 258: Performed by: INTERNAL MEDICINE

## 2018-08-28 PROCEDURE — 80202 ASSAY OF VANCOMYCIN: CPT

## 2018-08-28 PROCEDURE — 700102 HCHG RX REV CODE 250 W/ 637 OVERRIDE(OP): Performed by: PHYSICIAN ASSISTANT

## 2018-08-28 PROCEDURE — 770001 HCHG ROOM/CARE - MED/SURG/GYN PRIV*

## 2018-08-28 PROCEDURE — 700102 HCHG RX REV CODE 250 W/ 637 OVERRIDE(OP): Performed by: STUDENT IN AN ORGANIZED HEALTH CARE EDUCATION/TRAINING PROGRAM

## 2018-08-28 PROCEDURE — 700112 HCHG RX REV CODE 229: Performed by: STUDENT IN AN ORGANIZED HEALTH CARE EDUCATION/TRAINING PROGRAM

## 2018-08-28 PROCEDURE — 700111 HCHG RX REV CODE 636 W/ 250 OVERRIDE (IP): Performed by: INTERNAL MEDICINE

## 2018-08-28 PROCEDURE — 80048 BASIC METABOLIC PNL TOTAL CA: CPT

## 2018-08-28 RX ORDER — HYDROMORPHONE HYDROCHLORIDE 2 MG/ML
0.5 INJECTION, SOLUTION INTRAMUSCULAR; INTRAVENOUS; SUBCUTANEOUS
Status: DISCONTINUED | OUTPATIENT
Start: 2018-08-28 | End: 2018-08-30 | Stop reason: HOSPADM

## 2018-08-28 RX ADMIN — TAMSULOSIN HYDROCHLORIDE 0.4 MG: 0.4 CAPSULE ORAL at 09:19

## 2018-08-28 RX ADMIN — VANCOMYCIN HYDROCHLORIDE 1100 MG: 100 INJECTION, POWDER, LYOPHILIZED, FOR SOLUTION INTRAVENOUS at 03:05

## 2018-08-28 RX ADMIN — DOCUSATE SODIUM 100 MG: 100 CAPSULE, LIQUID FILLED ORAL at 05:24

## 2018-08-28 RX ADMIN — DIPHENHYDRAMINE HCL 25 MG: 25 TABLET ORAL at 09:42

## 2018-08-28 RX ADMIN — VANCOMYCIN HYDROCHLORIDE 1100 MG: 100 INJECTION, POWDER, LYOPHILIZED, FOR SOLUTION INTRAVENOUS at 14:37

## 2018-08-28 RX ADMIN — OXYCODONE HYDROCHLORIDE 10 MG: 10 TABLET ORAL at 20:55

## 2018-08-28 RX ADMIN — ALBUTEROL SULFATE 2 PUFF: 90 AEROSOL, METERED RESPIRATORY (INHALATION) at 09:19

## 2018-08-28 RX ADMIN — OXYCODONE HYDROCHLORIDE 10 MG: 10 TABLET ORAL at 16:56

## 2018-08-28 RX ADMIN — ASPIRIN 81 MG: 81 TABLET, DELAYED RELEASE ORAL at 05:24

## 2018-08-28 RX ADMIN — ASPIRIN 81 MG: 81 TABLET, DELAYED RELEASE ORAL at 16:56

## 2018-08-28 RX ADMIN — DOCUSATE SODIUM 100 MG: 100 CAPSULE, LIQUID FILLED ORAL at 16:56

## 2018-08-28 RX ADMIN — NICOTINE 7 MG: 7 PATCH, EXTENDED RELEASE TRANSDERMAL at 05:24

## 2018-08-28 RX ADMIN — CEFTRIAXONE SODIUM 2 G: 2 INJECTION, POWDER, FOR SOLUTION INTRAMUSCULAR; INTRAVENOUS at 05:24

## 2018-08-28 RX ADMIN — OXYCODONE HYDROCHLORIDE 5 MG: 5 TABLET ORAL at 09:26

## 2018-08-28 ASSESSMENT — ENCOUNTER SYMPTOMS
NAUSEA: 0
VOMITING: 0
COUGH: 0
DIARRHEA: 0
SHORTNESS OF BREATH: 0
CHILLS: 0
ABDOMINAL PAIN: 0
FEVER: 0

## 2018-08-28 ASSESSMENT — PAIN SCALES - GENERAL
PAINLEVEL_OUTOF10: 8
PAINLEVEL_OUTOF10: 5
PAINLEVEL_OUTOF10: 7
PAINLEVEL_OUTOF10: 6

## 2018-08-28 NOTE — PROGRESS NOTES
Pharmacy Kinetics Addendum:    58 y.o. male on vancomycin day # 8 8/28/2018    Currently on Vancomycin 1100 mg iv q12hr    Indication for Treatment: Infected total knee arthroplasty    Recent Labs      08/28/18   1430   BUN  18   CREATININE  0.90     Recent Labs      08/27/18   0940  08/28/18   1430   VANCOTROUGH  22.2*  13.7       A/P   1. Vancomycin dose change: No change, continue vancomycin 1100 mg IV q12 hrs   2. Next vancomycin level: ~ 3 days  3. Goal trough: 12-16 mcg/mL  4. Comments: Renal labs remain WNL.  Vancomycin trough = 13.7 mcg/mL, therapeutic in goal range. Timing of preceding dose and level draw time were appropriate.  No changes needed at this time.  Anticipate next level check in ~ 3 days.  Pharmacy will continue to monitor.    Davidson Hernandez, PharmD

## 2018-08-28 NOTE — PROGRESS NOTES
Infectious Disease Progress Note    Author: Suri Pitt M.D. Date & Time of service: 2018  12:43 PM    Chief Complaint:  Infected total left knee arthroplasty      Interval History:  58-year-old male admitted for explantation of infected total knee arthroplasty   AF feels OK-declined most of exam   AF sleeping NAD   AF sleeping again today-no acute events Has PICC   AF no CBC, eating lunch without any new complaints, tolerating antibiotics without any issues, amenable to SNF placement   AF patient states he developed a diffuse rash on his bilateral upper extremities and right leg associated with pruritus but no erythema  Labs Reviewed, Medications Reviewed, Radiology Reviewed and Wound Reviewed.    Review of Systems:  Review of Systems   Constitutional: Negative for chills and fever.   Respiratory: Negative for cough and shortness of breath.    Gastrointestinal: Negative for abdominal pain, diarrhea, nausea and vomiting.   Musculoskeletal: Positive for joint pain.   Skin: Positive for itching and rash.       Hemodynamics:  Temp (24hrs), Av.6 °C (97.8 °F), Min:36.1 °C (97 °F), Max:37.2 °C (98.9 °F)  Temperature: 36.1 °C (97 °F)  Pulse  Av.8  Min: 60  Max: 101   Blood Pressure: 110/67       Physical Exam:  Physical Exam   Constitutional: He is oriented to person, place, and time. He appears well-developed. No distress.   Thin  Looks older than stated age   HENT:   Head: Normocephalic and atraumatic.   poor dentition   Eyes: Pupils are equal, round, and reactive to light. EOM are normal.   Neck: Neck supple.   Cardiovascular: Normal rate.    Pulmonary/Chest: Effort normal. No respiratory distress.   Abdominal: Soft. There is no tenderness.   Musculoskeletal:   Left lower extremity dressed  PICC   Neurological: He is alert and oriented to person, place, and time.   Skin: Skin is warm. Rash noted.   Papular rash on bilateral upper extremities chest and right lower extremity but no  associated erythema   Nursing note and vitals reviewed.      Meds:    Current Facility-Administered Medications:   •  MD ALERT... vancomycin  •  cefTRIAXone (ROCEPHIN) IV  •  vancomycin  •  nicotine  •  nicotine  •  lidocaine  •  albuterol  •  Pharmacy Consult Request  •  ondansetron  •  dexamethasone  •  diphenhydrAMINE  •  haloperidol lactate  •  scopolamine  •  docusate sodium  •  senna-docusate  •  senna-docusate  •  polyethylene glycol/lytes  •  magnesium hydroxide  •  bisacodyl  •  fleet  •  Respiratory Care per Protocol  •  D5LR  •  aspirin EC  •  oxyCODONE immediate-release  •  oxyCODONE immediate release  •  HYDROmorphone  •  zolpidem  •  tramadol  •  diphenhydrAMINE **OR** diphenhydrAMINE  •  chlorproMAZINE **OR** chlorproMAZINE  •  benzocaine-menthol  •  tamsulosin    Labs:  No results for input(s): WBC, RBC, HEMOGLOBIN, HEMATOCRIT, MCV, MCH, RDW, PLATELETCT, MPV, NEUTSPOLYS, LYMPHOCYTES, MONOCYTES, EOSINOPHILS, BASOPHILS, RBCMORPHOLO in the last 72 hours.  No results for input(s): SODIUM, POTASSIUM, CHLORIDE, CO2, GLUCOSE, BUN, CPKTOTAL in the last 72 hours.  No results for input(s): ALBUMIN, TBILIRUBIN, ALKPHOSPHAT, TOTPROTEIN, ALTSGPT, ASTSGOT, CREATININE in the last 72 hours.    Imaging:  Dx-knee 2- Left    Result Date: 8/21/2018 8/21/2018 4:45 PM HISTORY/REASON FOR EXAM:  Revision of left knee arthroplasty TECHNIQUE/EXAM DESCRIPTION AND NUMBER OF VIEWS:  2 views of the LEFT knee. COMPARISON: 07/24/2018 FINDINGS: Bone density is normal.  There is no evidence of fracture or dislocation.  Postoperative changes are noted with revision of left knee arthroplasty. Screws and opaque cement are identified in the proximal tibia as well as the distal femur. No acute fracture is identified.     1.  Status post revision of left knee arthroplasty. 2.  No acute fracture or malalignment appreciated.      Micro:  Results     Procedure Component Value Units Date/Time    CULTURE TISSUE W/ GRM STAIN [[id number]]  Collected:  08/21/18 1459    Order Status:  Completed Specimen:  Tissue Updated:  08/26/18 1233     Gram Stain Result No organisms seen.     Significant Indicator NEG     Source TISS     Site Left Knee     Tissue Culture No growth at 72 hours.    ANAEROBIC CULTURE [[id number]] Collected:  08/21/18 1459    Order Status:  Completed Specimen:  Tissue Updated:  08/26/18 1233     Significant Indicator NEG     Source TISS     Site Left Knee     Anaerobic Culture, Culture Res No Anaerobes isolated.    CULTURE TISSUE W/ GRM STAIN [[id number]] Collected:  08/21/18 1458    Order Status:  Completed Specimen:  Tissue Updated:  08/26/18 1233     Gram Stain Result No organisms seen.     Significant Indicator NEG     Source TISS     Site Left Knee #3     Tissue Culture No growth at 72 hours.    ANAEROBIC CULTURE [[id number]] Collected:  08/21/18 1458    Order Status:  Completed Specimen:  Tissue Updated:  08/26/18 1233     Significant Indicator NEG     Source TISS     Site Left Knee #3     Anaerobic Culture, Culture Res No Anaerobes isolated.    CULTURE TISSUE W/ GRM STAIN [[id number]] Collected:  08/21/18 1457    Order Status:  Completed Specimen:  Tissue Updated:  08/26/18 1232     Gram Stain Result No organisms seen.     Significant Indicator NEG     Source TISS     Site Left Knee #2     Tissue Culture No growth at 72 hours.    ANAEROBIC CULTURE [[id number]] Collected:  08/21/18 1457    Order Status:  Completed Specimen:  Tissue Updated:  08/26/18 1232     Significant Indicator NEG     Source TISS     Site Left Knee #2     Anaerobic Culture, Culture Res No Anaerobes isolated.    ANAEROBIC CULTURE [[id number]] Collected:  08/21/18 1456    Order Status:  Completed Specimen:  Tissue Updated:  08/26/18 1232     Significant Indicator NEG     Source TISS     Site Left Knee Tissue     Anaerobic Culture, Culture Res No Anaerobes isolated.    CULTURE TISSUE W/ GRM STAIN [[id number]] Collected:  08/21/18 1456    Order Status:  Completed  Specimen:  Tissue Updated:  08/26/18 1232     Gram Stain Result No organisms seen.     Significant Indicator NEG     Source TISS     Site Left Knee Tissue     Tissue Culture No growth at 72 hours.    GRAM STAIN [[id number]] Collected:  08/21/18 1459    Order Status:  Completed Specimen:  Tissue Updated:  08/22/18 0827     Significant Indicator .     Source TISS     Site Left Knee     Gram Stain Result No organisms seen.    GRAM STAIN [[id number]] Collected:  08/21/18 1458    Order Status:  Completed Specimen:  Tissue Updated:  08/22/18 0827     Significant Indicator .     Source TISS     Site Left Knee #3     Gram Stain Result No organisms seen.    GRAM STAIN [[id number]] Collected:  08/21/18 1457    Order Status:  Completed Specimen:  Tissue Updated:  08/22/18 0826     Significant Indicator .     Source TISS     Site Left Knee #2     Gram Stain Result No organisms seen.    GRAM STAIN [[id number]] Collected:  08/21/18 1456    Order Status:  Completed Specimen:  Tissue Updated:  08/22/18 0822     Significant Indicator .     Source TISS     Site Left Knee Tissue     Gram Stain Result No organisms seen.          Assessment:  Infected total left knee arthroplasty  Meth abuse      Plan:  Infected total left knee arthroplasty  Afebrile  No current leukocytosis  States had 6 surgeries in [place] but denies infection  Cultures negative  Evidence of chronic infection was seen during surgery with purulence and chronic synovitis.   Continue vancomycin and ceftriaxone  Monitor renal function and vanco trough while on vanco  Vanco trough 22.2 today  Anticipate 6 weeks post-op  Stop date 10/1/2018  Check CBC and BMP tomorrow morning    Rash, new  ?  Secondary to antibiotics versus other  No evidence of erythema  Continue to monitor  If worsens, may need to change antibiotics  Symptomatic treatment    History of methamphetamine use  Not a candidate for outpatient infusion or treatment  HIV and Hep c neg    Plan for SNF

## 2018-08-28 NOTE — PROGRESS NOTES
"Pharmacy Kinetics 58 y.o. male on vancomycin day # 8 2018    Currently on Vancomycin 1100 mg IV q12hr (0200, 1400)    Indication for Treatment: Infected total knee arthroplasty     Pertinent history per medical record: Admitted on 2018 for Admitted on 2018 for infected left TKA .  He is s/p resection of his arthroplasty on .  Evidence of chronic infection was seen during surgery with purulence and chronic synovitis.  ID is consulting.  Cefazolin changed to ceftriaxone per ID on 18. IV abx planned to continue x6 weeks with stop date of 10/1/18 per ID.     Other antibiotics: Ceftriaxone 2g IV q24 hrs     Allergies: Patient has no known allergies.      List concerns for renal function: none     Pertinent cultures to date:   18 tissue, left knee: NGTD  18 tissue, left knee #2: NGTD  18 tissue, left knee #3: NGTD      No results for input(s): WBC, NEUTSPOLYS, BANDSSTABS in the last 72 hours.  No results for input(s): BUN, CREATININE, ALBUMIN in the last 72 hours.  Recent Labs      18   0940   VANCOTROUGH  22.2*     Intake/Output Summary (Last 24 hours) at 18 0735  Last data filed at 18 0400   Gross per 24 hour   Intake              840 ml   Output             1050 ml   Net             -210 ml      Blood pressure 118/73, pulse 92, temperature 36.8 °C (98.3 °F), resp. rate 18, height 1.829 m (6' 0.01\"), weight 77.1 kg (169 lb 15.6 oz), SpO2 93 %. Temp (24hrs), Av.8 °C (98.2 °F), Min:36.2 °C (97.1 °F), Max:37.2 °C (98.9 °F)      A/P   1. Vancomycin dose change: No change, continue vancomycin 1100 mg IV q12 hrs   2. Next vancomycin level: trough at 1430   3. Goal trough: 12-16 mcg/mL  4. Comments:  No clinical changes overnight.  Vancomycin dose this AM hung 1 hr later than expected, admin times adjusted to 0300, 1500. Dosing was reduced yesterday for supratherapeutic trough of 22.2 on .  Timing of the dose preceding the level may have contributed to " elevated result.  Trough will be rechecked today, 8/28, at 1430 along with BMP.  I/O information incomplete.  Pharmacy will continue to monitor and further dose adjust dosing if needed based on results.    Davidson CodyD

## 2018-08-28 NOTE — PROGRESS NOTES
" Subjective:      No events or new concerns.  Awaiting SNF bed.    Objective:  Blood pressure 118/73, pulse 92, temperature 36.8 °C (98.3 °F), resp. rate 18, height 1.829 m (6' 0.01\"), weight 77.1 kg (169 lb 15.6 oz), SpO2 93 %.                Intake/Output Summary (Last 24 hours) at 08/28/18 0717  Last data filed at 08/28/18 0400   Gross per 24 hour   Intake              840 ml   Output             1050 ml   Net             -210 ml       Comfortable, no distress  Neurologically and vascularly intact with palpable pedal pulses bilaterally.  Dressing C/D/I      Assessment:    Doing well s/p resection total knee arthroplasty.    Plan:       Abx: Vancomycin and Ceftriaxone.  PICC in place  TTWB in immobilizer  OT/PT  DVT ppx - Lovenox + Sequential Compression Devices  DC planning - can d/c whenever SNF available    "

## 2018-08-28 NOTE — CARE PLAN
Problem: Safety  Goal: Will remain free from falls  Outcome: PROGRESSING AS EXPECTED  No fall has occurred. Pt is SBA with a FWW. Pt has a steady gait.     Problem: Infection  Goal: Will remain free from infection  Outcome: PROGRESSING AS EXPECTED  No s/s of infection besides the one he came in with. Before and after entering the room hands are washed. His PICC dressing was falling off so it was changed. Prior to IV access the hub is scrubbed for 15 secs and allowed to dry.

## 2018-08-28 NOTE — DISCHARGE PLANNING
Agency/Facility Name: [facility]  Spoke To: Dave  Outcome: Declined, cost of meds and care exceed

## 2018-08-29 LAB
ANION GAP SERPL CALC-SCNC: 9 MMOL/L (ref 0–11.9)
BUN SERPL-MCNC: 20 MG/DL (ref 8–22)
CALCIUM SERPL-MCNC: 8.8 MG/DL (ref 8.5–10.5)
CHLORIDE SERPL-SCNC: 102 MMOL/L (ref 96–112)
CO2 SERPL-SCNC: 24 MMOL/L (ref 20–33)
CREAT SERPL-MCNC: 0.96 MG/DL (ref 0.5–1.4)
ERYTHROCYTE [DISTWIDTH] IN BLOOD BY AUTOMATED COUNT: 44 FL (ref 35.9–50)
GLUCOSE SERPL-MCNC: 135 MG/DL (ref 65–99)
HCT VFR BLD AUTO: 37.3 % (ref 42–52)
HGB BLD-MCNC: 12 G/DL (ref 14–18)
MCH RBC QN AUTO: 28 PG (ref 27–33)
MCHC RBC AUTO-ENTMCNC: 32.2 G/DL (ref 33.7–35.3)
MCV RBC AUTO: 86.9 FL (ref 81.4–97.8)
PLATELET # BLD AUTO: 251 K/UL (ref 164–446)
PMV BLD AUTO: 8.8 FL (ref 9–12.9)
POTASSIUM SERPL-SCNC: 4.7 MMOL/L (ref 3.6–5.5)
RBC # BLD AUTO: 4.29 M/UL (ref 4.7–6.1)
SODIUM SERPL-SCNC: 135 MMOL/L (ref 135–145)
WBC # BLD AUTO: 8.1 K/UL (ref 4.8–10.8)

## 2018-08-29 PROCEDURE — 700102 HCHG RX REV CODE 250 W/ 637 OVERRIDE(OP): Performed by: PHYSICIAN ASSISTANT

## 2018-08-29 PROCEDURE — 99232 SBSQ HOSP IP/OBS MODERATE 35: CPT | Performed by: INTERNAL MEDICINE

## 2018-08-29 PROCEDURE — 80048 BASIC METABOLIC PNL TOTAL CA: CPT

## 2018-08-29 PROCEDURE — A9270 NON-COVERED ITEM OR SERVICE: HCPCS | Performed by: STUDENT IN AN ORGANIZED HEALTH CARE EDUCATION/TRAINING PROGRAM

## 2018-08-29 PROCEDURE — 700102 HCHG RX REV CODE 250 W/ 637 OVERRIDE(OP): Performed by: STUDENT IN AN ORGANIZED HEALTH CARE EDUCATION/TRAINING PROGRAM

## 2018-08-29 PROCEDURE — 700111 HCHG RX REV CODE 636 W/ 250 OVERRIDE (IP): Performed by: INTERNAL MEDICINE

## 2018-08-29 PROCEDURE — G8989 SELF CARE D/C STATUS: HCPCS | Mod: CI

## 2018-08-29 PROCEDURE — G8988 SELF CARE GOAL STATUS: HCPCS | Mod: CI

## 2018-08-29 PROCEDURE — 97530 THERAPEUTIC ACTIVITIES: CPT

## 2018-08-29 PROCEDURE — 700112 HCHG RX REV CODE 229: Performed by: STUDENT IN AN ORGANIZED HEALTH CARE EDUCATION/TRAINING PROGRAM

## 2018-08-29 PROCEDURE — 97535 SELF CARE MNGMENT TRAINING: CPT

## 2018-08-29 PROCEDURE — 700105 HCHG RX REV CODE 258: Performed by: INTERNAL MEDICINE

## 2018-08-29 PROCEDURE — 770001 HCHG ROOM/CARE - MED/SURG/GYN PRIV*

## 2018-08-29 PROCEDURE — A9270 NON-COVERED ITEM OR SERVICE: HCPCS | Performed by: INTERNAL MEDICINE

## 2018-08-29 PROCEDURE — A9270 NON-COVERED ITEM OR SERVICE: HCPCS | Performed by: PHYSICIAN ASSISTANT

## 2018-08-29 PROCEDURE — 85027 COMPLETE CBC AUTOMATED: CPT

## 2018-08-29 PROCEDURE — 700102 HCHG RX REV CODE 250 W/ 637 OVERRIDE(OP): Performed by: INTERNAL MEDICINE

## 2018-08-29 RX ORDER — TRIAMCINOLONE ACETONIDE 1 MG/G
CREAM TOPICAL 2 TIMES DAILY
Status: DISCONTINUED | OUTPATIENT
Start: 2018-08-29 | End: 2018-08-30 | Stop reason: HOSPADM

## 2018-08-29 RX ADMIN — VANCOMYCIN HYDROCHLORIDE 1100 MG: 100 INJECTION, POWDER, LYOPHILIZED, FOR SOLUTION INTRAVENOUS at 02:15

## 2018-08-29 RX ADMIN — TRIAMCINOLONE ACETONIDE: 1 CREAM TOPICAL at 18:00

## 2018-08-29 RX ADMIN — VANCOMYCIN HYDROCHLORIDE 1100 MG: 100 INJECTION, POWDER, LYOPHILIZED, FOR SOLUTION INTRAVENOUS at 16:29

## 2018-08-29 RX ADMIN — DOCUSATE SODIUM 100 MG: 100 CAPSULE, LIQUID FILLED ORAL at 16:29

## 2018-08-29 RX ADMIN — TRIAMCINOLONE ACETONIDE: 1 CREAM TOPICAL at 11:45

## 2018-08-29 RX ADMIN — CEFTRIAXONE SODIUM 2 G: 2 INJECTION, POWDER, FOR SOLUTION INTRAMUSCULAR; INTRAVENOUS at 05:36

## 2018-08-29 RX ADMIN — OXYCODONE HYDROCHLORIDE 10 MG: 10 TABLET ORAL at 20:35

## 2018-08-29 RX ADMIN — DOCUSATE SODIUM 100 MG: 100 CAPSULE, LIQUID FILLED ORAL at 05:38

## 2018-08-29 RX ADMIN — ASPIRIN 81 MG: 81 TABLET, DELAYED RELEASE ORAL at 05:38

## 2018-08-29 RX ADMIN — NICOTINE 7 MG: 7 PATCH, EXTENDED RELEASE TRANSDERMAL at 05:38

## 2018-08-29 RX ADMIN — OXYCODONE HYDROCHLORIDE 10 MG: 10 TABLET ORAL at 05:39

## 2018-08-29 RX ADMIN — ASPIRIN 81 MG: 81 TABLET, DELAYED RELEASE ORAL at 16:28

## 2018-08-29 ASSESSMENT — PAIN SCALES - GENERAL
PAINLEVEL_OUTOF10: ASSUMED PAIN PRESENT
PAINLEVEL_OUTOF10: 9
PAINLEVEL_OUTOF10: 9
PAINLEVEL_OUTOF10: 10
PAINLEVEL_OUTOF10: 8
PAINLEVEL_OUTOF10: ASSUMED PAIN PRESENT
PAINLEVEL_OUTOF10: ASSUMED PAIN PRESENT

## 2018-08-29 ASSESSMENT — COGNITIVE AND FUNCTIONAL STATUS - GENERAL
SUGGESTED CMS G CODE MODIFIER DAILY ACTIVITY: CI
HELP NEEDED FOR BATHING: A LITTLE
DAILY ACTIVITIY SCORE: 23

## 2018-08-29 ASSESSMENT — ENCOUNTER SYMPTOMS
NAUSEA: 0
COUGH: 0
DIARRHEA: 0
CHILLS: 0
SHORTNESS OF BREATH: 0
ABDOMINAL PAIN: 0
FEVER: 0
VOMITING: 0

## 2018-08-29 NOTE — PROGRESS NOTES
" Subjective:      LE rash.  No other events.  SNF pending.    Objective:  Blood pressure 103/64, pulse 82, temperature 36.5 °C (97.7 °F), resp. rate 18, height 1.829 m (6' 0.01\"), weight 77.1 kg (169 lb 15.6 oz), SpO2 95 %.    Recent Labs      08/29/18   0220   WBC  8.1   RBC  4.29*   HEMOGLOBIN  12.0*   HEMATOCRIT  37.3*   MCV  86.9   MCH  28.0   MCHC  32.2*   RDW  44.0   PLATELETCT  251   MPV  8.8*     Recent Labs      08/28/18   1430  08/29/18   0220   SODIUM  137  135   POTASSIUM  4.7  4.7   CHLORIDE  101  102   CO2  24  24   GLUCOSE  168*  135*   BUN  18  20   CREATININE  0.90  0.96   CALCIUM  9.0  8.8         Intake/Output Summary (Last 24 hours) at 08/29/18 0739  Last data filed at 08/29/18 0544   Gross per 24 hour   Intake                0 ml   Output             1350 ml   Net            -1350 ml       Comfortable, no distress  Neurologically and vascularly intact with palpable pedal pulses bilaterally.  Dressing C/D/I      Assessment:    Doing well s/p resection of infected total knee arthroplasty.  Cultures NGTD    Plan:       Abx: Per ID.  PICC in place  TTWB in immobilizer  OT/PT  DVT ppx - Lovenox + Sequential Compression Devices  DC planning - can d/c whenever SNF available       "

## 2018-08-29 NOTE — CARE PLAN
Problem: Knowledge Deficit  Goal: Knowledge of disease process/condition, treatment plan, diagnostic tests, and medications will improve  Outcome: PROGRESSING AS EXPECTED  Reviewed POC including safety, mobility, pain management, medication administration, DVT prophylaxis, and discharge.     Problem: Mobility  Goal: Risk for activity intolerance will decrease  Outcome: PROGRESSING AS EXPECTED  Pt ambulating in hallway. Pt standby assist, TTWB, with a front wheel walker. Pt needs encouragement for TTWB precautions.

## 2018-08-29 NOTE — PROGRESS NOTES
Pt c/o pain in knee today, requiring prn medication twice. Friend at bedside for the day. Calling appropriately.

## 2018-08-29 NOTE — PROGRESS NOTES
"Pharmacy Kinetics 58 y.o. male on vancomycin day # 9 2018    Currently on Vancomycin 1100 mg IV q12hr (0300, 1500)    Indication for Treatment: Infected total knee arthroplasty     Pertinent history per medical record: Admitted on 2018 for Admitted on 2018 for infected left TKA .  He is s/p resection of his arthroplasty on .  Evidence of chronic infection was seen during surgery with purulence and chronic synovitis.  ID is consulting.  Cefazolin changed to ceftriaxone per ID on 18. IV abx planned to continue x6 weeks with stop date of 10/1/18 per ID.     Other antibiotics: Ceftriaxone 2g IV q24 hrs     Allergies: Patient has no known allergies.      List concerns for renal function: none     Pertinent cultures to date:   18 tissue, left knee: NGTD  18 tissue, left knee #2: NGTD  18 tissue, left knee #3: NGTD      Recent Labs      18   0220   WBC  8.1     Recent Labs      18   1430  18   0220   BUN  18  20   CREATININE  0.90  0.96     Recent Labs      18   0940  18   1430   VANCOTROUGH  22.2*  13.7     Intake/Output Summary (Last 24 hours) at 18 0733  Last data filed at 18 0544   Gross per 24 hour   Intake                0 ml   Output             1350 ml   Net            -1350 ml      Blood pressure 103/64, pulse 82, temperature 36.5 °C (97.7 °F), resp. rate 18, height 1.829 m (6' 0.01\"), weight 77.1 kg (169 lb 15.6 oz), SpO2 95 %. Temp (24hrs), Av.4 °C (97.6 °F), Min:36.1 °C (97 °F), Max:36.7 °C (98.1 °F)      A/P   1. Vancomycin dose change: No changes, Continue vancomycin 1100 mg IV q12 hrs  2. Next vancomycin level: ~2 days, not yet ordered  3. Goal trough: 12-16 mcg/mL  4. Comments: Renal labs WNL, will monitor slight upward trend in BUN, SCr.  Plan to recheck BMP with next vanco level in ~ 2 days.  No leukocytosis with AM labs.  Afebrile over interval.  No clinical changes overnight.  C/o knee pain, medicated x2 per notes.  " Continue current regimen for now.  Pharmacy will continue to follow.    Davidson CoydD

## 2018-08-29 NOTE — THERAPY
"Occupational Therapy Treatment completed with focus on ADLs, ADL transfers and patient education.  Functional Status: Mod I supine > < EOB, supv transfers with FWW, supv seated bathing, supv pants donning/doffing, min A sock donning (L foot)  Plan of Care: Patient with no further skilled OT needs in the acute care setting at this time  Discharge Recommendations:  Equipment Front-Wheel Walker and Shower Chair. Post-acute therapy: Discharge to home with outpatient or home health for additional skilled therapy services.    See \"Rehab Therapy-Acute\" Patient Summary Report for complete documentation.     Pt seen for OT tx to include: bed mobility, transfers and gait in room with FWW, full body undressing, seated bathing, full body dressing, BTB. Pt required assist with L sock donning due to inability to flex forward without WB through limb. Introduced sock aid, however pt unable to utilize without translating weight through LLE including knee. Pt will require incidental assist with L sock and shoe until out of knee immobilizer or advanced for WB. Recommend pt wear sock only on L foot until cleared for WBAT. Pt is able to complete all other basic ADL (including donning pants) and transfers with no more than supv. Pt has no further acute OT needs at this time.   "

## 2018-08-29 NOTE — CARE PLAN
Problem: Safety  Goal: Will remain free from injury  Outcome: PROGRESSING AS EXPECTED  Bed alarm on, pt calling appropriately. Friend at bedside. FWW in bathroom, non skid socks, rounding in place.    Problem: Pain Management  Goal: Pain level will decrease to patient's comfort goal  Outcome: PROGRESSING AS EXPECTED  C/o pain 4-6/10 today. Medication per MAR, which pt reports alleviated pain and allowed him to rest. Quiet environment, rest, distraction.    Problem: Fluid Volume:  Goal: Will maintain balanced intake and output  Outcome: PROGRESSING AS EXPECTED  Tolerating PO fluids with no issues, voiding clear yellow urine with no issues. Continue to provide fluids and toileting.

## 2018-08-29 NOTE — PROGRESS NOTES
Infectious Disease Progress Note    Author: Suri Pitt M.D. Date & Time of service: 2018  11:22 AM    Chief Complaint:  Infected total left knee arthroplasty    Interval History:  58-year-old male admitted for explantation of infected total knee arthroplasty   AF feels OK-declined most of exam   AF sleeping NAD   AF sleeping again today-no acute events Has PICC   AF no CBC, eating lunch without any new complaints, tolerating antibiotics without any issues, amenable to SNF placement   AF patient states he developed a diffuse rash on his bilateral upper extremities and right leg associated with pruritus but no erythema   AF WBC 8.1 continues to complain of diffuse rash and pruritus  Labs Reviewed, Medications Reviewed, Radiology Reviewed and Wound Reviewed.    Review of Systems:  Review of Systems   Constitutional: Negative for chills and fever.   Respiratory: Negative for cough and shortness of breath.    Gastrointestinal: Negative for abdominal pain, diarrhea, nausea and vomiting.   Musculoskeletal: Positive for joint pain.   Skin: Positive for itching and rash.       Hemodynamics:  Temp (24hrs), Av.4 °C (97.6 °F), Min:36.1 °C (97 °F), Max:36.7 °C (98.1 °F)  Temperature: 36.1 °C (97 °F)  Pulse  Av  Min: 60  Max: 101   Blood Pressure: 121/65       Physical Exam:  Physical Exam   Constitutional: He is oriented to person, place, and time. He appears well-developed. No distress.   Thin  Looks older than stated age   HENT:   Head: Normocephalic and atraumatic.   poor dentition   Eyes: Pupils are equal, round, and reactive to light. EOM are normal.   Neck: Neck supple.   Cardiovascular: Normal rate.    Pulmonary/Chest: Effort normal. No respiratory distress.   Abdominal: Soft. There is no tenderness.   Musculoskeletal:   Left lower extremity dressed  PICC   Neurological: He is alert and oriented to person, place, and time.   Skin: Skin is warm. Rash noted.   Papular rash on  bilateral upper extremities chest and right lower extremity but no associated erythema   Nursing note and vitals reviewed.      Meds:    Current Facility-Administered Medications:   •  HYDROmorphone  •  MD ALERT... vancomycin  •  cefTRIAXone (ROCEPHIN) IV  •  vancomycin  •  nicotine  •  lidocaine  •  albuterol  •  Pharmacy Consult Request  •  ondansetron  •  dexamethasone  •  diphenhydrAMINE  •  haloperidol lactate  •  scopolamine  •  docusate sodium  •  senna-docusate  •  senna-docusate  •  polyethylene glycol/lytes  •  magnesium hydroxide  •  bisacodyl  •  fleet  •  Respiratory Care per Protocol  •  D5LR  •  aspirin EC  •  oxyCODONE immediate-release  •  oxyCODONE immediate release  •  zolpidem  •  tramadol  •  diphenhydrAMINE **OR** diphenhydrAMINE  •  chlorproMAZINE **OR** chlorproMAZINE  •  benzocaine-menthol  •  tamsulosin    Labs:  Recent Labs      08/29/18   0220   WBC  8.1   RBC  4.29*   HEMOGLOBIN  12.0*   HEMATOCRIT  37.3*   MCV  86.9   MCH  28.0   RDW  44.0   PLATELETCT  251   MPV  8.8*     Recent Labs      08/28/18   1430  08/29/18   0220   SODIUM  137  135   POTASSIUM  4.7  4.7   CHLORIDE  101  102   CO2  24  24   GLUCOSE  168*  135*   BUN  18  20     Recent Labs      08/28/18   1430  08/29/18   0220   CREATININE  0.90  0.96       Imaging:  Dx-knee 2- Left    Result Date: 8/21/2018 8/21/2018 4:45 PM HISTORY/REASON FOR EXAM:  Revision of left knee arthroplasty TECHNIQUE/EXAM DESCRIPTION AND NUMBER OF VIEWS:  2 views of the LEFT knee. COMPARISON: 07/24/2018 FINDINGS: Bone density is normal.  There is no evidence of fracture or dislocation.  Postoperative changes are noted with revision of left knee arthroplasty. Screws and opaque cement are identified in the proximal tibia as well as the distal femur. No acute fracture is identified.     1.  Status post revision of left knee arthroplasty. 2.  No acute fracture or malalignment appreciated.      Micro:  Results     Procedure Component Value Units  Date/Time    CULTURE TISSUE W/ GRM STAIN [[id number]] Collected:  08/21/18 1459    Order Status:  Completed Specimen:  Tissue Updated:  08/26/18 1233     Gram Stain Result No organisms seen.     Significant Indicator NEG     Source TISS     Site Left Knee     Tissue Culture No growth at 72 hours.    ANAEROBIC CULTURE [[id number]] Collected:  08/21/18 1459    Order Status:  Completed Specimen:  Tissue Updated:  08/26/18 1233     Significant Indicator NEG     Source TISS     Site Left Knee     Anaerobic Culture, Culture Res No Anaerobes isolated.    CULTURE TISSUE W/ GRM STAIN [[id number]] Collected:  08/21/18 1458    Order Status:  Completed Specimen:  Tissue Updated:  08/26/18 1233     Gram Stain Result No organisms seen.     Significant Indicator NEG     Source TISS     Site Left Knee #3     Tissue Culture No growth at 72 hours.    ANAEROBIC CULTURE [[id number]] Collected:  08/21/18 1458    Order Status:  Completed Specimen:  Tissue Updated:  08/26/18 1233     Significant Indicator NEG     Source TISS     Site Left Knee #3     Anaerobic Culture, Culture Res No Anaerobes isolated.    CULTURE TISSUE W/ GRM STAIN [[id number]] Collected:  08/21/18 1457    Order Status:  Completed Specimen:  Tissue Updated:  08/26/18 1232     Gram Stain Result No organisms seen.     Significant Indicator NEG     Source TISS     Site Left Knee #2     Tissue Culture No growth at 72 hours.    ANAEROBIC CULTURE [[id number]] Collected:  08/21/18 1457    Order Status:  Completed Specimen:  Tissue Updated:  08/26/18 1232     Significant Indicator NEG     Source TISS     Site Left Knee #2     Anaerobic Culture, Culture Res No Anaerobes isolated.    ANAEROBIC CULTURE [[id number]] Collected:  08/21/18 1456    Order Status:  Completed Specimen:  Tissue Updated:  08/26/18 1232     Significant Indicator NEG     Source TISS     Site Left Knee Tissue     Anaerobic Culture, Culture Res No Anaerobes isolated.    CULTURE TISSUE W/ GRM STAIN [[id number]]  Collected:  08/21/18 1456    Order Status:  Completed Specimen:  Tissue Updated:  08/26/18 1232     Gram Stain Result No organisms seen.     Significant Indicator NEG     Source TISS     Site Left Knee Tissue     Tissue Culture No growth at 72 hours.          Assessment:  Infected total left knee arthroplasty  Meth abuse    Plan:  Infected total left knee arthroplasty  Afebrile  No leukocytosis  States had 6 surgeries in [place] but denies infection  Cultures negative  Evidence of chronic infection was seen during surgery with purulence and chronic synovitis.   Continue vancomycin and ceftriaxone  Monitor renal function and vanco trough while on vanco.  Renal function normal and stable  Vanco trough 13.7   Anticipate 6 weeks post-op  Stop date 10/1/2018    Diffuse rash  ?  Secondary to antibiotics versus other  No evidence of erythema  Continue to monitor  If worsens, may need to change antibiotics  Symptomatic treatment  Trial steroid cream -ordered    History of methamphetamine use  Not a candidate for outpatient infusion or treatment  HIV and Hep c neg    Plan for SNF

## 2018-08-30 VITALS
BODY MASS INDEX: 23.02 KG/M2 | RESPIRATION RATE: 18 BRPM | SYSTOLIC BLOOD PRESSURE: 111 MMHG | WEIGHT: 169.97 LBS | OXYGEN SATURATION: 94 % | DIASTOLIC BLOOD PRESSURE: 72 MMHG | HEART RATE: 82 BPM | HEIGHT: 72 IN | TEMPERATURE: 97.1 F

## 2018-08-30 PROCEDURE — A9270 NON-COVERED ITEM OR SERVICE: HCPCS | Performed by: STUDENT IN AN ORGANIZED HEALTH CARE EDUCATION/TRAINING PROGRAM

## 2018-08-30 PROCEDURE — A9270 NON-COVERED ITEM OR SERVICE: HCPCS | Performed by: PHYSICIAN ASSISTANT

## 2018-08-30 PROCEDURE — 700111 HCHG RX REV CODE 636 W/ 250 OVERRIDE (IP): Performed by: INTERNAL MEDICINE

## 2018-08-30 PROCEDURE — 99232 SBSQ HOSP IP/OBS MODERATE 35: CPT | Performed by: INTERNAL MEDICINE

## 2018-08-30 PROCEDURE — 700105 HCHG RX REV CODE 258: Performed by: INTERNAL MEDICINE

## 2018-08-30 PROCEDURE — 700112 HCHG RX REV CODE 229: Performed by: STUDENT IN AN ORGANIZED HEALTH CARE EDUCATION/TRAINING PROGRAM

## 2018-08-30 PROCEDURE — 700102 HCHG RX REV CODE 250 W/ 637 OVERRIDE(OP): Performed by: PHYSICIAN ASSISTANT

## 2018-08-30 PROCEDURE — 700102 HCHG RX REV CODE 250 W/ 637 OVERRIDE(OP): Performed by: STUDENT IN AN ORGANIZED HEALTH CARE EDUCATION/TRAINING PROGRAM

## 2018-08-30 RX ORDER — TRIAMCINOLONE ACETONIDE 1 MG/G
1 CREAM TOPICAL 2 TIMES DAILY
Qty: 1 TUBE | Refills: 0 | Status: SHIPPED | OUTPATIENT
Start: 2018-08-30 | End: 2018-11-02

## 2018-08-30 RX ADMIN — ASPIRIN 81 MG: 81 TABLET, DELAYED RELEASE ORAL at 05:58

## 2018-08-30 RX ADMIN — OXYCODONE HYDROCHLORIDE 10 MG: 10 TABLET ORAL at 05:58

## 2018-08-30 RX ADMIN — OXYCODONE HYDROCHLORIDE 10 MG: 10 TABLET ORAL at 13:01

## 2018-08-30 RX ADMIN — NICOTINE 7 MG: 7 PATCH, EXTENDED RELEASE TRANSDERMAL at 05:59

## 2018-08-30 RX ADMIN — TRIAMCINOLONE ACETONIDE: 1 CREAM TOPICAL at 06:02

## 2018-08-30 RX ADMIN — DOCUSATE SODIUM 100 MG: 100 CAPSULE, LIQUID FILLED ORAL at 05:58

## 2018-08-30 RX ADMIN — VANCOMYCIN HYDROCHLORIDE 1100 MG: 100 INJECTION, POWDER, LYOPHILIZED, FOR SOLUTION INTRAVENOUS at 03:30

## 2018-08-30 RX ADMIN — CEFTRIAXONE SODIUM 2 G: 2 INJECTION, POWDER, FOR SOLUTION INTRAMUSCULAR; INTRAVENOUS at 05:58

## 2018-08-30 RX ADMIN — OXYCODONE HYDROCHLORIDE 10 MG: 10 TABLET ORAL at 00:46

## 2018-08-30 ASSESSMENT — ENCOUNTER SYMPTOMS
VOMITING: 0
FEVER: 0
NAUSEA: 0
SHORTNESS OF BREATH: 0
COUGH: 0
CHILLS: 0
ABDOMINAL PAIN: 0
DIARRHEA: 0

## 2018-08-30 ASSESSMENT — PAIN SCALES - GENERAL
PAINLEVEL_OUTOF10: 9
PAINLEVEL_OUTOF10: ASSUMED PAIN PRESENT
PAINLEVEL_OUTOF10: 9
PAINLEVEL_OUTOF10: 9
PAINLEVEL_OUTOF10: 6

## 2018-08-30 NOTE — DISCHARGE PLANNING
Agency/Facility Name: [facility]  Spoke To: Gilma  Outcome: Transport has been set up for 1300. Job # [id number]  Gail at [facility] notified via voicemail and skype.

## 2018-08-30 NOTE — DISCHARGE PLANNING
Anticipated Discharge Disposition: To transfer to [facility]    Action: Transport time arranged for 1pm today.  Patient and girlfriend Scarlett aware of this, address and phone number given to them.     Barriers to Discharge:  none    Plan: [facility]

## 2018-08-30 NOTE — PROGRESS NOTES
Infectious Disease Progress Note    Author: Suri Pitt M.D. Date & Time of service: 2018  9:36 AM    Chief Complaint:  Infected total left knee arthroplasty    Interval History:  58-year-old male admitted for explantation of infected total knee arthroplasty   AF feels OK-declined most of exam   AF sleeping NAD   AF sleeping again today-no acute events Has PICC   AF no CBC, eating lunch without any new complaints, tolerating antibiotics without any issues, amenable to SNF placement   AF patient states he developed a diffuse rash on his bilateral upper extremities and right leg associated with pruritus but no erythema   AF WBC 8.1 continues to complain of diffuse rash and pruritus   AF no changes in rash, states he cannot tell yet if the steroid cream is helping  Labs Reviewed, Medications Reviewed, Radiology Reviewed and Wound Reviewed.    Review of Systems:  Review of Systems   Constitutional: Negative for chills and fever.   Respiratory: Negative for cough and shortness of breath.    Gastrointestinal: Negative for abdominal pain, diarrhea, nausea and vomiting.   Musculoskeletal: Positive for joint pain.   Skin: Positive for itching and rash.       Hemodynamics:  Temp (24hrs), Av.2 °C (97.2 °F), Min:36 °C (96.8 °F), Max:36.6 °C (97.9 °F)  Temperature: 36.2 °C (97.1 °F)  Pulse  Av.4  Min: 60  Max: 101   Blood Pressure: 111/72       Physical Exam:  Physical Exam   Constitutional: He is oriented to person, place, and time. He appears well-developed. No distress.   Thin  Looks older than stated age   HENT:   Head: Normocephalic and atraumatic.   poor dentition   Eyes: Pupils are equal, round, and reactive to light. EOM are normal.   Neck: Neck supple.   Cardiovascular: Normal rate.    Pulmonary/Chest: Effort normal. No respiratory distress.   Abdominal: Soft. There is no tenderness.   Musculoskeletal:   Left lower extremity dressed  Right upper extremity PICC nontender    Neurological: He is alert and oriented to person, place, and time.   Skin: Skin is warm. Rash noted.   Papular rash on bilateral upper extremities chest and right lower extremity but no associated erythema   Nursing note and vitals reviewed.      Meds:    Current Facility-Administered Medications:   •  triamcinolone acetonide  •  HYDROmorphone  •  MD Alert...Vancomycin per Pharmacy  •  cefTRIAXone (ROCEPHIN) IV  •  vancomycin  •  nicotine  •  lidocaine  •  albuterol  •  Pharmacy Consult Request  •  ondansetron  •  dexamethasone  •  diphenhydrAMINE  •  haloperidol lactate  •  scopolamine  •  docusate sodium  •  senna-docusate  •  senna-docusate  •  polyethylene glycol/lytes  •  magnesium hydroxide  •  bisacodyl  •  fleet  •  Respiratory Care per Protocol  •  D5LR  •  aspirin EC  •  oxyCODONE immediate-release  •  oxyCODONE immediate release  •  zolpidem  •  tramadol  •  diphenhydrAMINE **OR** diphenhydrAMINE  •  chlorproMAZINE **OR** chlorproMAZINE  •  benzocaine-menthol  •  tamsulosin    Labs:  Recent Labs      08/29/18   0220   WBC  8.1   RBC  4.29*   HEMOGLOBIN  12.0*   HEMATOCRIT  37.3*   MCV  86.9   MCH  28.0   RDW  44.0   PLATELETCT  251   MPV  8.8*     Recent Labs      08/28/18   1430  08/29/18   0220   SODIUM  137  135   POTASSIUM  4.7  4.7   CHLORIDE  101  102   CO2  24  24   GLUCOSE  168*  135*   BUN  18  20     Recent Labs      08/28/18   1430  08/29/18   0220   CREATININE  0.90  0.96       Imaging:  Dx-knee 2- Left    Result Date: 8/21/2018 8/21/2018 4:45 PM HISTORY/REASON FOR EXAM:  Revision of left knee arthroplasty TECHNIQUE/EXAM DESCRIPTION AND NUMBER OF VIEWS:  2 views of the LEFT knee. COMPARISON: 07/24/2018 FINDINGS: Bone density is normal.  There is no evidence of fracture or dislocation.  Postoperative changes are noted with revision of left knee arthroplasty. Screws and opaque cement are identified in the proximal tibia as well as the distal femur. No acute fracture is identified.     1.   Status post revision of left knee arthroplasty. 2.  No acute fracture or malalignment appreciated.      Micro:  Results     Procedure Component Value Units Date/Time    CULTURE TISSUE W/ GRM STAIN [[id number]] Collected:  08/21/18 1459    Order Status:  Completed Specimen:  Tissue Updated:  08/26/18 1233     Gram Stain Result No organisms seen.     Significant Indicator NEG     Source TISS     Site Left Knee     Tissue Culture No growth at 72 hours.    ANAEROBIC CULTURE [[id number]] Collected:  08/21/18 1459    Order Status:  Completed Specimen:  Tissue Updated:  08/26/18 1233     Significant Indicator NEG     Source TISS     Site Left Knee     Anaerobic Culture, Culture Res No Anaerobes isolated.    CULTURE TISSUE W/ GRM STAIN [[id number]] Collected:  08/21/18 1458    Order Status:  Completed Specimen:  Tissue Updated:  08/26/18 1233     Gram Stain Result No organisms seen.     Significant Indicator NEG     Source TISS     Site Left Knee #3     Tissue Culture No growth at 72 hours.    ANAEROBIC CULTURE [[id number]] Collected:  08/21/18 1458    Order Status:  Completed Specimen:  Tissue Updated:  08/26/18 1233     Significant Indicator NEG     Source TISS     Site Left Knee #3     Anaerobic Culture, Culture Res No Anaerobes isolated.    CULTURE TISSUE W/ GRM STAIN [[id number]] Collected:  08/21/18 1457    Order Status:  Completed Specimen:  Tissue Updated:  08/26/18 1232     Gram Stain Result No organisms seen.     Significant Indicator NEG     Source TISS     Site Left Knee #2     Tissue Culture No growth at 72 hours.    ANAEROBIC CULTURE [[id number]] Collected:  08/21/18 1457    Order Status:  Completed Specimen:  Tissue Updated:  08/26/18 1232     Significant Indicator NEG     Source TISS     Site Left Knee #2     Anaerobic Culture, Culture Res No Anaerobes isolated.    ANAEROBIC CULTURE [[id number]] Collected:  08/21/18 1456    Order Status:  Completed Specimen:  Tissue Updated:  08/26/18 1232     Significant Indicator  NEG     Source TISS     Site Left Knee Tissue     Anaerobic Culture, Culture Res No Anaerobes isolated.    CULTURE TISSUE W/ GRM STAIN [[id number]] Collected:  08/21/18 1456    Order Status:  Completed Specimen:  Tissue Updated:  08/26/18 1232     Gram Stain Result No organisms seen.     Significant Indicator NEG     Source TISS     Site Left Knee Tissue     Tissue Culture No growth at 72 hours.          Assessment:  Infected total left knee arthroplasty  Meth abuse    Plan:  Infected total left knee arthroplasty  Afebrile  No leukocytosis  States had 6 surgeries in [place] but denies infection  Cultures negative  Evidence of chronic infection was seen during surgery with purulence and chronic synovitis.   Continue vancomycin and ceftriaxone  Monitor renal function and vanco trough while on vanco.  Renal function normal and stable  Vanco trough 13.7   Anticipate 6 weeks post-op  Stop date 10/1/2018    Diffuse rash  ?  Secondary to antibiotics versus other  No evidence of erythema  Continue to monitor  If worsens, may need to change antibiotics  Symptomatic treatment  Trial steroid cream    History of methamphetamine use  Not a candidate for outpatient infusion or treatment  HIV and Hep c neg    Awaiting acceptance at [facility] - plan for transfer today    FU ID clinic

## 2018-08-30 NOTE — DISCHARGE PLANNING
Anticipated Discharge Disposition: SNF    Action:Received call from Dr. Boston this am inquiring about why pt is still Inpt. Explained that we have no accepting facilities with [facility]. Requested [facility] accepting pt. Will plan transfer for 1400 today. Called Dr. Boston office, spoke to MARGOT Roldan requested D/C order, transfer summary and RXs.    Barriers to Discharge: none    Plan:[facility]

## 2018-08-30 NOTE — PROGRESS NOTES
" Subjective:      No events.  Cultures remain NGTD.  Pain controlled.  Ambulating well.    Objective:    Blood pressure 118/81, pulse 87, temperature 36.1 °C (97 °F), resp. rate 18, height 1.829 m (6' 0.01\"), weight 77.1 kg (169 lb 15.6 oz), SpO2 91 %.    Recent Labs      08/29/18   0220   WBC  8.1   RBC  4.29*   HEMOGLOBIN  12.0*   HEMATOCRIT  37.3*   MCV  86.9   MCH  28.0   MCHC  32.2*   RDW  44.0   PLATELETCT  251   MPV  8.8*     Recent Labs      08/28/18   1430  08/29/18   0220   SODIUM  137  135   POTASSIUM  4.7  4.7   CHLORIDE  101  102   CO2  24  24   GLUCOSE  168*  135*   BUN  18  20   CREATININE  0.90  0.96   CALCIUM  9.0  8.8         Intake/Output Summary (Last 24 hours) at 08/30/18 0642  Last data filed at 08/30/18 0330   Gross per 24 hour   Intake              800 ml   Output              950 ml   Net             -150 ml       Comfortable, no distress  Neurologically and vascularly intact with palpable pedal pulses bilaterally.  Dressing C/D/I      Assessment:    Doing well s/p resection total knee arthroplasty.    Plan:       Abx: Per ID.  PICC in place  TTWB.  Will d/c immobilizer at this point.  OT/PT  DVT ppx - Lovenox + Sequential Compression Devices  DC planning - can d/c whenever SNF available  "

## 2018-08-30 NOTE — PROGRESS NOTES
Pt dc'd to [facility]. PICC left in place. Pt left unit via WC with transport tech escort. Personal belongings with pt when leaving unit. Pt given discharge instructions prior to leaving unit including prescription and when to visit with physician; verbalizes understanding. Copy of discharge instructions with pt and in the chart.    Report to accepting [facility]

## 2018-08-30 NOTE — PROGRESS NOTES
"Pharmacy Kinetics 58 y.o. male on vancomycin day # 10 2018    Currently on Vancomycin 1100 mg IV q12hr (0300, 1500)     Indication for Treatment: Infected total knee arthroplasty     Pertinent history per medical record: Admitted on 2018 for Admitted on 2018 for infected left TKA .  He is s/p resection of his arthroplasty on .  Evidence of chronic infection was seen during surgery with purulence and chronic synovitis.       Other antibiotics: Cefazolin changed to ceftriaxone 2g IV q24h per ID on 18.     Allergies: Patient has no known allergies.      List concerns for renal function: none     Pertinent cultures to date:   18 tissue, left knee: NGTD  18 tissue, left knee #2: NGTD  18 tissue, left knee #3: NGTD    Recent Labs      18   0220   WBC  8.1     Recent Labs      18   1430  18   0220   BUN  18  20   CREATININE  0.90  0.96     Recent Labs      18   1430   VANCOTROUGH  13.7     Intake/Output Summary (Last 24 hours) at 18 1043  Last data filed at 18 0330   Gross per 24 hour   Intake              800 ml   Output              950 ml   Net             -150 ml      Blood pressure 111/72, pulse 82, temperature 36.2 °C (97.1 °F), resp. rate 18, height 1.829 m (6' 0.01\"), weight 77.1 kg (169 lb 15.6 oz), SpO2 94 %. Temp (24hrs), Av.2 °C (97.2 °F), Min:36 °C (96.8 °F), Max:36.6 °C (97.9 °F)      A/P   1. Vancomycin dose change: No.   2. Next vancomycin level: Tomorrow, 18 at 1430   3. Goal trough: 12 - 16 mcg/mL   Comments: VS stable, Afebrile. No new BMP. Renal function previously stable. Ordered a BMP x one for tomorrow morning to monitor renal function while on vancomycin therapy. Repeat trough level tomorrow afternoon to ensure patient remains within goal range as outlined above. ID is consulting - anticipate 6 weeks of ABX therapy post-op, stop date 10/1/18.     [place]ves, GloD      "

## 2018-08-30 NOTE — CARE PLAN
Problem: Safety  Goal: Will remain free from injury  Outcome: PROGRESSING AS EXPECTED  Proper signs communicated in pts doorway; floor clear of clutter, debris, or cords; pts personal items and call light within reach; pt educated to use call light for assistance and prior to getting out of bed    Problem: Discharge Barriers/Planning  Goal: Patient's continuum of care needs will be met  Outcome: PROGRESSING AS EXPECTED  PT to DC to SNF today

## 2018-08-30 NOTE — DISCHARGE PLANNING
Received Transport Form @ [id number]  Spoke to Gail @ [facility]- does not have any transportation for today  Emailed transportation request to [employer] at 1053.  Spoke to Gilma @ [facility]    Will have to check with supervisor and then will call this CCA back.

## 2018-08-30 NOTE — CARE PLAN
Problem: Knowledge Deficit  Goal: Knowledge of the prescribed therapeutic regimen will improve  Outcome: PROGRESSING SLOWER THAN EXPECTED  Reviewed POC including safety, mobility, pain management, medication administration, DVT prophylaxis, and discharge. Pt needs frequent encouragement to maintain TTWB precautions.     Problem: Discharge Barriers/Planning  Goal: Patient's continuum of care needs will be met  Outcome: PROGRESSING SLOWER THAN EXPECTED  Pt to transition to skilled nursing facility. Pt declined at all facilities applied to at this time. Social work on board for placement.

## 2018-08-30 NOTE — DISCHARGE INSTRUCTIONS
Discharge Instructions    Discharged to other by medical transportation with escort. Discharged via wheelchair, hospital escort: Yes.  Special equipment needed: Not Applicable    Be sure to schedule a follow-up appointment with your primary care doctor or any specialists as instructed.     Discharge Plan:   Smoking Cessation Offered: Patient Refused  Influenza Vaccine Indication: Not indicated: Previously immunized this influenza season and > 8 years of age    I understand that a diet low in cholesterol, fat, and sodium is recommended for good health. Unless I have been given specific instructions below for another diet, I accept this instruction as my diet prescription.   Other diet: Regular diet as tolerated    Special Instructions: Discharge instructions for the Orthopedic Patient    Follow up with Primary Care Physician within 2 weeks of discharge to home, regarding:  Review of medications and diagnostic testing.  Surveillance for medical complications.  Workup and treatment of osteoporosis, if appropriate.     -Is this a Joint Replacement patient? Yes Total Joint Knee Replacement Discharge Instructions    Pain  - The goal is to slowly wean off the prescription pain medicine.  - Ice can be used for pain control.  20 minutes at a time is recommended, and never directly against your skin or incision.  - Most patients are off the pain pills by 3 weeks; others may require a low level of pain medications for many months.  If your pain continues to be severe, follow up with your physician.  Infection    Knee joint infections; occur in fewer than 2% of patients. The most common causes of infection following total knee replacement surgery are from bacteria that enter the bloodstream during dental procedures, urinary tract infections, or skin infections. These bacteria can lodge around your knee replacement and cause an infection.  - Keep the incision as clean and dry as possible.  - Always wash your hands before  touching your incision.  - Skin infections tend to develop around 7-10 days after surgery; most can be treated with oral antibiotics.  - Dental Care should be delayed for 3 months after surgery, your surgeon recommends taking a dose of antibiotics 1 hour prior to any dental procedure. After 2 years, most surgeons recommend antibiotics only before an extensive procedure.  Ask your surgeon what he recommends.  - Signs and symptoms of infection can include:  low grade fever, redness, pain, swelling and drainage from your incision.  Notify your surgeon immediately if you develop any of these symptoms.  Other instructions  - Bowel habits - constipation is extremely common and is caused by a combination of anesthesia, lack of mobility and pain medicine.  Use stool softeners or laxatives if necessary. It is important not to ignore this problem, as bowel obstructions can be a serious complication after joint replacement surgery.  - Mood/Energy Level - Many patients experience a lack of energy and endurance for up to 2-3 months after surgery.  Some may also feel down and can even become depressed.  This is likely due to the postoperative anemia, change in activity level, lack of sleep, pain medicine and just the emotional reaction to the surgery itself that is a big disruption in a person’s life.  This usually passes.  If symptoms persist, follow up with your primary physician.  - Returning to work - Your surgeon will give you more specific instructions. Depending on the type of activities you perform, it may be 6 to 8 weeks before you return to work.   Generally, if you work a sedentary job requiring little standing or walking, most patients may return within 2-6 weeks.  Manual labor jobs involving walking, lifting and standing may take longer. Your surgeon’s office can provide a release to part-time or light duty work early on in your recovery and progress you to full duty as able.    - Driving - If your left knee was  replaced and you have an automatic transmission, you may be able to begin driving in a week or so, provided you are no longer taking narcotic pain medication. If your right knee was replaced, avoid driving for 6 to 8 weeks. Remember that your reflexes may not be as sharp as before your surgery. Ask your surgeon for specific instructions.   - Avoiding falls - A fall during the first few weeks after surgery can damage your new knee and may result in a need for further surgery.   throw rugs and tack down loose carpeting.  Be aware of floor hazards such as pets, small objects or uneven surfaces.    - [employer] Metal Detectors - The sensitivity of metal detectors varies and it is likely that your prosthesis will cause an alarm.  Inform the  of your artificial joint.  Diet  - Resume your normal diet as tolerated.  - It is important to achieve a healthy nutritional status by eating a well balanced diet on a regular basis.  - Your physician may recommend that you take iron and vitamin supplements.   - Continue to drink plenty of fluids.  Shower/Bathing  - You may shower as soon as you get home from the hospital unless otherwise instructed.  - Keep your incision out of water.  To keep the incision dry when showering, cover it with a plastic bag or plastic wrap.  - Pat incision dry if it gets wet.  Don’t rub.  - Do not submerge in a bath until staples are out and the incision is completely healed. (Approximately 6-8 weeks)  Dressing Change:  Procedure (if recommended by your physician)  - Wash hands.  - Open all new dressing change materials.  - Remove old dressing and discard.  - Inspect incision for redness, increase in clear drainage, yellow/green drainage, odor and surrounding skin hot to touch.  -  ABD (large gauze) pad or “island dressing” by one corner and lay over the incision.  Be careful not to touch the inside of the dressing that will lay over the incision.  - Secure in place as  instructed (Ace wrap or tape).    Swelling/Bruising    - Swelling can last from 3-6 months.  - Elevate your leg higher than your heart while reclining.   The first week you are home you should elevate your leg an equal amount of time, as you are active.    - Anti-inflammatory pills can be taken once you have stopped the blood thinners.  - The swelling is usually worse after you go home since you are upright for longer periods of time.  - Bruising is common and can involve the entire leg including the thigh, calf and even foot. Bruising often does not appear until after you arrive home and it can be quite dramatic- purple, black, and green.  The bruising you can see is not usually concerning and will subside without any treatment.      Blood Clot Prevention  Blood clots in the legs and the less common, but frightening, clots that travel to the lungs are a real focus of our preventative. Most patients are at standard risk for them, but those patients who are at higher risk include people who have had previous clots, a family history of clotting, smoking, diabetes, obesity, advanced age, use of estrogen and a sedentary lifestyle.    - Signs of blood clots in legs - Swelling in thigh, calf or ankle that does not go down with elevation.  Pain, heat and tenderness in calf, back of calf or groin area.  NOTE: blood clots can occur in either leg.  - You have been receiving anticoagulant therapy (blood thinners) in the hospital and you may be instructed to continue at home depending on your risk factors.  - Your risk for developing a clot continues for up to 2-3 months after surgery.  You should avoid prolonged sitting and dehydration during that time (long air trips and car trips).  If you do take a trip during this time, please get up and move around every 1- 1.5 hours.  - If you are prescribed blood-thinning medication for home, follow instructions as directed. (Handouts provided if applicable).      Activity  Once home,  you should continue to stay active. The key is to remember not to overdo it! While you can expect some good days and some bad days, you should notice a gradual improvement and a gradual increase in your endurance over the next 6 to 12 months. Exercise is a critical component of home care, particularly during the first few weeks after surgery.     - Normal activities of daily living You should be able to resume most within 3 to 6 weeks following surgery. Some pain with activity and at night is common for several weeks after surgery  -  Physical Therapy Exercises - Continue to do the exercises prescribed for at least two months after surgery. Riding a stationary bicycle can help maintain muscle tone and keep your knee flexible. Try to achieve the maximum degree of bending and extension possible. (handout provided by Therapist).  - Sexual Activity -. Your surgeon can tell you when it safe to resume sexual activity.    - Sleeping Positions - You can safely sleep on your back, on either side, or on your stomach.   - Other Activities - Walk as much as you like, but remember that walking is no substitute for the exercises your doctor and physical therapist will prescribe. Lower impact activities are preferred.  If you have specific questions, consult your Surgeon.    When to Call the Doctor   Call the physician if:   - Fever over 100.5? F  - Increased pain, drainage, redness, odor or heat around the incision area  - Shaking chills  - Increased knee pain with activity and rest  - Increased pain in calf, tenderness or redness above or below the knee  - Increased swelling of calf, ankle, foot  - Sudden increased shortness of breath, sudden onset of chest pain, localized chest pain with coughing  - Incision opening  Or, if there are any questions or concerns about medications or care.       -Is this patient being discharged with medication to prevent blood clots?      PICC Line Instructions    How to Care for your PICC  • Do  "not take a bath, swim, or use hot tubs when you have a PICC. Cover PICC line with clear plastic wrap and tape to keep it dry while showering  • Check the PICC insertion site daily for leakage, redness, swelling, or pain.  • Flush the PICC as directed by your health care provider. Let your health care provider know right away if the PICC is difficult to flush or does not flush. Do not use force to flush the PICC.  • Do not use a syringe that is less than 10 mL to flush the PICC  • Avoid blood pressure checks on the arm with the PICC.  • Do not take the PICC out yourself. Only a trained clinical professional should remove the PICC  • Make sure you or anyone who access your PICC Line washes their hands before using the line  • Make sure the hub of the line is \"scrubbed\" prior to using the line  Dressing Changes  • Change the PICC dressing as instructed by your health care provider.  • Change your PICC dressing if it becomes loose or wet.  When to seek medical attention  • PICC is accidentally pulled all the way out  • There is any type of drainage, redness, or swelling where the PICC enters the skin.  • You cannot flush the PICC, it is difficult to flush, or the PICC leaks around the insertion site when it is flushed.  • You hear a \"flushing\" sound when the PICC is flushed.  • You notice a hole or tear in the PICC.  • You develop chills or a fever      Yes, Aspirin Aspirin, ASA oral tablets  What is this medicine?  ASPIRIN (AS pir in) is a pain reliever. It is used to treat mild pain and fever. This medicine is also used as directed by a doctor to prevent and to treat heart attacks, to prevent strokes, and to treat arthritis or inflammation.  This medicine may be used for other purposes; ask your health care provider or pharmacist if you have questions.  COMMON BRAND NAME(S): Aspir-Low, Aspir-Zakia, Aspirtab, Claire Advanced Aspirin, Claire Aspirin, Claire Aspirin Extra Strength, Claire Aspirin Plus, Claire Extra Strength, " Claire Extra Strength Plus, Claire Genuine Aspirin, Claire Womens Aspirin, Bufferin, Bufferin Extra Strength, Bufferin Low Dose  What should I tell my health care provider before I take this medicine?  They need to know if you have any of these conditions:  -anemia  -asthma  -bleeding problems  -child with chickenpox, the flu, or other viral infection  -diabetes  -gout  -if you frequently drink alcohol containing drinks  -kidney disease  -liver disease  -low level of vitamin K  -lupus  -smoke tobacco  -stomach ulcers or other problems  -an unusual or allergic reaction to aspirin, tartrazine dye, other medicines, dyes, or preservatives  -pregnant or trying to get pregnant  -breast-feeding  How should I use this medicine?  Take this medicine by mouth with a glass of water. Follow the directions on the package or prescription label. You can take this medicine with or without food. If it upsets your stomach, take it with food. Do not take your medicine more often than directed.  Talk to your pediatrician regarding the use of this medicine in children. While this drug may be prescribed for children as young as 12 years of age for selected conditions, precautions do apply. Children and teenagers should not use this medicine to treat chicken pox or flu symptoms unless directed by a doctor.  Patients over 65 years old may have a stronger reaction and need a smaller dose.  Overdosage: If you think you have taken too much of this medicine contact a poison control center or emergency room at once.  NOTE: This medicine is only for you. Do not share this medicine with others.  What if I miss a dose?  If you are taking this medicine on a regular schedule and miss a dose, take it as soon as you can. If it is almost time for your next dose, take only that dose. Do not take double or extra doses.  What may interact with this medicine?  Do not take this medicine with any of the following  medications:  -cidofovir  -ketorolac  -probenecid  This medicine may also interact with the following medications:  -alcohol  -alendronate  -bismuth subsalicylate  -flavocoxid  -herbal supplements like feverfew, garlic, everardo, ginkgo biloba, horse chestnut  -medicines for diabetes or glaucoma like acetazolamide, methazolamide  -medicines for gout  -medicines that treat or prevent blood clots like enoxaparin, heparin, ticlopidine, warfarin  -other aspirin and aspirin-like medicines  -NSAIDs, medicines for pain and inflammation, like ibuprofen or naproxen  -pemetrexed  -sulfinpyrazone  -varicella live vaccine  This list may not describe all possible interactions. Give your health care provider a list of all the medicines, herbs, non-prescription drugs, or dietary supplements you use. Also tell them if you smoke, drink alcohol, or use illegal drugs. Some items may interact with your medicine.  What should I watch for while using this medicine?  If you are treating yourself for pain, tell your doctor or health care professional if the pain lasts more than 10 days, if it gets worse, or if there is a new or different kind of pain. Tell your doctor if you see redness or swelling. Also, check with your doctor if you have a fever that lasts for more than 3 days. Only take this medicine to prevent heart attacks or blood clotting if prescribed by your doctor or health care professional.  Do not take aspirin or aspirin-like medicines with this medicine. Too much aspirin can be dangerous. Always read the labels carefully.  This medicine can irritate your stomach or cause bleeding problems. Do not smoke cigarettes or drink alcohol while taking this medicine. Do not lie down for 30 minutes after taking this medicine to prevent irritation to your throat.  If you are scheduled for any medical or dental procedure, tell your healthcare provider that you are taking this medicine. You may need to stop taking this medicine before the  procedure.  This medicine may be used to treat migraines. If you take migraine medicines for 10 or more days a month, your migraines may get worse. Keep a diary of headache days and medicine use. Contact your healthcare professional if your migraine attacks occur more frequently.  What side effects may I notice from receiving this medicine?  Side effects that you should report to your doctor or health care professional as soon as possible:  -allergic reactions like skin rash, itching or hives, swelling of the face, lips, or tongue  -breathing problems  -changes in hearing, ringing in the ears  -confusion  -general ill feeling or flu-like symptoms  -pain on swallowing  -redness, blistering, peeling or loosening of the skin, including inside the mouth or nose  -signs and symptoms of bleeding such as bloody or black, tarry stools; red or dark-brown urine; spitting up blood or brown material that looks like coffee grounds; red spots on the skin; unusual bruising or bleeding from the eye, gums, or nose  -trouble passing urine or change in the amount of urine  -unusually weak or tired  -yellowing of the eyes or skin  Side effects that usually do not require medical attention (report to your doctor or health care professional if they continue or are bothersome):  -diarrhea or constipation  -headache  -nausea, vomiting  -stomach gas, heartburn  This list may not describe all possible side effects. Call your doctor for medical advice about side effects. You may report side effects to FDA at 2-684-FDA-7967.  Where should I keep my medicine?  Keep out of the reach of children.  Store at room temperature between 15 and 30 degrees C (59 and 86 degrees F). Protect from heat and moisture. Do not use this medicine if it has a strong vinegar smell. Throw away any unused medicine after the expiration date.  NOTE: This sheet is a summary. It may not cover all possible information. If you have questions about this medicine, talk to  your doctor, pharmacist, or health care provider.  © 2018 [employer] (2014-08-19 11:30:31)      · Is patient discharged on Warfarin / Coumadin?   No     Depression / Suicide Risk    As you are discharged from this [facility] facility, it is important to learn how to keep safe from harming yourself.    Recognize the warning signs:  · Abrupt changes in personality, positive or negative- including increase in energy   · Giving away possessions  · Change in eating patterns- significant weight changes-  positive or negative  · Change in sleeping patterns- unable to sleep or sleeping all the time   · Unwillingness or inability to communicate  · Depression  · Unusual sadness, discouragement and loneliness  · Talk of wanting to die  · Neglect of personal appearance   · Rebelliousness- reckless behavior  · Withdrawal from people/activities they love  · Confusion- inability to concentrate     If you or a loved one observes any of these behaviors or has concerns about self-harm, here's what you can do:  · Talk about it- your feelings and reasons for harming yourself  · Remove any means that you might use to hurt yourself (examples: pills, rope, extension cords, firearm)  · Get professional help from the community (Mental Health, Substance Abuse, psychological counseling)  · Do not be alone:Call your Safe Contact- someone whom you trust who will be there for you.  · Call your local CRISIS HOTLINE 099-1591 or 922-320-7172  · Call your local Children's Mobile Crisis Response Team [place] (358) 149-9307 or www.[employer]  · Call the toll free National Suicide Prevention Hotlines   · National Suicide Prevention Lifeline 540-974-CWHA (4808)  · National Hope Line Network 800-SUICIDE (730-0690)

## 2018-08-31 ENCOUNTER — HOSPITAL ENCOUNTER (OUTPATIENT)
Dept: LAB | Facility: MEDICAL CENTER | Age: 58
End: 2018-08-31
Attending: INTERNAL MEDICINE
Payer: COMMERCIAL

## 2018-08-31 LAB
ANION GAP SERPL CALC-SCNC: 7 MMOL/L (ref 0–11.9)
BASOPHILS # BLD AUTO: 0.5 % (ref 0–1.8)
BASOPHILS # BLD: 0.05 K/UL (ref 0–0.12)
BUN SERPL-MCNC: 18 MG/DL (ref 8–22)
CALCIUM SERPL-MCNC: 9.5 MG/DL (ref 8.5–10.5)
CHLORIDE SERPL-SCNC: 101 MMOL/L (ref 96–112)
CO2 SERPL-SCNC: 27 MMOL/L (ref 20–33)
CREAT SERPL-MCNC: 1.02 MG/DL (ref 0.5–1.4)
EOSINOPHIL # BLD AUTO: 0.37 K/UL (ref 0–0.51)
EOSINOPHIL NFR BLD: 3.6 % (ref 0–6.9)
ERYTHROCYTE [DISTWIDTH] IN BLOOD BY AUTOMATED COUNT: 43.8 FL (ref 35.9–50)
GLUCOSE SERPL-MCNC: 123 MG/DL (ref 65–99)
HCT VFR BLD AUTO: 39.9 % (ref 42–52)
HGB BLD-MCNC: 13.3 G/DL (ref 14–18)
IMM GRANULOCYTES # BLD AUTO: 0.13 K/UL (ref 0–0.11)
IMM GRANULOCYTES NFR BLD AUTO: 1.3 % (ref 0–0.9)
LYMPHOCYTES # BLD AUTO: 1.75 K/UL (ref 1–4.8)
LYMPHOCYTES NFR BLD: 17.1 % (ref 22–41)
MCH RBC QN AUTO: 29 PG (ref 27–33)
MCHC RBC AUTO-ENTMCNC: 33.3 G/DL (ref 33.7–35.3)
MCV RBC AUTO: 86.9 FL (ref 81.4–97.8)
MONOCYTES # BLD AUTO: 0.99 K/UL (ref 0–0.85)
MONOCYTES NFR BLD AUTO: 9.7 % (ref 0–13.4)
NEUTROPHILS # BLD AUTO: 6.92 K/UL (ref 1.82–7.42)
NEUTROPHILS NFR BLD: 67.8 % (ref 44–72)
NRBC # BLD AUTO: 0 K/UL
NRBC BLD-RTO: 0 /100 WBC
PLATELET # BLD AUTO: 352 K/UL (ref 164–446)
PMV BLD AUTO: 8.6 FL (ref 9–12.9)
POTASSIUM SERPL-SCNC: 4.9 MMOL/L (ref 3.6–5.5)
RBC # BLD AUTO: 4.59 M/UL (ref 4.7–6.1)
SODIUM SERPL-SCNC: 135 MMOL/L (ref 135–145)
WBC # BLD AUTO: 10.2 K/UL (ref 4.8–10.8)

## 2018-09-01 ENCOUNTER — HOSPITAL ENCOUNTER (OUTPATIENT)
Dept: LAB | Facility: MEDICAL CENTER | Age: 58
End: 2018-09-01
Attending: INTERNAL MEDICINE
Payer: MEDICAID

## 2018-09-01 LAB
ANION GAP SERPL CALC-SCNC: 10 MMOL/L (ref 0–11.9)
BUN SERPL-MCNC: 19 MG/DL (ref 8–22)
CALCIUM SERPL-MCNC: 9 MG/DL (ref 8.5–10.5)
CHLORIDE SERPL-SCNC: 104 MMOL/L (ref 96–112)
CO2 SERPL-SCNC: 25 MMOL/L (ref 20–33)
CREAT SERPL-MCNC: 0.87 MG/DL (ref 0.5–1.4)
GLUCOSE SERPL-MCNC: 169 MG/DL (ref 65–99)
POTASSIUM SERPL-SCNC: 4 MMOL/L (ref 3.6–5.5)
SODIUM SERPL-SCNC: 139 MMOL/L (ref 135–145)
VANCOMYCIN TROUGH SERPL-MCNC: 12.4 UG/ML (ref 10–20)

## 2018-09-01 PROCEDURE — 80048 BASIC METABOLIC PNL TOTAL CA: CPT

## 2018-09-01 PROCEDURE — 80202 ASSAY OF VANCOMYCIN: CPT

## 2018-09-04 LAB
EST. AVERAGE GLUCOSE BLD GHB EST-MCNC: 131 MG/DL
HBA1C MFR BLD: 6.2 % (ref 0–5.6)
VANCOMYCIN TROUGH SERPL-MCNC: 13.1 UG/ML (ref 10–20)

## 2018-09-04 PROCEDURE — 83036 HEMOGLOBIN GLYCOSYLATED A1C: CPT

## 2018-09-04 PROCEDURE — 80202 ASSAY OF VANCOMYCIN: CPT

## 2018-09-06 LAB
CRP SERPL HS-MCNC: 0.47 MG/DL (ref 0–0.75)
ERYTHROCYTE [SEDIMENTATION RATE] IN BLOOD BY WESTERGREN METHOD: 15 MM/HOUR (ref 0–20)

## 2018-09-06 PROCEDURE — 36415 COLL VENOUS BLD VENIPUNCTURE: CPT

## 2018-09-06 PROCEDURE — 85652 RBC SED RATE AUTOMATED: CPT

## 2018-09-06 PROCEDURE — 86140 C-REACTIVE PROTEIN: CPT

## 2018-09-07 LAB — VANCOMYCIN TROUGH SERPL-MCNC: 19.8 UG/ML (ref 10–20)

## 2018-09-07 PROCEDURE — 80202 ASSAY OF VANCOMYCIN: CPT

## 2018-09-11 LAB
ANION GAP SERPL CALC-SCNC: 8 MMOL/L (ref 0–11.9)
BUN SERPL-MCNC: 14 MG/DL (ref 8–22)
CALCIUM SERPL-MCNC: 9.5 MG/DL (ref 8.5–10.5)
CHLORIDE SERPL-SCNC: 103 MMOL/L (ref 96–112)
CO2 SERPL-SCNC: 26 MMOL/L (ref 20–33)
CREAT SERPL-MCNC: 0.89 MG/DL (ref 0.5–1.4)
GLUCOSE SERPL-MCNC: 83 MG/DL (ref 65–99)
POTASSIUM SERPL-SCNC: 4.4 MMOL/L (ref 3.6–5.5)
SODIUM SERPL-SCNC: 137 MMOL/L (ref 135–145)
VANCOMYCIN TROUGH SERPL-MCNC: 40.2 UG/ML (ref 10–20)

## 2018-09-11 PROCEDURE — 80202 ASSAY OF VANCOMYCIN: CPT

## 2018-09-11 PROCEDURE — 80048 BASIC METABOLIC PNL TOTAL CA: CPT

## 2018-09-12 LAB
ANION GAP SERPL CALC-SCNC: 6 MMOL/L (ref 0–11.9)
BUN SERPL-MCNC: 14 MG/DL (ref 8–22)
CALCIUM SERPL-MCNC: 9.5 MG/DL (ref 8.5–10.5)
CHLORIDE SERPL-SCNC: 103 MMOL/L (ref 96–112)
CO2 SERPL-SCNC: 29 MMOL/L (ref 20–33)
CREAT SERPL-MCNC: 1.01 MG/DL (ref 0.5–1.4)
GLUCOSE SERPL-MCNC: 88 MG/DL (ref 65–99)
POTASSIUM SERPL-SCNC: 4.6 MMOL/L (ref 3.6–5.5)
SODIUM SERPL-SCNC: 138 MMOL/L (ref 135–145)
VANCOMYCIN TROUGH SERPL-MCNC: 17.8 UG/ML (ref 10–20)

## 2018-09-12 PROCEDURE — 80202 ASSAY OF VANCOMYCIN: CPT

## 2018-09-12 PROCEDURE — 80048 BASIC METABOLIC PNL TOTAL CA: CPT

## 2018-09-13 LAB
CRP SERPL HS-MCNC: 0.59 MG/DL (ref 0–0.75)
ERYTHROCYTE [SEDIMENTATION RATE] IN BLOOD BY WESTERGREN METHOD: 20 MM/HOUR (ref 0–20)

## 2018-09-13 PROCEDURE — 86140 C-REACTIVE PROTEIN: CPT

## 2018-09-13 PROCEDURE — 36415 COLL VENOUS BLD VENIPUNCTURE: CPT

## 2018-09-13 PROCEDURE — 85652 RBC SED RATE AUTOMATED: CPT

## 2018-09-14 LAB — VANCOMYCIN TROUGH SERPL-MCNC: 23 UG/ML (ref 10–20)

## 2018-09-14 PROCEDURE — 80202 ASSAY OF VANCOMYCIN: CPT

## 2018-09-17 LAB
ANION GAP SERPL CALC-SCNC: 9 MMOL/L (ref 0–11.9)
BUN SERPL-MCNC: 17 MG/DL (ref 8–22)
CALCIUM SERPL-MCNC: 9.1 MG/DL (ref 8.5–10.5)
CHLORIDE SERPL-SCNC: 103 MMOL/L (ref 96–112)
CO2 SERPL-SCNC: 25 MMOL/L (ref 20–33)
CREAT SERPL-MCNC: 0.98 MG/DL (ref 0.5–1.4)
GLUCOSE SERPL-MCNC: 136 MG/DL (ref 65–99)
POTASSIUM SERPL-SCNC: 4.2 MMOL/L (ref 3.6–5.5)
SODIUM SERPL-SCNC: 137 MMOL/L (ref 135–145)
VANCOMYCIN TROUGH SERPL-MCNC: 11.7 UG/ML (ref 10–20)

## 2018-09-17 PROCEDURE — 80048 BASIC METABOLIC PNL TOTAL CA: CPT

## 2018-09-17 PROCEDURE — 80202 ASSAY OF VANCOMYCIN: CPT

## 2018-09-18 ENCOUNTER — OFFICE VISIT (OUTPATIENT)
Dept: INFECTIOUS DISEASES | Facility: MEDICAL CENTER | Age: 58
End: 2018-09-18
Payer: MEDICAID

## 2018-09-18 VITALS
OXYGEN SATURATION: 97 % | WEIGHT: 171.2 LBS | BODY MASS INDEX: 23.19 KG/M2 | TEMPERATURE: 97.4 F | HEART RATE: 100 BPM | DIASTOLIC BLOOD PRESSURE: 70 MMHG | SYSTOLIC BLOOD PRESSURE: 130 MMHG | HEIGHT: 72 IN

## 2018-09-18 DIAGNOSIS — Z96.659 INFECTION OF PROSTHETIC KNEE JOINT, SUBSEQUENT ENCOUNTER: ICD-10-CM

## 2018-09-18 DIAGNOSIS — T84.59XD INFECTION OF PROSTHETIC KNEE JOINT, SUBSEQUENT ENCOUNTER: ICD-10-CM

## 2018-09-18 DIAGNOSIS — F15.10 METHAMPHETAMINE USE (HCC): ICD-10-CM

## 2018-09-18 PROCEDURE — 99214 OFFICE O/P EST MOD 30 MIN: CPT | Performed by: NURSE PRACTITIONER

## 2018-09-18 RX ORDER — METFORMIN HYDROCHLORIDE 500 MG/1
500 TABLET, EXTENDED RELEASE ORAL DAILY
COMMUNITY
End: 2018-11-02

## 2018-09-18 RX ORDER — ONDANSETRON 4 MG/1
4 TABLET, FILM COATED ORAL EVERY 4 HOURS PRN
COMMUNITY
End: 2018-11-02

## 2018-09-18 RX ORDER — FAMOTIDINE 20 MG/1
20 TABLET, FILM COATED ORAL 2 TIMES DAILY
COMMUNITY
End: 2018-11-02

## 2018-09-18 RX ORDER — BISACODYL 10 MG
10 SUPPOSITORY, RECTAL RECTAL DAILY
COMMUNITY
End: 2018-11-02

## 2018-09-18 RX ORDER — ALBUTEROL SULFATE 90 UG/1
2 AEROSOL, METERED RESPIRATORY (INHALATION) EVERY 6 HOURS PRN
COMMUNITY
End: 2018-11-02 | Stop reason: SDUPTHER

## 2018-09-18 RX ORDER — TRAMADOL HYDROCHLORIDE 50 MG/1
50 TABLET ORAL EVERY 4 HOURS PRN
COMMUNITY
End: 2018-11-02

## 2018-09-18 NOTE — PROGRESS NOTES
Subjective:     Chief Complaint   Patient presents with   • Hospital Follow-up     Infected total left knee arthroplasty       Infectious Disease clinic follow up  Korey Davis 58 y.o.male in clinic today for evaluation and management of Infected total left knee arthroplasty.  This is my first time meeting Mr. Davis.  Primary care provider: Pcp Pt States None. Current tobacco abuse (1/2 PPD). Hx of methamphetamine abuse. Patient stating he quit 5 years ago. Denies history of diabetes, hypertension, heart disease/lung disease.    Interval History: Patient hospitalized 8/21/18-8/30/18  for resection of an infected left total knee   arthroplasty.   Patient stating that from [id number] he underwent 6 left knee surgeries, including 2 left total knee replacements. Patient stating that none of these subsequent surgeries were related to infection.  Patient stating that he was seen in the [facility] emergency room in July due to left knee pain. Per patient he was placed in an immobilizer and referred to Dr. Boston. Patient stating that he did follow up with Dr. Boston at the [facility] and that a joint aspiration was done in clinic. Patient stating that he was then called and informed that he had an infection and needed to be admitted for surgery.  He was admitted for the initial surgery of a 2-stage revision.  During the explantation of his arthroplasty, there was some purulence and chronic synovitis noted.  Cultures negative. Patient was discharged to skilled nursing on IV vancomycin and ceftriaxone. Plan for 6 weeks IV antibiotics post operatively. Stop date 10/1/18.     Hospital records reviewed     Today 9/18/18: Patient reports feeling well.  He is reporting dramatic improvement in his knee pain.              Pt stating that the surgical incision is healing well. Patient being closely followed by Dr. Botson at the [facility].  Denies drainage, pungent odor, redness, pain. Patient stating that he is  tolerating the IV vancomycin and IV ceftriaxone without adverse effect. He has no questions or concerns regarding the IV antibiotics her PICC line at this time. Denies feeling generally ill, fevers/chills, general malaise, headache, n/v/d. Rash is resolved.      Past Medical History:   Diagnosis Date   • Arthritis     generalized, knees   • Asthma    • Back pain 08/17/2018    neck, knees, 8.5/10   • Breath shortness    • COPD (chronic obstructive pulmonary disease) (HCC)    • Dental disorder     upper partial, doesn't use   • Pneumonia 06/2017   • Urinary bladder disorder     reports frequency       Allergies: Patient has no known allergies.    Current medications and problem list reviewed with patient and updated in EPIC.    ROS  As documented above in my HPI       Objective:     PE:  /70   Pulse 100   Temp 36.3 °C (97.4 °F)   Ht 1.829 m (6')   Wt 77.7 kg (171 lb 3.2 oz)   SpO2 97%   BMI 23.22 kg/m²      Vital signs reviewed  Constitutional: patient is oriented to person, place, and time. Appears well-developed and well-nourished. No distress. Thin. Older than stated age.  Eyes: Conjunctivae normal and EOM are normal. Pupils are equal, round, and reactive to light.   Mouth/Throat: Lips without lesions, good dentition, oropharynx is clear and moist. Poor dentition.  Neck: Trachea midline. Normal range of motion. Neck supple. No masses  Cardiovascular: Normal rate, regular rhythm, normal heart sounds and intact distal pulses. No murmur, gallop, or friction rub. No edema.  Pulmonary/Chest: No respiratory distress. Unlabored respiratory effort, lungs clear to auscultation. No wheezes or rales.   Abdominal: Soft, non tender. BS + x 4. No masses or hepatosplenomegaly.   Musculoskeletal: Left knee: Limitation in range of motion due to presence of spacer. Patient wearing an immobilizer and in a wheelchair. Incision healing well. Steri-Strips in place, incision well approximated. No erythema or drainage  noted  Neurological: He is alert and oriented to person, place, and time. No cranial nerve deficit. Coordination normal.   Skin: Skin is warm and dry. Good turgor. No rashes visable.  GEORGINA PICC in place- CDI. No erythema. Non tender. No rash noted  Psychiatric: Normal mood and affect. Behavior is normal.     Labs:  WBC   Date/Time Value Ref Range Status   08/31/2018 03:00 AM 10.2 4.8 - 10.8 K/uL Final     RBC   Date/Time Value Ref Range Status   08/31/2018 03:00 AM 4.59 (L) 4.70 - 6.10 M/uL Final     Hemoglobin   Date/Time Value Ref Range Status   08/31/2018 03:00 AM 13.3 (L) 14.0 - 18.0 g/dL Final     Hematocrit   Date/Time Value Ref Range Status   08/31/2018 03:00 AM 39.9 (L) 42.0 - 52.0 % Final     MCV   Date/Time Value Ref Range Status   08/31/2018 03:00 AM 86.9 81.4 - 97.8 fL Final     MCH   Date/Time Value Ref Range Status   08/31/2018 03:00 AM 29.0 27.0 - 33.0 pg Final     MCHC   Date/Time Value Ref Range Status   08/31/2018 03:00 AM 33.3 (L) 33.7 - 35.3 g/dL Final     MPV   Date/Time Value Ref Range Status   08/31/2018 03:00 AM 8.6 (L) 9.0 - 12.9 fL Final        Sodium   Date/Time Value Ref Range Status   09/17/2018 08:00  135 - 145 mmol/L Final     Potassium   Date/Time Value Ref Range Status   09/17/2018 08:00 AM 4.2 3.6 - 5.5 mmol/L Final     Chloride   Date/Time Value Ref Range Status   09/17/2018 08:00  96 - 112 mmol/L Final     Co2   Date/Time Value Ref Range Status   09/17/2018 08:00 AM 25 20 - 33 mmol/L Final     Glucose   Date/Time Value Ref Range Status   09/17/2018 08:00  (H) 65 - 99 mg/dL Final     Bun   Date/Time Value Ref Range Status   09/17/2018 08:00 AM 17 8 - 22 mg/dL Final     Creatinine   Date/Time Value Ref Range Status   09/17/2018 08:00 AM 0.98 0.50 - 1.40 mg/dL Final       Alkaline Phosphatase   Date/Time Value Ref Range Status   06/30/2016 02:20 PM 70 30 - 99 U/L Final     AST(SGOT)   Date/Time Value Ref Range Status   06/30/2016 02:20 PM 15 12 - 45 U/L Final      ALT(SGPT)   Date/Time Value Ref Range Status   06/30/2016 02:20 PM 16 2 - 50 U/L Final     Total Bilirubin   Date/Time Value Ref Range Status   06/30/2016 02:20 PM 0.3 0.1 - 1.5 mg/dL Final        CPK Total   Date/Time Value Ref Range Status   06/23/2016 07:57  (H) 0 - 154 U/L Final          Assessment and Plan:   The following treatment plan was discussed with patient at length  1. Infection of prosthetic knee joint, subsequent encounter  New to provider   2. Methamphetamine use       Continue IV vancomycin and ceftriaxone as prescribed.Stop date 10/1/18. Continue weekly labs and PICC care while on IV antibiotics. Patient will need to remain at skilled nursing for duration of IV antibiotics due to history of methamphetamine use. DC PICC after last dose on 10/1/18  Patient will need to be off antibiotics for 2-4 weeks prior to joint aspiration/culture and stage II revision  Follow-up with Dr. Boston as scheduled  Follow up: 2 weeks, RTC sooner if needed. FU with PCP for ongoing chronic medical conditions.            Please note that this dictation was created using voice recognition software. I have  worked with technical experts from [employer] to optimize the interface.  I have made every reasonable attempt to correct obvious errors, but there may be errors of grammar and possibly content that I did not discover before finalizing the note.

## 2018-09-19 LAB — VANCOMYCIN TROUGH SERPL-MCNC: 10 UG/ML (ref 10–20)

## 2018-09-19 PROCEDURE — 80202 ASSAY OF VANCOMYCIN: CPT

## 2018-09-20 LAB
ALBUMIN SERPL BCP-MCNC: 4.2 G/DL (ref 3.2–4.9)
ALBUMIN/GLOB SERPL: 1.6 G/DL
ALP SERPL-CCNC: 105 U/L (ref 30–99)
ALT SERPL-CCNC: 27 U/L (ref 2–50)
ANION GAP SERPL CALC-SCNC: 7 MMOL/L (ref 0–11.9)
AST SERPL-CCNC: 20 U/L (ref 12–45)
BASOPHILS # BLD AUTO: 1.6 % (ref 0–1.8)
BASOPHILS # BLD: 0.08 K/UL (ref 0–0.12)
BILIRUB SERPL-MCNC: 0.3 MG/DL (ref 0.1–1.5)
BUN SERPL-MCNC: 21 MG/DL (ref 8–22)
CALCIUM SERPL-MCNC: 9.2 MG/DL (ref 8.5–10.5)
CHLORIDE SERPL-SCNC: 105 MMOL/L (ref 96–112)
CO2 SERPL-SCNC: 28 MMOL/L (ref 20–33)
CREAT SERPL-MCNC: 1.13 MG/DL (ref 0.5–1.4)
CRP SERPL HS-MCNC: 0.32 MG/DL (ref 0–0.75)
EOSINOPHIL # BLD AUTO: 0.8 K/UL (ref 0–0.51)
EOSINOPHIL NFR BLD: 16.1 % (ref 0–6.9)
ERYTHROCYTE [DISTWIDTH] IN BLOOD BY AUTOMATED COUNT: 44.2 FL (ref 35.9–50)
ERYTHROCYTE [SEDIMENTATION RATE] IN BLOOD BY WESTERGREN METHOD: 10 MM/HOUR (ref 0–20)
GLOBULIN SER CALC-MCNC: 2.7 G/DL (ref 1.9–3.5)
GLUCOSE SERPL-MCNC: 100 MG/DL (ref 65–99)
HCT VFR BLD AUTO: 40.6 % (ref 42–52)
HGB BLD-MCNC: 13 G/DL (ref 14–18)
IMM GRANULOCYTES # BLD AUTO: 0.03 K/UL (ref 0–0.11)
IMM GRANULOCYTES NFR BLD AUTO: 0.6 % (ref 0–0.9)
LYMPHOCYTES # BLD AUTO: 1.68 K/UL (ref 1–4.8)
LYMPHOCYTES NFR BLD: 33.7 % (ref 22–41)
MCH RBC QN AUTO: 28.1 PG (ref 27–33)
MCHC RBC AUTO-ENTMCNC: 32 G/DL (ref 33.7–35.3)
MCV RBC AUTO: 87.7 FL (ref 81.4–97.8)
MONOCYTES # BLD AUTO: 0.65 K/UL (ref 0–0.85)
MONOCYTES NFR BLD AUTO: 13.1 % (ref 0–13.4)
NEUTROPHILS # BLD AUTO: 1.74 K/UL (ref 1.82–7.42)
NEUTROPHILS NFR BLD: 34.9 % (ref 44–72)
NRBC # BLD AUTO: 0 K/UL
NRBC BLD-RTO: 0 /100 WBC
PLATELET # BLD AUTO: 271 K/UL (ref 164–446)
PMV BLD AUTO: 8.8 FL (ref 9–12.9)
POTASSIUM SERPL-SCNC: 4.7 MMOL/L (ref 3.6–5.5)
PROT SERPL-MCNC: 6.9 G/DL (ref 6–8.2)
RBC # BLD AUTO: 4.63 M/UL (ref 4.7–6.1)
SODIUM SERPL-SCNC: 140 MMOL/L (ref 135–145)
WBC # BLD AUTO: 5 K/UL (ref 4.8–10.8)

## 2018-09-20 PROCEDURE — 86140 C-REACTIVE PROTEIN: CPT

## 2018-09-20 PROCEDURE — 80053 COMPREHEN METABOLIC PANEL: CPT

## 2018-09-20 PROCEDURE — 36415 COLL VENOUS BLD VENIPUNCTURE: CPT

## 2018-09-20 PROCEDURE — 85025 COMPLETE CBC W/AUTO DIFF WBC: CPT

## 2018-09-20 PROCEDURE — 85652 RBC SED RATE AUTOMATED: CPT

## 2018-09-21 PROBLEM — R94.8: Status: ACTIVE | Noted: 2018-09-21

## 2018-09-21 LAB
ANION GAP SERPL CALC-SCNC: 9 MMOL/L (ref 0–11.9)
BUN SERPL-MCNC: 17 MG/DL (ref 8–22)
CALCIUM SERPL-MCNC: 9 MG/DL (ref 8.5–10.5)
CHLORIDE SERPL-SCNC: 105 MMOL/L (ref 96–112)
CO2 SERPL-SCNC: 26 MMOL/L (ref 20–33)
CREAT SERPL-MCNC: 1 MG/DL (ref 0.5–1.4)
GLUCOSE SERPL-MCNC: 90 MG/DL (ref 65–99)
POTASSIUM SERPL-SCNC: 4.6 MMOL/L (ref 3.6–5.5)
SODIUM SERPL-SCNC: 140 MMOL/L (ref 135–145)
VANCOMYCIN TROUGH SERPL-MCNC: 41.7 UG/ML (ref 10–20)

## 2018-09-21 PROCEDURE — 80202 ASSAY OF VANCOMYCIN: CPT

## 2018-09-21 PROCEDURE — 80048 BASIC METABOLIC PNL TOTAL CA: CPT

## 2018-09-22 LAB
ANION GAP SERPL CALC-SCNC: 9 MMOL/L (ref 0–11.9)
BUN SERPL-MCNC: 17 MG/DL (ref 8–22)
CALCIUM SERPL-MCNC: 8.8 MG/DL (ref 8.5–10.5)
CHLORIDE SERPL-SCNC: 107 MMOL/L (ref 96–112)
CO2 SERPL-SCNC: 24 MMOL/L (ref 20–33)
CREAT SERPL-MCNC: 1.02 MG/DL (ref 0.5–1.4)
GLUCOSE SERPL-MCNC: 134 MG/DL (ref 65–99)
POTASSIUM SERPL-SCNC: 4.2 MMOL/L (ref 3.6–5.5)
SODIUM SERPL-SCNC: 140 MMOL/L (ref 135–145)
VANCOMYCIN TROUGH SERPL-MCNC: 6.3 UG/ML (ref 10–20)

## 2018-09-22 PROCEDURE — 80202 ASSAY OF VANCOMYCIN: CPT

## 2018-09-22 PROCEDURE — 36415 COLL VENOUS BLD VENIPUNCTURE: CPT

## 2018-09-22 PROCEDURE — 80048 BASIC METABOLIC PNL TOTAL CA: CPT

## 2018-09-24 LAB
ANION GAP SERPL CALC-SCNC: 8 MMOL/L (ref 0–11.9)
BUN SERPL-MCNC: 14 MG/DL (ref 8–22)
CALCIUM SERPL-MCNC: 9.2 MG/DL (ref 8.5–10.5)
CHLORIDE SERPL-SCNC: 105 MMOL/L (ref 96–112)
CO2 SERPL-SCNC: 27 MMOL/L (ref 20–33)
CREAT SERPL-MCNC: 0.98 MG/DL (ref 0.5–1.4)
GLUCOSE SERPL-MCNC: 148 MG/DL (ref 65–99)
POTASSIUM SERPL-SCNC: 4.2 MMOL/L (ref 3.6–5.5)
SODIUM SERPL-SCNC: 140 MMOL/L (ref 135–145)
VANCOMYCIN TROUGH SERPL-MCNC: 5.9 UG/ML (ref 10–20)

## 2018-09-24 PROCEDURE — 80202 ASSAY OF VANCOMYCIN: CPT

## 2018-09-24 PROCEDURE — 80048 BASIC METABOLIC PNL TOTAL CA: CPT

## 2018-09-26 LAB
ANION GAP SERPL CALC-SCNC: 8 MMOL/L (ref 0–11.9)
BUN SERPL-MCNC: 19 MG/DL (ref 8–22)
CALCIUM SERPL-MCNC: 8.7 MG/DL (ref 8.5–10.5)
CHLORIDE SERPL-SCNC: 107 MMOL/L (ref 96–112)
CO2 SERPL-SCNC: 24 MMOL/L (ref 20–33)
CREAT SERPL-MCNC: 1.04 MG/DL (ref 0.5–1.4)
GLUCOSE SERPL-MCNC: 140 MG/DL (ref 65–99)
POTASSIUM SERPL-SCNC: 4.2 MMOL/L (ref 3.6–5.5)
SODIUM SERPL-SCNC: 139 MMOL/L (ref 135–145)
VANCOMYCIN TROUGH SERPL-MCNC: 8.5 UG/ML (ref 10–20)

## 2018-09-26 PROCEDURE — 80048 BASIC METABOLIC PNL TOTAL CA: CPT

## 2018-09-26 PROCEDURE — 80202 ASSAY OF VANCOMYCIN: CPT

## 2018-09-27 LAB
CRP SERPL HS-MCNC: 0.27 MG/DL (ref 0–0.75)
ERYTHROCYTE [SEDIMENTATION RATE] IN BLOOD BY WESTERGREN METHOD: 8 MM/HOUR (ref 0–20)

## 2018-09-27 PROCEDURE — 36415 COLL VENOUS BLD VENIPUNCTURE: CPT

## 2018-09-27 PROCEDURE — 86140 C-REACTIVE PROTEIN: CPT

## 2018-09-27 PROCEDURE — 85652 RBC SED RATE AUTOMATED: CPT

## 2018-09-28 ENCOUNTER — HOSPITAL ENCOUNTER (OUTPATIENT)
Dept: LAB | Facility: MEDICAL CENTER | Age: 58
End: 2018-09-28
Attending: PHYSICIAN ASSISTANT
Payer: MEDICAID

## 2018-09-28 LAB
AMBIGUOUS DTTM AMBI4: NORMAL
ANION GAP SERPL CALC-SCNC: 9 MMOL/L (ref 0–11.9)
BUN SERPL-MCNC: 14 MG/DL (ref 8–22)
CALCIUM SERPL-MCNC: 9.2 MG/DL (ref 8.5–10.5)
CHLORIDE SERPL-SCNC: 106 MMOL/L (ref 96–112)
CO2 SERPL-SCNC: 23 MMOL/L (ref 20–33)
CREAT SERPL-MCNC: 1.02 MG/DL (ref 0.5–1.4)
GLUCOSE SERPL-MCNC: 123 MG/DL (ref 65–99)
POTASSIUM SERPL-SCNC: 4.4 MMOL/L (ref 3.6–5.5)
SODIUM SERPL-SCNC: 138 MMOL/L (ref 135–145)
VANCOMYCIN TROUGH SERPL-MCNC: 12.1 UG/ML (ref 10–20)

## 2018-09-28 PROCEDURE — 80202 ASSAY OF VANCOMYCIN: CPT

## 2018-09-28 PROCEDURE — 80048 BASIC METABOLIC PNL TOTAL CA: CPT

## 2018-10-01 ENCOUNTER — HOSPITAL ENCOUNTER (OUTPATIENT)
Dept: LAB | Facility: MEDICAL CENTER | Age: 58
End: 2018-10-01
Attending: INTERNAL MEDICINE
Payer: MEDICAID

## 2018-10-02 ENCOUNTER — PATIENT OUTREACH (OUTPATIENT)
Dept: HEALTH INFORMATION MANAGEMENT | Facility: OTHER | Age: 58
End: 2018-10-02

## 2018-10-09 ENCOUNTER — TELEPHONE (OUTPATIENT)
Dept: INFECTIOUS DISEASES | Facility: MEDICAL CENTER | Age: 58
End: 2018-10-09

## 2018-10-09 ENCOUNTER — DOCUMENTATION (OUTPATIENT)
Dept: INFECTIOUS DISEASES | Facility: MEDICAL CENTER | Age: 58
End: 2018-10-09

## 2018-10-09 NOTE — TELEPHONE ENCOUNTER
Called and unable to LM for pt to return my call to re-schedule a NS follow up appointment. Pt's phone has been EDGAR. FABIO

## 2018-11-02 ENCOUNTER — OFFICE VISIT (OUTPATIENT)
Dept: INTERNAL MEDICINE | Facility: MEDICAL CENTER | Age: 58
End: 2018-11-02
Payer: MEDICAID

## 2018-11-02 VITALS
WEIGHT: 180 LBS | TEMPERATURE: 97.7 F | RESPIRATION RATE: 16 BRPM | HEART RATE: 92 BPM | OXYGEN SATURATION: 97 % | DIASTOLIC BLOOD PRESSURE: 79 MMHG | SYSTOLIC BLOOD PRESSURE: 124 MMHG | HEIGHT: 71 IN | BODY MASS INDEX: 25.2 KG/M2

## 2018-11-02 DIAGNOSIS — Z98.890 S/P KNEE SURGERY: ICD-10-CM

## 2018-11-02 DIAGNOSIS — F39 MOOD DISORDER (HCC): ICD-10-CM

## 2018-11-02 DIAGNOSIS — R73.03 PREDIABETES: ICD-10-CM

## 2018-11-02 DIAGNOSIS — J45.40 MODERATE PERSISTENT ASTHMA WITHOUT COMPLICATION: ICD-10-CM

## 2018-11-02 DIAGNOSIS — F15.10 METHAMPHETAMINE USE (HCC): ICD-10-CM

## 2018-11-02 DIAGNOSIS — Z12.11 COLON CANCER SCREENING: ICD-10-CM

## 2018-11-02 PROBLEM — J45.909 MODERATE ASTHMA WITHOUT COMPLICATION: Status: ACTIVE | Noted: 2018-11-02

## 2018-11-02 PROBLEM — Z23 NEED FOR VACCINATION: Status: ACTIVE | Noted: 2018-11-02

## 2018-11-02 PROCEDURE — 99204 OFFICE O/P NEW MOD 45 MIN: CPT | Mod: GC | Performed by: INTERNAL MEDICINE

## 2018-11-02 RX ORDER — ALBUTEROL SULFATE 90 UG/1
2 AEROSOL, METERED RESPIRATORY (INHALATION) EVERY 6 HOURS PRN
Qty: 8.5 G | Refills: 3 | Status: SHIPPED | OUTPATIENT
Start: 2018-11-02

## 2018-11-02 RX ORDER — METFORMIN HYDROCHLORIDE 500 MG/1
500 TABLET, EXTENDED RELEASE ORAL DAILY
Qty: 90 TAB | Refills: 3 | Status: SHIPPED | OUTPATIENT
Start: 2018-11-02 | End: 2019-07-05

## 2018-11-02 ASSESSMENT — PAIN SCALES - GENERAL: PAINLEVEL: 8=MODERATE-SEVERE PAIN

## 2018-11-02 NOTE — PROGRESS NOTES
"New Patient to Establish    Reason to establish: New patient to establish    CC:   Chief Complaint   Patient presents with   • Annual Exam     establish care         HPI:   Patient is 58-year-old male with history of asthma who is here to establish care and requires refill of his albuterol medication.  He uses medication almost on daily basis when he experiences shortness of breath (mild), wheezing.  And this usually happens especially during exertion when he is exercising.  Last time he used albuterol was today.  Patient states \"I will never been on Symbicort or any related medication\" because \"they cause asthma related deaths\".  Today he denies any chest pain, palpitations, cough    Patient recently had left knee infection and required left knee arthroplasty, and antibiotics (vancomycin and ceftriaxone: Finished course on 10/1/2018).  Patient has orthopedic follow-up in 2 weeks and states he may likely have second surgery to replace his knee caught up.  Today he complains of left knee pain especially on ambulation but states he does not want any pain medication including NSAIDs.    Patient also noted to have A1c of 6.2 in September 2018, and apparently started on metformin long release 500 mg daily.  He is currently tolerating medication without significant side effect.    Patient denies any depression, anxiety.  He does not believe he had mood disorder as found in his chart.  She has history of methamphetamine use, stopped 1.5 years ago.    ROS:  Constitutional: No fever, no chills,  Respiratory: No cough, no hemoptysis,  Cardiovascular: No chest pain, no palpitations, no orthopnea, no PND, no lower extremity swelling  GI  : No nausea, no vomiting, no abdominal pain, no diarrhea, no constipation, no hematochezia  : No dysuria, no urgency, no hesitancy, no nocturia, no frequency, no hematuria  Endocrine: No polyuria, no polydipsia,  Hematology: No easy bruisability, no bleeding  Musculoskeletal: No joint " swelling, no joint redness,  Neuro: No seizures, no numbness, no tingling sensation, no extremity weakness, no change in vision, no hearing impairment  Psychiatry: no depression, no suicidal ideation      Patient Active Problem List    Diagnosis Date Noted   • Moderate asthma without complication 11/02/2018   • Need for vaccination 11/02/2018   • Colon cancer screening s/p conoscopy Sept 2018 11/02/2018   • Prediabetes 11/02/2018   • Abnormal basal metabolic rate (BMR) 09/21/2018   • Normocytic anemia 06/23/2016   • Methamphetamine use ([facility]) 06/22/2016   • Tobacco abuse 11/17/2015   • Back pain 08/04/2014   • Healthcare maintenance 08/04/2014   • S/P knee surgery 08/04/2014   • Mood disorder ([facility]) 08/04/2014       Past Medical History:   Diagnosis Date   • Arthritis     generalized, knees   • Asthma    • Back pain 08/17/2018    neck, knees, 8.5/10   • Breath shortness    • COPD (chronic obstructive pulmonary disease) ([facility])    • Dental disorder     upper partial, doesn't use   • Pneumonia 06/2017   • Urinary bladder disorder     reports frequency       Current Outpatient Prescriptions   Medication Sig Dispense Refill   • metFORMIN ER (GLUCOPHAGE XR) 500 MG TABLET SR 24 HR Take 1 Tab by mouth every day. 90 Tab 3   • albuterol 108 (90 Base) MCG/ACT Aero Soln inhalation aerosol Inhale 2 Puffs by mouth every 6 hours as needed for Shortness of Breath. 8.5 g 3     No current facility-administered medications for this visit.        Allergies as of 11/02/2018   • (No Known Allergies)       Social History     Social History   • Marital status: Single     Spouse name: N/A   • Number of children: N/A   • Years of education: N/A     Occupational History   • Not on file.     Social History Main Topics   • Smoking status: Current Some Day Smoker     Packs/day: 0.75     Types: Cigarettes   • Smokeless tobacco: Never Used      Comment: and using e-cig   • Alcohol use No   • Drug use: No      Comment: Notes prior methamphetamine use,  "Notes previously stopped use, denies current use   • Sexual activity: Yes     Partners: Female      Comment: condom occasionally      Other Topics Concern   •  Service No   • Blood Transfusions No   • Caffeine Concern No   • Occupational Exposure No   • Hobby Hazards No   • Sleep Concern Yes     sometimes    • Stress Concern No   • Weight Concern No   • Special Diet No   • Back Care Yes   • Exercise No   • Bike Helmet No   • Seat Belt Yes   • Self-Exams No     Social History Narrative   • No narrative on file       Family History   Problem Relation Age of Onset   • No Known Problems Mother    • Cancer Father         lung   • No Known Problems Sister    • No Known Problems Brother        Past Surgical History:   Procedure Laterality Date   • KNEE REVISION TOTAL Left 8/21/2018    Procedure: KNEE REVISION TOTAL;  Surgeon: Pramod Boston M.D.;  [facility]: SURGERY [facility];  Service: Orthopedics   • KNEE REPLACEMENT, TOTAL  12/1/2011    [facility]   • HAND SURGERY Right    • KNEE ARTHROSCOPY Left     note 6 total left knee surgeries from 2004 - 2013 in [place], including total knee replacement       /79 (BP Location: Right arm, Patient Position: Sitting, BP Cuff Size: Adult)   Pulse 92   Temp 36.5 °C (97.7 °F) (Temporal)   Resp 16   Ht 1.791 m (5' 10.5\")   Wt 81.6 kg (180 lb)   SpO2 97%   BMI 25.46 kg/m²     Physical Exam  General:  Alert and oriented, No apparent distress.    Eyes: Pupils equal and reactive. No scleral icterus.    Throat: Clear no erythema or exudates noted.    Neck: Supple. No lymphadenopathy noted. Thyroid not enlarged.    Lungs: Clear to auscultation and percussion bilaterally.    Cardiovascular: Regular rate and rhythm. No murmurs, rubs or gallops.    Abdomen:  Benign. No rebound or guarding noted.    Extremities: No clubbing, cyanosis, edema.    Skin: Clear. No rash or suspicious skin lesions noted.    Note: I have reviewed all pertinent labs and diagnostic " tests associated with this visit with specific comments listed under the assessment and plan below    Assessment and Plan    1. Prediabetes  A1c 6.2 in September 2018  Was started on metformin 500 mg daily and will continue  Patient educated on exercise, low sugar diet, low-fat diet    2. Moderate persistent asthma without complication  Patient uses albuterol on almost daily basis, with associated mild shortness of breath and wheezing  No chest pain or palpitations  Lung examination clear  -We will continue albuterol on as needed basis  - Patient declined to take Symbicort, or any related medication because he believes this medication causes asthma related deaths.  Patient was informed this is usually related to beta-2  long-acting agonist like salmeterol alone, but there is no black box warning with respect to steroid/long-acting beta-2 agonist combination.  Patient  however still declined.  The risk of not taking these medication was explained to patient and he seemed to accept the risk.  He was told to call back if he changes his mind especially when symptoms becomes persistent    3. Mood disorder (HCC)  Patient not sure if he has any mood disorder.  No depression or anxiety, no suicidality  We will continue to monitor    4. Methamphetamine use (HCC)  Stopped 1-1/2 years ago, patient was congratulated and advised to remain sober      5.  Left knee infection status post arthroplasty  Patient was discharged on 8/30/2018 , status post IV antibiotics (vancomycin and ceftriaxone), last dose was 10/1/2018  Knee pain better, no worsening pain  - Follow-up orthopedic surgery in 2 weeks    6. Colon cancer screening s/p conoscopy Sept 2018  2 polyps was seen, benign  Next colonoscopy in 3-5 years      Followup: Return in about 3 months (around 2/2/2019).    Risk Assessment (discuss potential complications a function of chronic problems): yes    Complexity (discuss number of co-morbidities): 5    Signed by: Elier Jenkins,  M.D.

## 2018-12-18 DIAGNOSIS — Z01.812 PRE-OPERATIVE LABORATORY EXAMINATION: ICD-10-CM

## 2018-12-18 PROCEDURE — 87640 STAPH A DNA AMP PROBE: CPT

## 2018-12-18 PROCEDURE — 87641 MR-STAPH DNA AMP PROBE: CPT

## 2018-12-18 PROCEDURE — 85730 THROMBOPLASTIN TIME PARTIAL: CPT

## 2018-12-18 PROCEDURE — 36415 COLL VENOUS BLD VENIPUNCTURE: CPT

## 2018-12-18 PROCEDURE — 80048 BASIC METABOLIC PNL TOTAL CA: CPT

## 2018-12-18 PROCEDURE — 85027 COMPLETE CBC AUTOMATED: CPT

## 2018-12-18 PROCEDURE — 85610 PROTHROMBIN TIME: CPT

## 2018-12-18 NOTE — DISCHARGE PLANNING
"DISCHARGE PLANNING NOTE - TOTAL JOINT     Procedure: Procedure(s):  KNEE REVISION TOTAL  Procedure Date: 12/27/2018  Insurance:  Payor: MEDICAID [place]FS / Plan: [place] MEDICAID / Product Type: *No Product type* /   Equipment currently available at home? cane  Steps into the home? N/a  See below  Steps within the home? N/a  See below  Toilet height? N/a  See below  Type of shower? n/a  See below  Who will be with you during your recovery? n/a see beIow  Outpatient Physical Therapy set up after surgery? See below  Did you take the Total Joint Class and where? See below     Plan: Met with patient during preadmission appointment.  Quiet Hours form given and reviewed.       Patient states this is his 6th surgery on this knee.  Patient is known for non-compliance of care [facility] and [facility] not [facility] or any needed equipment.  He states he has always stayed Inpatient until transferred to rehab, then he gets his equipment and books a \"place\" for a month [facility].  Not sure that this is the best plan for him as he is a recurrent admit, however he feels it is.  He wasn't sure he wanted a FWW at this time and so did not sign the DME as that is what was ordered.      "

## 2018-12-18 NOTE — OR NURSING
"Pre-admit appointment completed. \"Preparing for your procedure\" sheet given to pt along with verbal and written instructions. Pt instructed to continue regularly prescribed medications through the day before surgery. Pt instructed to take the following medications the day of surgery with a sip of water, per anesthesia protocol; if needed- albuterol MDI.    Pt to bring MDI DOS.    Pt states he was told he is diabetic but he does not want to take his med. Instructed pt on importance of continuing metformin but not sure pt will resume med. States he quit taking metformin about 4 days ago. LM for Tatyana FROST to notify of such.   "

## 2018-12-19 LAB
ANION GAP SERPL CALC-SCNC: 8 MMOL/L (ref 0–11.9)
APTT PPP: 32.1 SEC (ref 24.7–36)
BUN SERPL-MCNC: 17 MG/DL (ref 8–22)
CALCIUM SERPL-MCNC: 9.7 MG/DL (ref 8.5–10.5)
CHLORIDE SERPL-SCNC: 105 MMOL/L (ref 96–112)
CO2 SERPL-SCNC: 24 MMOL/L (ref 20–33)
CREAT SERPL-MCNC: 0.97 MG/DL (ref 0.5–1.4)
ERYTHROCYTE [DISTWIDTH] IN BLOOD BY AUTOMATED COUNT: 44.9 FL (ref 35.9–50)
GLUCOSE SERPL-MCNC: 115 MG/DL (ref 65–99)
HCT VFR BLD AUTO: 46.8 % (ref 42–52)
HGB BLD-MCNC: 14.6 G/DL (ref 14–18)
INR PPP: 1.03 (ref 0.87–1.13)
MCH RBC QN AUTO: 27.9 PG (ref 27–33)
MCHC RBC AUTO-ENTMCNC: 31.2 G/DL (ref 33.7–35.3)
MCV RBC AUTO: 89.5 FL (ref 81.4–97.8)
PLATELET # BLD AUTO: 446 K/UL (ref 164–446)
PMV BLD AUTO: 9.3 FL (ref 9–12.9)
POTASSIUM SERPL-SCNC: 4.8 MMOL/L (ref 3.6–5.5)
PROTHROMBIN TIME: 13.6 SEC (ref 12–14.6)
RBC # BLD AUTO: 5.23 M/UL (ref 4.7–6.1)
SODIUM SERPL-SCNC: 137 MMOL/L (ref 135–145)
WBC # BLD AUTO: 8.6 K/UL (ref 4.8–10.8)

## 2018-12-20 LAB
SCCMEC + MECA PNL NOSE NAA+PROBE: NEGATIVE
SCCMEC + MECA PNL NOSE NAA+PROBE: POSITIVE

## 2018-12-27 ENCOUNTER — APPOINTMENT (OUTPATIENT)
Dept: RADIOLOGY | Facility: MEDICAL CENTER | Age: 58
DRG: 468 | End: 2018-12-27
Attending: ORTHOPAEDIC SURGERY
Payer: MEDICAID

## 2018-12-27 ENCOUNTER — HOSPITAL ENCOUNTER (INPATIENT)
Facility: MEDICAL CENTER | Age: 58
LOS: 20 days | DRG: 468 | End: 2019-01-16
Attending: ORTHOPAEDIC SURGERY | Admitting: ORTHOPAEDIC SURGERY
Payer: MEDICAID

## 2018-12-27 DIAGNOSIS — T84.54XD INFECTION AND INFLAMMATORY REACTION DUE TO INTERNAL LEFT KNEE PROSTHESIS, SUBSEQUENT ENCOUNTER: ICD-10-CM

## 2018-12-27 LAB — GLUCOSE BLD-MCNC: 123 MG/DL (ref 65–99)

## 2018-12-27 PROCEDURE — 160035 HCHG PACU - 1ST 60 MINS PHASE I: Performed by: ORTHOPAEDIC SURGERY

## 2018-12-27 PROCEDURE — 87205 SMEAR GRAM STAIN: CPT | Mod: 91

## 2018-12-27 PROCEDURE — C1776 JOINT DEVICE (IMPLANTABLE): HCPCS | Performed by: ORTHOPAEDIC SURGERY

## 2018-12-27 PROCEDURE — 94760 N-INVAS EAR/PLS OXIMETRY 1: CPT

## 2018-12-27 PROCEDURE — 502579 HCHG PACK, TOTAL KNEE: Performed by: ORTHOPAEDIC SURGERY

## 2018-12-27 PROCEDURE — 160022 HCHG BLOCK: Performed by: ORTHOPAEDIC SURGERY

## 2018-12-27 PROCEDURE — 501838 HCHG SUTURE GENERAL: Performed by: ORTHOPAEDIC SURGERY

## 2018-12-27 PROCEDURE — 700111 HCHG RX REV CODE 636 W/ 250 OVERRIDE (IP): Performed by: ORTHOPAEDIC SURGERY

## 2018-12-27 PROCEDURE — 0SND0ZZ RELEASE LEFT KNEE JOINT, OPEN APPROACH: ICD-10-PCS | Performed by: ORTHOPAEDIC SURGERY

## 2018-12-27 PROCEDURE — L8699 PROSTHETIC IMPLANT NOS: HCPCS | Performed by: ORTHOPAEDIC SURGERY

## 2018-12-27 PROCEDURE — 160036 HCHG PACU - EA ADDL 30 MINS PHASE I: Performed by: ORTHOPAEDIC SURGERY

## 2018-12-27 PROCEDURE — 160002 HCHG RECOVERY MINUTES (STAT): Performed by: ORTHOPAEDIC SURGERY

## 2018-12-27 PROCEDURE — 73560 X-RAY EXAM OF KNEE 1 OR 2: CPT | Mod: LT

## 2018-12-27 PROCEDURE — 160029 HCHG SURGERY MINUTES - 1ST 30 MINS LEVEL 4: Performed by: ORTHOPAEDIC SURGERY

## 2018-12-27 PROCEDURE — 87070 CULTURE OTHR SPECIMN AEROBIC: CPT | Mod: 91

## 2018-12-27 PROCEDURE — 87075 CULTR BACTERIA EXCEPT BLOOD: CPT

## 2018-12-27 PROCEDURE — 700102 HCHG RX REV CODE 250 W/ 637 OVERRIDE(OP): Performed by: ORTHOPAEDIC SURGERY

## 2018-12-27 PROCEDURE — 160041 HCHG SURGERY MINUTES - EA ADDL 1 MIN LEVEL 4: Performed by: ORTHOPAEDIC SURGERY

## 2018-12-27 PROCEDURE — 700105 HCHG RX REV CODE 258: Performed by: ORTHOPAEDIC SURGERY

## 2018-12-27 PROCEDURE — 700101 HCHG RX REV CODE 250: Performed by: ORTHOPAEDIC SURGERY

## 2018-12-27 PROCEDURE — 700105 HCHG RX REV CODE 258

## 2018-12-27 PROCEDURE — 700102 HCHG RX REV CODE 250 W/ 637 OVERRIDE(OP)

## 2018-12-27 PROCEDURE — 0SPD0EZ REMOVAL OF ARTICULATING SPACER FROM LEFT KNEE JOINT, OPEN APPROACH: ICD-10-PCS | Performed by: ORTHOPAEDIC SURGERY

## 2018-12-27 PROCEDURE — 0SRD069 REPLACEMENT OF LEFT KNEE JOINT WITH OXIDIZED ZIRCONIUM ON POLYETHYLENE SYNTHETIC SUBSTITUTE, CEMENTED, OPEN APPROACH: ICD-10-PCS | Performed by: ORTHOPAEDIC SURGERY

## 2018-12-27 PROCEDURE — 87015 SPECIMEN INFECT AGNT CONCNTJ: CPT | Mod: 91

## 2018-12-27 PROCEDURE — 700101 HCHG RX REV CODE 250

## 2018-12-27 PROCEDURE — 700111 HCHG RX REV CODE 636 W/ 250 OVERRIDE (IP)

## 2018-12-27 PROCEDURE — 770001 HCHG ROOM/CARE - MED/SURG/GYN PRIV*

## 2018-12-27 PROCEDURE — A9270 NON-COVERED ITEM OR SERVICE: HCPCS | Performed by: ORTHOPAEDIC SURGERY

## 2018-12-27 PROCEDURE — 700112 HCHG RX REV CODE 229: Performed by: ORTHOPAEDIC SURGERY

## 2018-12-27 PROCEDURE — A9270 NON-COVERED ITEM OR SERVICE: HCPCS

## 2018-12-27 PROCEDURE — 82962 GLUCOSE BLOOD TEST: CPT

## 2018-12-27 PROCEDURE — 160009 HCHG ANES TIME/MIN: Performed by: ORTHOPAEDIC SURGERY

## 2018-12-27 PROCEDURE — 160048 HCHG OR STATISTICAL LEVEL 1-5: Performed by: ORTHOPAEDIC SURGERY

## 2018-12-27 DEVICE — IMPLANTABLE DEVICE: Type: IMPLANTABLE DEVICE | Site: KNEE | Status: FUNCTIONAL

## 2018-12-27 DEVICE — COUPLER OFFSET LEGION 4MM: Type: IMPLANTABLE DEVICE | Site: KNEE | Status: FUNCTIONAL

## 2018-12-27 DEVICE — STEM CEMENT LEGION STRAIGHT 14MMX120MM: Type: IMPLANTABLE DEVICE | Site: KNEE | Status: FUNCTIONAL

## 2018-12-27 DEVICE — CEMENT BONE SIMPLEX W/GENTAICIN (10/PK): Type: IMPLANTABLE DEVICE | Site: KNEE | Status: FUNCTIONAL

## 2018-12-27 DEVICE — PATELLA GII OVAL RESURFACING PAT 41MM (1EA): Type: IMPLANTABLE DEVICE | Site: KNEE | Status: FUNCTIONAL

## 2018-12-27 RX ORDER — DEXTROSE MONOHYDRATE 25 G/50ML
25 INJECTION, SOLUTION INTRAVENOUS
Status: DISCONTINUED | OUTPATIENT
Start: 2018-12-27 | End: 2019-01-16 | Stop reason: HOSPADM

## 2018-12-27 RX ORDER — KETOROLAC TROMETHAMINE 30 MG/ML
30 INJECTION, SOLUTION INTRAMUSCULAR; INTRAVENOUS EVERY 6 HOURS
Status: COMPLETED | OUTPATIENT
Start: 2018-12-27 | End: 2018-12-28

## 2018-12-27 RX ORDER — AMOXICILLIN 250 MG
1 CAPSULE ORAL
Status: DISCONTINUED | OUTPATIENT
Start: 2018-12-27 | End: 2019-01-16 | Stop reason: HOSPADM

## 2018-12-27 RX ORDER — ATORVASTATIN CALCIUM 40 MG/1
40 TABLET, FILM COATED ORAL EVERY EVENING
Status: DISCONTINUED | OUTPATIENT
Start: 2018-12-27 | End: 2019-01-16 | Stop reason: HOSPADM

## 2018-12-27 RX ORDER — ROPIVACAINE HYDROCHLORIDE 5 MG/ML
INJECTION, SOLUTION EPIDURAL; INFILTRATION; PERINEURAL
Status: DISCONTINUED | OUTPATIENT
Start: 2018-12-27 | End: 2018-12-27 | Stop reason: HOSPADM

## 2018-12-27 RX ORDER — CELECOXIB 200 MG/1
200 CAPSULE ORAL 2 TIMES DAILY WITH MEALS
Status: DISPENSED | OUTPATIENT
Start: 2018-12-28 | End: 2019-01-01

## 2018-12-27 RX ORDER — SCOLOPAMINE TRANSDERMAL SYSTEM 1 MG/1
1 PATCH, EXTENDED RELEASE TRANSDERMAL
Status: DISCONTINUED | OUTPATIENT
Start: 2018-12-27 | End: 2019-01-16 | Stop reason: HOSPADM

## 2018-12-27 RX ORDER — HALOPERIDOL 5 MG/ML
1 INJECTION INTRAMUSCULAR EVERY 6 HOURS PRN
Status: DISCONTINUED | OUTPATIENT
Start: 2018-12-27 | End: 2019-01-16 | Stop reason: HOSPADM

## 2018-12-27 RX ORDER — ENEMA 19; 7 G/133ML; G/133ML
1 ENEMA RECTAL
Status: DISCONTINUED | OUTPATIENT
Start: 2018-12-27 | End: 2019-01-16 | Stop reason: HOSPADM

## 2018-12-27 RX ORDER — DEXAMETHASONE SODIUM PHOSPHATE 4 MG/ML
4 INJECTION, SOLUTION INTRA-ARTICULAR; INTRALESIONAL; INTRAMUSCULAR; INTRAVENOUS; SOFT TISSUE ONCE
Status: COMPLETED | OUTPATIENT
Start: 2018-12-28 | End: 2018-12-28

## 2018-12-27 RX ORDER — CELECOXIB 200 MG/1
200 CAPSULE ORAL ONCE
Status: COMPLETED | OUTPATIENT
Start: 2018-12-27 | End: 2018-12-27

## 2018-12-27 RX ORDER — BISACODYL 10 MG
10 SUPPOSITORY, RECTAL RECTAL
Status: DISCONTINUED | OUTPATIENT
Start: 2018-12-27 | End: 2019-01-16 | Stop reason: HOSPADM

## 2018-12-27 RX ORDER — DIPHENHYDRAMINE HCL 25 MG
25 TABLET ORAL NIGHTLY PRN
Status: DISCONTINUED | OUTPATIENT
Start: 2018-12-28 | End: 2019-01-16 | Stop reason: HOSPADM

## 2018-12-27 RX ORDER — DIPHENHYDRAMINE HYDROCHLORIDE 50 MG/ML
25 INJECTION INTRAMUSCULAR; INTRAVENOUS EVERY 6 HOURS PRN
Status: DISCONTINUED | OUTPATIENT
Start: 2018-12-27 | End: 2019-01-16 | Stop reason: HOSPADM

## 2018-12-27 RX ORDER — HYDROMORPHONE HYDROCHLORIDE 1 MG/ML
0.2 INJECTION, SOLUTION INTRAMUSCULAR; INTRAVENOUS; SUBCUTANEOUS
Status: DISCONTINUED | OUTPATIENT
Start: 2018-12-27 | End: 2018-12-27 | Stop reason: HOSPADM

## 2018-12-27 RX ORDER — DIPHENHYDRAMINE HCL 25 MG
25 TABLET ORAL EVERY 6 HOURS PRN
Status: DISCONTINUED | OUTPATIENT
Start: 2018-12-27 | End: 2019-01-16 | Stop reason: HOSPADM

## 2018-12-27 RX ORDER — ACETAMINOPHEN 500 MG
1000 TABLET ORAL ONCE
Status: COMPLETED | OUTPATIENT
Start: 2018-12-27 | End: 2018-12-27

## 2018-12-27 RX ORDER — HYDROMORPHONE HYDROCHLORIDE 1 MG/ML
0.4 INJECTION, SOLUTION INTRAMUSCULAR; INTRAVENOUS; SUBCUTANEOUS
Status: DISCONTINUED | OUTPATIENT
Start: 2018-12-27 | End: 2018-12-27 | Stop reason: HOSPADM

## 2018-12-27 RX ORDER — AMOXICILLIN 250 MG
1 CAPSULE ORAL NIGHTLY
Status: DISCONTINUED | OUTPATIENT
Start: 2018-12-27 | End: 2019-01-16 | Stop reason: HOSPADM

## 2018-12-27 RX ORDER — DOCUSATE SODIUM 100 MG/1
100 CAPSULE, LIQUID FILLED ORAL 2 TIMES DAILY
Status: DISCONTINUED | OUTPATIENT
Start: 2018-12-27 | End: 2019-01-16 | Stop reason: HOSPADM

## 2018-12-27 RX ORDER — SODIUM CHLORIDE, SODIUM LACTATE, POTASSIUM CHLORIDE, CALCIUM CHLORIDE 600; 310; 30; 20 MG/100ML; MG/100ML; MG/100ML; MG/100ML
INJECTION, SOLUTION INTRAVENOUS CONTINUOUS
Status: DISPENSED | OUTPATIENT
Start: 2018-12-27 | End: 2018-12-28

## 2018-12-27 RX ORDER — SODIUM CHLORIDE, SODIUM LACTATE, POTASSIUM CHLORIDE, CALCIUM CHLORIDE 600; 310; 30; 20 MG/100ML; MG/100ML; MG/100ML; MG/100ML
1000 INJECTION, SOLUTION INTRAVENOUS CONTINUOUS
Status: DISCONTINUED | OUTPATIENT
Start: 2018-12-27 | End: 2018-12-30

## 2018-12-27 RX ORDER — POLYETHYLENE GLYCOL 3350 17 G/17G
1 POWDER, FOR SOLUTION ORAL 2 TIMES DAILY PRN
Status: DISCONTINUED | OUTPATIENT
Start: 2018-12-27 | End: 2019-01-16 | Stop reason: HOSPADM

## 2018-12-27 RX ORDER — DIPHENHYDRAMINE HYDROCHLORIDE 50 MG/ML
12.5 INJECTION INTRAMUSCULAR; INTRAVENOUS
Status: DISCONTINUED | OUTPATIENT
Start: 2018-12-27 | End: 2018-12-27 | Stop reason: HOSPADM

## 2018-12-27 RX ORDER — ONDANSETRON 2 MG/ML
4 INJECTION INTRAMUSCULAR; INTRAVENOUS
Status: DISCONTINUED | OUTPATIENT
Start: 2018-12-27 | End: 2018-12-27 | Stop reason: HOSPADM

## 2018-12-27 RX ORDER — TRAMADOL HYDROCHLORIDE 50 MG/1
50 TABLET ORAL EVERY 4 HOURS PRN
Status: DISCONTINUED | OUTPATIENT
Start: 2018-12-27 | End: 2019-01-16 | Stop reason: HOSPADM

## 2018-12-27 RX ORDER — CHLORPROMAZINE HYDROCHLORIDE 25 MG/ML
25 INJECTION INTRAMUSCULAR EVERY 6 HOURS PRN
Status: DISCONTINUED | OUTPATIENT
Start: 2018-12-27 | End: 2019-01-16 | Stop reason: HOSPADM

## 2018-12-27 RX ORDER — HYDROMORPHONE HYDROCHLORIDE 1 MG/ML
0.1 INJECTION, SOLUTION INTRAMUSCULAR; INTRAVENOUS; SUBCUTANEOUS
Status: DISCONTINUED | OUTPATIENT
Start: 2018-12-27 | End: 2018-12-27 | Stop reason: HOSPADM

## 2018-12-27 RX ORDER — HYDRALAZINE HYDROCHLORIDE 20 MG/ML
5 INJECTION INTRAMUSCULAR; INTRAVENOUS
Status: DISCONTINUED | OUTPATIENT
Start: 2018-12-27 | End: 2018-12-27 | Stop reason: HOSPADM

## 2018-12-27 RX ORDER — OXYCODONE HCL 5 MG/5 ML
10 SOLUTION, ORAL ORAL
Status: DISCONTINUED | OUTPATIENT
Start: 2018-12-27 | End: 2018-12-27 | Stop reason: HOSPADM

## 2018-12-27 RX ORDER — KETOROLAC TROMETHAMINE 30 MG/ML
INJECTION, SOLUTION INTRAMUSCULAR; INTRAVENOUS
Status: DISCONTINUED | OUTPATIENT
Start: 2018-12-27 | End: 2018-12-27 | Stop reason: HOSPADM

## 2018-12-27 RX ORDER — OXYCODONE HYDROCHLORIDE 5 MG/1
5 TABLET ORAL
Status: DISCONTINUED | OUTPATIENT
Start: 2018-12-27 | End: 2019-01-16 | Stop reason: HOSPADM

## 2018-12-27 RX ORDER — SODIUM CHLORIDE, SODIUM LACTATE, POTASSIUM CHLORIDE, CALCIUM CHLORIDE 600; 310; 30; 20 MG/100ML; MG/100ML; MG/100ML; MG/100ML
INJECTION, SOLUTION INTRAVENOUS CONTINUOUS
Status: DISCONTINUED | OUTPATIENT
Start: 2018-12-27 | End: 2018-12-27 | Stop reason: HOSPADM

## 2018-12-27 RX ORDER — CHLORPROMAZINE HYDROCHLORIDE 25 MG/1
25 TABLET, FILM COATED ORAL EVERY 6 HOURS PRN
Status: DISCONTINUED | OUTPATIENT
Start: 2018-12-27 | End: 2019-01-16 | Stop reason: HOSPADM

## 2018-12-27 RX ORDER — ONDANSETRON 2 MG/ML
4 INJECTION INTRAMUSCULAR; INTRAVENOUS EVERY 4 HOURS PRN
Status: DISCONTINUED | OUTPATIENT
Start: 2018-12-27 | End: 2019-01-16 | Stop reason: HOSPADM

## 2018-12-27 RX ORDER — OXYCODONE HCL 5 MG/5 ML
5 SOLUTION, ORAL ORAL
Status: DISCONTINUED | OUTPATIENT
Start: 2018-12-27 | End: 2018-12-27 | Stop reason: HOSPADM

## 2018-12-27 RX ORDER — OXYCODONE HYDROCHLORIDE 10 MG/1
10 TABLET ORAL
Status: DISCONTINUED | OUTPATIENT
Start: 2018-12-27 | End: 2019-01-16 | Stop reason: HOSPADM

## 2018-12-27 RX ORDER — METOPROLOL TARTRATE 1 MG/ML
1 INJECTION, SOLUTION INTRAVENOUS
Status: DISCONTINUED | OUTPATIENT
Start: 2018-12-27 | End: 2018-12-27 | Stop reason: HOSPADM

## 2018-12-27 RX ORDER — HALOPERIDOL 5 MG/ML
1 INJECTION INTRAMUSCULAR
Status: DISCONTINUED | OUTPATIENT
Start: 2018-12-27 | End: 2018-12-27 | Stop reason: HOSPADM

## 2018-12-27 RX ORDER — HYDROMORPHONE HYDROCHLORIDE 1 MG/ML
0.5 INJECTION, SOLUTION INTRAMUSCULAR; INTRAVENOUS; SUBCUTANEOUS
Status: DISCONTINUED | OUTPATIENT
Start: 2018-12-27 | End: 2019-01-16 | Stop reason: HOSPADM

## 2018-12-27 RX ORDER — GABAPENTIN 300 MG/1
300 CAPSULE ORAL ONCE
Status: COMPLETED | OUTPATIENT
Start: 2018-12-27 | End: 2018-12-27

## 2018-12-27 RX ORDER — ACETAMINOPHEN 500 MG
1000 TABLET ORAL EVERY 6 HOURS
Status: DISPENSED | OUTPATIENT
Start: 2018-12-27 | End: 2019-01-01

## 2018-12-27 RX ADMIN — CELECOXIB 200 MG: 200 CAPSULE ORAL at 14:03

## 2018-12-27 RX ADMIN — INSULIN HUMAN 5 UNITS: 100 INJECTION, SOLUTION PARENTERAL at 23:41

## 2018-12-27 RX ADMIN — ACETAMINOPHEN 1000 MG: 500 TABLET, FILM COATED ORAL at 23:26

## 2018-12-27 RX ADMIN — SODIUM CHLORIDE, SODIUM LACTATE, POTASSIUM CHLORIDE, CALCIUM CHLORIDE 1000 ML: 600; 310; 30; 20 INJECTION, SOLUTION INTRAVENOUS at 14:14

## 2018-12-27 RX ADMIN — KETOROLAC TROMETHAMINE 30 MG: 30 INJECTION, SOLUTION INTRAMUSCULAR at 19:39

## 2018-12-27 RX ADMIN — GABAPENTIN 300 MG: 300 CAPSULE ORAL at 14:03

## 2018-12-27 RX ADMIN — SODIUM CHLORIDE 2 G: 9 INJECTION, SOLUTION INTRAVENOUS at 23:23

## 2018-12-27 RX ADMIN — STANDARDIZED SENNA CONCENTRATE AND DOCUSATE SODIUM 1 TABLET: 8.6; 5 TABLET, FILM COATED ORAL at 23:26

## 2018-12-27 RX ADMIN — KETOROLAC TROMETHAMINE 30 MG: 30 INJECTION, SOLUTION INTRAMUSCULAR at 23:27

## 2018-12-27 RX ADMIN — DOCUSATE SODIUM 100 MG: 100 CAPSULE, LIQUID FILLED ORAL at 19:38

## 2018-12-27 RX ADMIN — ATORVASTATIN CALCIUM 40 MG: 40 TABLET, FILM COATED ORAL at 19:38

## 2018-12-27 RX ADMIN — ACETAMINOPHEN 1000 MG: 500 TABLET, COATED ORAL at 14:02

## 2018-12-27 RX ADMIN — SODIUM CHLORIDE, POTASSIUM CHLORIDE, SODIUM LACTATE AND CALCIUM CHLORIDE: 600; 310; 30; 20 INJECTION, SOLUTION INTRAVENOUS at 19:39

## 2018-12-27 RX ADMIN — ACETAMINOPHEN 1000 MG: 500 TABLET, FILM COATED ORAL at 19:38

## 2018-12-27 ASSESSMENT — PAIN SCALES - GENERAL
PAINLEVEL_OUTOF10: 8
PAINLEVEL_OUTOF10: 7
PAINLEVEL_OUTOF10: 4
PAINLEVEL_OUTOF10: 8

## 2018-12-28 LAB
ANION GAP SERPL CALC-SCNC: 10 MMOL/L (ref 0–11.9)
BUN SERPL-MCNC: 20 MG/DL (ref 8–22)
CALCIUM SERPL-MCNC: 8.6 MG/DL (ref 8.4–10.2)
CHLORIDE SERPL-SCNC: 104 MMOL/L (ref 96–112)
CO2 SERPL-SCNC: 20 MMOL/L (ref 20–33)
CREAT SERPL-MCNC: 1.15 MG/DL (ref 0.5–1.4)
GLUCOSE BLD-MCNC: 129 MG/DL (ref 65–99)
GLUCOSE BLD-MCNC: 138 MG/DL (ref 65–99)
GLUCOSE BLD-MCNC: 148 MG/DL (ref 65–99)
GLUCOSE BLD-MCNC: 205 MG/DL (ref 65–99)
GLUCOSE BLD-MCNC: 265 MG/DL (ref 65–99)
GLUCOSE SERPL-MCNC: 158 MG/DL (ref 65–99)
GRAM STN SPEC: NORMAL
HCT VFR BLD AUTO: 36.4 % (ref 42–52)
HGB BLD-MCNC: 11.8 G/DL (ref 14–18)
POTASSIUM SERPL-SCNC: 4.9 MMOL/L (ref 3.6–5.5)
SIGNIFICANT IND 70042: NORMAL
SITE SITE: NORMAL
SODIUM SERPL-SCNC: 134 MMOL/L (ref 135–145)
SOURCE SOURCE: NORMAL

## 2018-12-28 PROCEDURE — A9270 NON-COVERED ITEM OR SERVICE: HCPCS | Performed by: ORTHOPAEDIC SURGERY

## 2018-12-28 PROCEDURE — 770001 HCHG ROOM/CARE - MED/SURG/GYN PRIV*

## 2018-12-28 PROCEDURE — G8989 SELF CARE D/C STATUS: HCPCS | Mod: CJ

## 2018-12-28 PROCEDURE — 700102 HCHG RX REV CODE 250 W/ 637 OVERRIDE(OP): Performed by: ORTHOPAEDIC SURGERY

## 2018-12-28 PROCEDURE — 97166 OT EVAL MOD COMPLEX 45 MIN: CPT

## 2018-12-28 PROCEDURE — 36415 COLL VENOUS BLD VENIPUNCTURE: CPT

## 2018-12-28 PROCEDURE — 97162 PT EVAL MOD COMPLEX 30 MIN: CPT

## 2018-12-28 PROCEDURE — 700112 HCHG RX REV CODE 229: Performed by: ORTHOPAEDIC SURGERY

## 2018-12-28 PROCEDURE — 700111 HCHG RX REV CODE 636 W/ 250 OVERRIDE (IP): Performed by: ORTHOPAEDIC SURGERY

## 2018-12-28 PROCEDURE — 97110 THERAPEUTIC EXERCISES: CPT

## 2018-12-28 PROCEDURE — 82962 GLUCOSE BLOOD TEST: CPT | Mod: 91

## 2018-12-28 PROCEDURE — G8979 MOBILITY GOAL STATUS: HCPCS | Mod: CJ

## 2018-12-28 PROCEDURE — 700105 HCHG RX REV CODE 258: Performed by: ORTHOPAEDIC SURGERY

## 2018-12-28 PROCEDURE — 80048 BASIC METABOLIC PNL TOTAL CA: CPT

## 2018-12-28 PROCEDURE — G8988 SELF CARE GOAL STATUS: HCPCS | Mod: CJ

## 2018-12-28 PROCEDURE — 85014 HEMATOCRIT: CPT

## 2018-12-28 PROCEDURE — G8978 MOBILITY CURRENT STATUS: HCPCS | Mod: CJ

## 2018-12-28 PROCEDURE — G8987 SELF CARE CURRENT STATUS: HCPCS | Mod: CJ

## 2018-12-28 PROCEDURE — 85018 HEMOGLOBIN: CPT

## 2018-12-28 RX ORDER — DIAZEPAM 5 MG/1
5 TABLET ORAL EVERY 8 HOURS PRN
Status: DISCONTINUED | OUTPATIENT
Start: 2018-12-28 | End: 2019-01-16 | Stop reason: HOSPADM

## 2018-12-28 RX ORDER — DIPHENHYDRAMINE HCL 25 MG
25 TABLET ORAL NIGHTLY PRN
Status: DISCONTINUED | OUTPATIENT
Start: 2018-12-28 | End: 2018-12-28

## 2018-12-28 RX ADMIN — SODIUM CHLORIDE 2 G: 9 INJECTION, SOLUTION INTRAVENOUS at 13:50

## 2018-12-28 RX ADMIN — DEXAMETHASONE SODIUM PHOSPHATE 4 MG: 4 INJECTION, SOLUTION INTRAMUSCULAR; INTRAVENOUS at 07:07

## 2018-12-28 RX ADMIN — ATORVASTATIN CALCIUM 40 MG: 40 TABLET, FILM COATED ORAL at 17:47

## 2018-12-28 RX ADMIN — KETOROLAC TROMETHAMINE 30 MG: 30 INJECTION, SOLUTION INTRAMUSCULAR at 11:58

## 2018-12-28 RX ADMIN — STANDARDIZED SENNA CONCENTRATE AND DOCUSATE SODIUM 1 TABLET: 8.6; 5 TABLET, FILM COATED ORAL at 21:34

## 2018-12-28 RX ADMIN — OXYCODONE HYDROCHLORIDE 10 MG: 10 TABLET ORAL at 08:08

## 2018-12-28 RX ADMIN — OXYCODONE HYDROCHLORIDE 5 MG: 5 TABLET ORAL at 21:34

## 2018-12-28 RX ADMIN — ACETAMINOPHEN 1000 MG: 500 TABLET, FILM COATED ORAL at 12:04

## 2018-12-28 RX ADMIN — SODIUM CHLORIDE 2 G: 9 INJECTION, SOLUTION INTRAVENOUS at 21:35

## 2018-12-28 RX ADMIN — KETOROLAC TROMETHAMINE 30 MG: 30 INJECTION, SOLUTION INTRAMUSCULAR at 06:59

## 2018-12-28 RX ADMIN — CELECOXIB 200 MG: 200 CAPSULE ORAL at 17:47

## 2018-12-28 RX ADMIN — ACETAMINOPHEN 1000 MG: 500 TABLET, FILM COATED ORAL at 06:59

## 2018-12-28 RX ADMIN — SODIUM CHLORIDE 2 G: 9 INJECTION, SOLUTION INTRAVENOUS at 07:06

## 2018-12-28 RX ADMIN — ASPIRIN 81 MG: 81 TABLET, COATED ORAL at 07:17

## 2018-12-28 RX ADMIN — ASPIRIN 81 MG: 81 TABLET, COATED ORAL at 16:29

## 2018-12-28 RX ADMIN — INSULIN HUMAN 3 UNITS: 100 INJECTION, SOLUTION PARENTERAL at 11:59

## 2018-12-28 RX ADMIN — DOCUSATE SODIUM 100 MG: 100 CAPSULE, LIQUID FILLED ORAL at 06:59

## 2018-12-28 RX ADMIN — DIAZEPAM 5 MG: 5 TABLET ORAL at 16:29

## 2018-12-28 RX ADMIN — ACETAMINOPHEN 1000 MG: 500 TABLET, FILM COATED ORAL at 23:58

## 2018-12-28 ASSESSMENT — PAIN SCALES - GENERAL
PAINLEVEL_OUTOF10: 7
PAINLEVEL_OUTOF10: 3
PAINLEVEL_OUTOF10: 5

## 2018-12-28 ASSESSMENT — COPD QUESTIONNAIRES
DURING THE PAST 4 WEEKS HOW MUCH DID YOU FEEL SHORT OF BREATH: NONE/LITTLE OF THE TIME
DO YOU EVER COUGH UP ANY MUCUS OR PHLEGM?: NO/ONLY WITH OCCASIONAL COLDS OR INFECTIONS
IN THE PAST 12 MONTHS DO YOU DO LESS THAN YOU USED TO BECAUSE OF YOUR BREATHING PROBLEMS: DISAGREE/UNSURE
HAVE YOU SMOKED AT LEAST 100 CIGARETTES IN YOUR ENTIRE LIFE: YES
COPD SCREENING SCORE: 3

## 2018-12-28 ASSESSMENT — COGNITIVE AND FUNCTIONAL STATUS - GENERAL
TOILETING: A LITTLE
MOBILITY SCORE: 23
CLIMB 3 TO 5 STEPS WITH RAILING: A LITTLE
SUGGESTED CMS G CODE MODIFIER DAILY ACTIVITY: CJ
DRESSING REGULAR LOWER BODY CLOTHING: A LITTLE
HELP NEEDED FOR BATHING: A LITTLE
DAILY ACTIVITIY SCORE: 21
DAILY ACTIVITIY SCORE: 22
HELP NEEDED FOR BATHING: A LITTLE
DRESSING REGULAR LOWER BODY CLOTHING: A LITTLE
SUGGESTED CMS G CODE MODIFIER MOBILITY: CI
SUGGESTED CMS G CODE MODIFIER DAILY ACTIVITY: CJ

## 2018-12-28 ASSESSMENT — PATIENT HEALTH QUESTIONNAIRE - PHQ9
2. FEELING DOWN, DEPRESSED, IRRITABLE, OR HOPELESS: NOT AT ALL
SUM OF ALL RESPONSES TO PHQ9 QUESTIONS 1 AND 2: 0
1. LITTLE INTEREST OR PLEASURE IN DOING THINGS: NOT AT ALL

## 2018-12-28 ASSESSMENT — LIFESTYLE VARIABLES
ALCOHOL_USE: NO
EVER_SMOKED: YES

## 2018-12-28 ASSESSMENT — GAIT ASSESSMENTS
GAIT LEVEL OF ASSIST: STAND BY ASSIST
DISTANCE (FEET): 150
ASSISTIVE DEVICE: CRUTCHES
DEVIATION: OTHER (COMMENT)

## 2018-12-28 ASSESSMENT — ACTIVITIES OF DAILY LIVING (ADL): TOILETING: INDEPENDENT

## 2018-12-28 NOTE — THERAPY
"Physical Therapy Evaluation completed.   Bed Mobility:  Supine to Sit: Stand by Assist  Transfers: Sit to Stand: Stand by Assist  Gait: Level Of Assist: Stand by Assist with Crutches  X 150 feet very poor gait pattern     Plan of Care: Will benefit from Physical Therapy 7 times per week  Discharge Recommendations: Equipment: Crutches. Post-acute therapy Discharge to a transitional care facility for continued skilled therapy services.  58 year old male S/P revision of L TKR following infection.Pt had significant loss of motion L knee prior to surgery.Pt has been living in a motel and needs housing following SNF .Acute PT needed for agressive ROM  and hamstring stretches,safe transfers and ambulation with crutches   See \"Rehab Therapy-Acute\" Patient Summary Report for complete documentation.     "

## 2018-12-28 NOTE — OR NURSING
"1825 Pt arouses to voice; pt responds appropriately to RN. Reports pain as 8/10 to L knee but reports pain as baseline at 8/10. Denies nausea; given sips of water. +2 L pedal pulse with pink/warm toes and <3 sec cap refill. Pt able to dorsiflex foot post nerve block. Pillow folded in half under L heel to elevate and straighten knee. Ice pack over L knee dressing; CDI. Pt A+O x 4, responds appropriately to RN. Eyes close when unstimulated. Breathing easy and unlabored.   1840 Pain rated as tolerable at 8/10 to L knee. \"I want you to get me some food\". Pt reports this is his main priority. Tolerating sips of water without nausea. Meets criteria for transfer to [facility].   "

## 2018-12-28 NOTE — CARE PLAN
Problem: Safety  Goal: Will remain free from injury  Outcome: PROGRESSING AS EXPECTED  Call light in reach, bed in lowest and locked position.

## 2018-12-28 NOTE — FLOWSHEET NOTE
12/27/18 1925   Protocol Pathways   Protocol Pathways Yes   Incentive Spirometer Instruct   Predicted (ml) 3250   60% (ml) 1950   40% (ml) 1300   Actual (ml) 2750

## 2018-12-28 NOTE — CARE PLAN
Problem: Communication  Goal: The ability to communicate needs accurately and effectively will improve  Outcome: PROGRESSING AS EXPECTED  Pt educated on call lights, calls appropriately, makes needs known. Appropriate.

## 2018-12-28 NOTE — THERAPY
"Occupational Therapy Evaluation completed.   Functional Status: Pt is a 59 y/o male, admit for a L TKA. Pt lives alone, will be going to a SNF to rehab after d/c. Pt PLOF- I with AMB ADLS with the use of a device. Pt presents with minimal c/o pain with ADLS and functional transfers, is impulsive with movement, requires Supervision to SBA to complete ADLS and functional transfers. Pt was educated regarding techniques for ADLS and functional mobility, post surgery. Pt will be seen for initial evaluation and treatment only, as he is functional in this setting.  Plan of Care: Patient with no further skilled OT needs in the acute care setting at this time  Discharge Recommendations:  Equipment: No Equipment Needed. Post-acute therapy Discharge to REHAB SETTING    See \"Rehab Therapy-Acute\" Patient Summary Report for complete documentation.    "

## 2018-12-28 NOTE — OR NURSING
1725: To PACU from OR via bed, sleeping, respirations spontaneous and non-labored via LMA. Icepack applied over c/d/i left knee surgical dressings.  Left lower extremity pink and cool, +2 pedal pulses.    1825: Patient arouses to voice.  Patient able to wiggle toes on left lower extremity.  Patient falls back asleep shortly after arousal.  Handoff report to BENY Chanel.

## 2018-12-28 NOTE — THERAPY
"Physical Therapy Treatment completed.   Bed Mobility:  Supine to Sit: Stand by Assist  Transfers: Sit to Stand: Stand by Assist  Gait: Level Of Assist: No gait in PM.  Treatment focussed on knee extension.  Pt lethargic at time of visit.       Plan of Care: Will benefit from Physical Therapy 7 times per week, BID  Discharge Recommendations: Equipment: Crutches. Post-acute therapy Discharge to a transitional care facility for continued skilled therapy services.     See \"Rehab Therapy-Acute\" Patient Summary Report for complete documentation.   Pt sleeping in sidelying fetal position with knees pulled to chest when PT arrived.  PT educated pt on need to stay on his back with legs in extension and heel propped.  Pt positioned in heel prop stretch x 8 minutes.  Hamstring spasms noted, but spasms decreased with gentle over pressure to promote extension stretch.  Pt demonstrated hamstring spasms with quad sets and with ankle pumps.  Pt UA to actively contract quads with SAQ.  Pt required max assist with SAQ and with SLR.  No knee flex exercises performed secondary to patient resting with knees flexed prior to PT arrival.  Pt positioned in supine with heel propped and instructed to stay in this position to promote knee extension.  Pt verbalized understanding.  PT spoke with nursing and informed nursing of muscle spasms and patient resting with knees flexed.  Nursing will cont to monitor pt for proper positioning in bed. Pt stated he has no strength in his leg, and he can't understand why he has no strength.  Educated pt on POC.  Pt presents with weakness and decreased ROM.  Pt demonstrating hamstring spasms.  Nursing notified.  Pt would benefit from cont skilled PT in acute care setting to increase ROM, train in HEP and improve gait.     "

## 2018-12-28 NOTE — CARE PLAN
Problem: Safety  Goal: Will remain free from injury  Outcome: PROGRESSING AS EXPECTED  Utilizes safety tools appropriately, calls appropriately, educated on use of crutches.

## 2018-12-28 NOTE — OP REPORT
Preop Diagnosis: Left TKA infection, s/p resection with articulating antibiotic implant.  Flexion contracture with arthrofibrosis.  Postop Diagnosis:  Same   Procedure: Revision left total knee arthroplasty  Surgeon: Dr. Pramod Boston MD  Assistant: Shannon Hopson PA-C  Anesthesia: Dr. Jaimes.  General + Adductor canal block  Estimated Blood Loss: 50cc  Drains: None  Complications: None    Implants removed: Harley and nephew size 6 femur and size 5-6 x 18 mm tibia    Implants placed: Harley and nephew size 7 femur with a 4 mm posterior offset, 10 mm posterior augments, and a 160 x 22 mm press-fit stem.  The tibia was a size 6 with a 120 mm x 14 mm cemented stem.  The insert was 15 mm constrained.  The patella was a 41 mm oval component.    Indications: He is a 58-year-old male who presented back in August with an infected left revision total knee arthroplasty.  This was based on elevated inflammatory markers as well as cell count and differential.  His cultures were negative from that resection.  He completed a course of IV antibiotics.  His knee has remained asymptomatic off of antibiotics and his inflammatory markers have returned return to normal.  Attempted aspiration resulted in a dry tap.  He had gone on to develop arthrofibrosis with a significant flexion contracture.  His range of motion today was about 45-90 degrees.  We discussed the treatment options and he was indicated for revision total knee arthroplasty.  We discussed the risk of persistent or recurrent infection, persistent or recurrent flexion contracture and stiffness, persistent pain, loosening, bleeding, transfusion, periprosthetic fracture, potential need for reoperation or revision, blood clots, and other medical problems around time of surgery.  He elected to proceed.     Findings: No purulence.  3 tissue cultures were sent    He was identified in the preoperative holding area and informed consent and site marking were confirmed.  He was brought  back to the operating room where a general anesthesia was performed.  He already received a adductor canal block in the preoperative area with Dr. Jaimes.  He is prepped and draped in routine sterile fashion.  Preprocedure pause was conducted to confirm we had the correct patient, side, site, presence of necessary personnel and equipment.  He was given IV Ancef as well as tranexamic acid and then the tourniquet was inflated.  His previous incision was excised and a medial parapatellar arthrotomy performed.  He had a small nonpurulent effusion with thick arthrofibrosis throughout the joint.  A synovectomy was performed with a medial release to allow for exposure.  An osteotome was used to remove the posterior from the tibial component, and then the femur was disimpacted without any additional bone loss.  His condyles were in good condition with intact collateral ligaments.  The exposure was then extended into the gutters which were very scarred down.  With freeing these up as able to expose the tibia.  The tibia was then disimpacted without any additional bone loss.  A small central defect.  I performed a synovectomy of the posterior knee as well as an extensive release of his posterior capsule given his significant flexion contracture.  The tibia was exposed and cut perpendicular to its long axis using an extra medullary guide.  The femoral and tibial canals were sequentially reamed.  The tibia was reamed to a 16 mm diameter.  The femur was reamed to 22 mm.  The tibia was prepped for a size 6 tibia.  The keel punch had a good purchase in the proximal bone, and given the relatively small proximal defect, I did not feel as though metaphyseal fixation was necessary.  Attention was turned to the femur.  The distal femur cut was freshened up using an intramedullary guide.  Chamfer cuts were made to prepare for a size 7 femur.  His spacer had been a size 6, but given his flexion contracture wanted to raise the joint line,  and posterior eyes the femur.  To accomplish this a size 7 femur required 10 mm posterior augments.  No distal augments were utilized.  With trials in place a 15 mm poly-was inserted.  This restored equal flexion and extension gaps quite nicely.  There is some slight coronal laxity in mid flexion, so I elected to utilize a constrained insert.  The patella was exposed and freed up from scar tissue and excess synovium.  It was prepared for a 41 mm oval patella.  It was trialed and tracked centrally with no thumb technique.  Trials removed and the bone ends were thoroughly irrigated.  The tibia was cemented in place using antibiotic impregnated cement.  At canal restrictor was utilized.  The tibia was held in place until the cement had fully hardened.  3 additional batches were then mixed and the femur and patellar components were cemented into place and held in extension until all cement had fully hardened.  The tourniquet was let down and hemostasis was ensured.  All excess cement was meticulously removed.  I re-trialed with a 15 mm poly-was very happy wit that.  His knee easily came out to full extension and flex 240 degrees with the arthrotomy closed.  The periarticular tissues were injected with a local anesthetic cocktail.  2 g of vancomycin powder was left in the wound.  His arthrotomy was closed with running Quill suture.  The skin was closed in layers with Monocryl and Dermabond on the skin.  An occlusive silver dressing was placed and he was turned over to anesthesia in stable condition without any apparent intraoperative complications.    Plan:  WBAT, PT/OT evaluation, Aspirin 81mg BID for 4 weeks for DVT prophylaxis, Will start him on Celebrex and Lipitor as there is some evidence this may help h arthrofibrosis.  Leave dressing in place until followup.  Continue IV antibiotics pending OR cultures.

## 2018-12-29 LAB — GLUCOSE BLD-MCNC: 135 MG/DL (ref 65–99)

## 2018-12-29 PROCEDURE — 700102 HCHG RX REV CODE 250 W/ 637 OVERRIDE(OP): Performed by: ORTHOPAEDIC SURGERY

## 2018-12-29 PROCEDURE — 770001 HCHG ROOM/CARE - MED/SURG/GYN PRIV*

## 2018-12-29 PROCEDURE — A9270 NON-COVERED ITEM OR SERVICE: HCPCS | Performed by: ORTHOPAEDIC SURGERY

## 2018-12-29 PROCEDURE — 700105 HCHG RX REV CODE 258: Performed by: ORTHOPAEDIC SURGERY

## 2018-12-29 PROCEDURE — 700111 HCHG RX REV CODE 636 W/ 250 OVERRIDE (IP): Performed by: ORTHOPAEDIC SURGERY

## 2018-12-29 PROCEDURE — 82962 GLUCOSE BLOOD TEST: CPT

## 2018-12-29 RX ADMIN — ACETAMINOPHEN 1000 MG: 500 TABLET, FILM COATED ORAL at 23:00

## 2018-12-29 RX ADMIN — SODIUM CHLORIDE 2 G: 9 INJECTION, SOLUTION INTRAVENOUS at 14:48

## 2018-12-29 RX ADMIN — SODIUM CHLORIDE 2 G: 9 INJECTION, SOLUTION INTRAVENOUS at 23:01

## 2018-12-29 RX ADMIN — SODIUM CHLORIDE 2 G: 9 INJECTION, SOLUTION INTRAVENOUS at 07:35

## 2018-12-29 RX ADMIN — OXYCODONE HYDROCHLORIDE 10 MG: 10 TABLET ORAL at 19:57

## 2018-12-29 RX ADMIN — ONDANSETRON 4 MG: 2 INJECTION INTRAMUSCULAR; INTRAVENOUS at 06:03

## 2018-12-29 RX ADMIN — HYDROMORPHONE HYDROCHLORIDE 0.5 MG: 1 INJECTION, SOLUTION INTRAMUSCULAR; INTRAVENOUS; SUBCUTANEOUS at 23:04

## 2018-12-29 RX ADMIN — HYDROMORPHONE HYDROCHLORIDE 0.5 MG: 1 INJECTION, SOLUTION INTRAMUSCULAR; INTRAVENOUS; SUBCUTANEOUS at 05:54

## 2018-12-29 ASSESSMENT — PAIN SCALES - GENERAL
PAINLEVEL_OUTOF10: 10
PAINLEVEL_OUTOF10: 4
PAINLEVEL_OUTOF10: 10
PAINLEVEL_OUTOF10: 3

## 2018-12-29 NOTE — PROGRESS NOTES
" Subjective:      Having pain overnight, but not wanting to take any medication for it.  He's having a hard time keeping the knee straight, laying on his side most of the time with the knees flexed up despite nursing reminders.    Objective:  Blood pressure 156/68, pulse 78, temperature 37 °C (98.6 °F), temperature source Oral, resp. rate 18, height 1.829 m (6' 0.01\"), weight 82.6 kg (182 lb 1.6 oz), SpO2 98 %.    Recent Labs      12/28/18   0455   HEMOGLOBIN  11.8*   HEMATOCRIT  36.4*     Recent Labs      12/28/18   0455   SODIUM  134*   POTASSIUM  4.9   CHLORIDE  104   CO2  20   GLUCOSE  158*   BUN  20   CREATININE  1.15   CALCIUM  8.6         Intake/Output Summary (Last 24 hours) at 12/29/18 0540  Last data filed at 12/29/18 0000   Gross per 24 hour   Intake              840 ml   Output              775 ml   Net               65 ml       Laying on his side with the knee flexed up.  On his back and with downward pressure I can get him mostly straight this morning.  Neurologically and vascularly intact with palpable pedal pulses bilaterally.  Dressing C/D/I      Assessment:    S/p reimplantation total knee arthroplasty.  History of knee flexion contractures  Cultures no growth    Plan:      Continue to reinforce need to keep knee straight  Will apply a knee immobilizer full extension when in bed or sleeping to assist with straightening  Continue IV abx while awaiting cultures  Diet as tolerated  Mobilize today - WBAT  OT/PT  DVT ppx - ASA BID + Sequential Compression Devices  SNF planning  Follow-up with Dr. Boston' office approximately 2 weeks post-op.    "

## 2018-12-29 NOTE — PROGRESS NOTES
Placed hinged knee brace on pt's left knee, set brace at full hyperextension (-10 degrees). Pt stated it felt comfortable but supportive. If pt has questions regarding the brace or it requires adjustment contact taction at [phone]

## 2018-12-29 NOTE — PROGRESS NOTES
"Pt states this am that he doesn't want to take any of his meds, doesnt want to use the polar ice, and that he is \"tired of everything and I cant keep my leg straight.\" Pt reports that he has been dealing with this for 18 years and he doesn't feel like anything will help. Pt denies any thoughts of harming himself. Alerted Dr. Boston at this time and he is currently speaking with pt.   "

## 2018-12-29 NOTE — THERAPY
12/29/18  Attempted PT treatment.  Pt in 9/10 pain.  Pt stated he could not move without the leg going into spasms.  Pt refused PT.  Nursing notified.   Pt scheduled for surgery tomorrow and will be casted.  sp

## 2018-12-29 NOTE — PROGRESS NOTES
Patient remain non compliant and discouraged throughout day.  Refusing pain medication, use of immobilizer, and ice therapy.

## 2018-12-29 NOTE — PROGRESS NOTES
Received report from day shift RN and assumed care. Pt requested to walk, ambulated in to castillo and back to bed safely. No complaints of pain or SOB at this time. Call light in reach and encouraged to call with any needs.

## 2018-12-30 LAB
BACTERIA TISS AEROBE CULT: NORMAL
GLUCOSE BLD-MCNC: 120 MG/DL (ref 65–99)
GRAM STN SPEC: NORMAL
SIGNIFICANT IND 70042: NORMAL
SITE SITE: NORMAL
SOURCE SOURCE: NORMAL

## 2018-12-30 PROCEDURE — 160022 HCHG BLOCK: Performed by: ORTHOPAEDIC SURGERY

## 2018-12-30 PROCEDURE — 700111 HCHG RX REV CODE 636 W/ 250 OVERRIDE (IP): Performed by: ORTHOPAEDIC SURGERY

## 2018-12-30 PROCEDURE — 700101 HCHG RX REV CODE 250

## 2018-12-30 PROCEDURE — A9270 NON-COVERED ITEM OR SERVICE: HCPCS | Performed by: ORTHOPAEDIC SURGERY

## 2018-12-30 PROCEDURE — 700102 HCHG RX REV CODE 250 W/ 637 OVERRIDE(OP): Performed by: ANESTHESIOLOGY

## 2018-12-30 PROCEDURE — 160002 HCHG RECOVERY MINUTES (STAT): Performed by: ORTHOPAEDIC SURGERY

## 2018-12-30 PROCEDURE — 700105 HCHG RX REV CODE 258: Performed by: ORTHOPAEDIC SURGERY

## 2018-12-30 PROCEDURE — 160009 HCHG ANES TIME/MIN: Performed by: ORTHOPAEDIC SURGERY

## 2018-12-30 PROCEDURE — 2W3MX2Z IMMOBILIZATION OF LEFT LOWER EXTREMITY USING CAST: ICD-10-PCS | Performed by: ORTHOPAEDIC SURGERY

## 2018-12-30 PROCEDURE — 160035 HCHG PACU - 1ST 60 MINS PHASE I: Performed by: ORTHOPAEDIC SURGERY

## 2018-12-30 PROCEDURE — 160048 HCHG OR STATISTICAL LEVEL 1-5: Performed by: ORTHOPAEDIC SURGERY

## 2018-12-30 PROCEDURE — A9270 NON-COVERED ITEM OR SERVICE: HCPCS | Performed by: ANESTHESIOLOGY

## 2018-12-30 PROCEDURE — 770001 HCHG ROOM/CARE - MED/SURG/GYN PRIV*

## 2018-12-30 PROCEDURE — 82962 GLUCOSE BLOOD TEST: CPT

## 2018-12-30 PROCEDURE — 160026 HCHG SURGERY MINUTES - 1ST 30 MINS LEVEL 1: Performed by: ORTHOPAEDIC SURGERY

## 2018-12-30 PROCEDURE — 160037 HCHG SURGERY MINUTES - EA ADDL 1 MIN LEVEL 1: Performed by: ORTHOPAEDIC SURGERY

## 2018-12-30 PROCEDURE — 700102 HCHG RX REV CODE 250 W/ 637 OVERRIDE(OP): Performed by: ORTHOPAEDIC SURGERY

## 2018-12-30 PROCEDURE — 700111 HCHG RX REV CODE 636 W/ 250 OVERRIDE (IP)

## 2018-12-30 RX ORDER — SODIUM CHLORIDE, SODIUM LACTATE, POTASSIUM CHLORIDE, CALCIUM CHLORIDE 600; 310; 30; 20 MG/100ML; MG/100ML; MG/100ML; MG/100ML
INJECTION, SOLUTION INTRAVENOUS CONTINUOUS
Status: DISCONTINUED | OUTPATIENT
Start: 2018-12-30 | End: 2018-12-30 | Stop reason: HOSPADM

## 2018-12-30 RX ORDER — ONDANSETRON 2 MG/ML
4 INJECTION INTRAMUSCULAR; INTRAVENOUS
Status: DISCONTINUED | OUTPATIENT
Start: 2018-12-30 | End: 2018-12-30 | Stop reason: HOSPADM

## 2018-12-30 RX ORDER — OXYCODONE HCL 5 MG/5 ML
10 SOLUTION, ORAL ORAL
Status: COMPLETED | OUTPATIENT
Start: 2018-12-30 | End: 2018-12-30

## 2018-12-30 RX ORDER — HYDROMORPHONE HYDROCHLORIDE 1 MG/ML
0.1 INJECTION, SOLUTION INTRAMUSCULAR; INTRAVENOUS; SUBCUTANEOUS
Status: DISCONTINUED | OUTPATIENT
Start: 2018-12-30 | End: 2018-12-30 | Stop reason: HOSPADM

## 2018-12-30 RX ORDER — SODIUM CHLORIDE, SODIUM LACTATE, POTASSIUM CHLORIDE, CALCIUM CHLORIDE 600; 310; 30; 20 MG/100ML; MG/100ML; MG/100ML; MG/100ML
INJECTION, SOLUTION INTRAVENOUS ONCE
Status: ACTIVE | OUTPATIENT
Start: 2018-12-30 | End: 2018-12-31

## 2018-12-30 RX ORDER — IPRATROPIUM BROMIDE AND ALBUTEROL SULFATE 2.5; .5 MG/3ML; MG/3ML
3 SOLUTION RESPIRATORY (INHALATION)
Status: DISCONTINUED | OUTPATIENT
Start: 2018-12-30 | End: 2018-12-30 | Stop reason: HOSPADM

## 2018-12-30 RX ORDER — HYDROMORPHONE HYDROCHLORIDE 1 MG/ML
0.4 INJECTION, SOLUTION INTRAMUSCULAR; INTRAVENOUS; SUBCUTANEOUS
Status: DISCONTINUED | OUTPATIENT
Start: 2018-12-30 | End: 2018-12-30 | Stop reason: HOSPADM

## 2018-12-30 RX ORDER — HALOPERIDOL 5 MG/ML
1 INJECTION INTRAMUSCULAR
Status: DISCONTINUED | OUTPATIENT
Start: 2018-12-30 | End: 2018-12-30 | Stop reason: HOSPADM

## 2018-12-30 RX ORDER — HYDROMORPHONE HYDROCHLORIDE 1 MG/ML
0.2 INJECTION, SOLUTION INTRAMUSCULAR; INTRAVENOUS; SUBCUTANEOUS
Status: DISCONTINUED | OUTPATIENT
Start: 2018-12-30 | End: 2018-12-30 | Stop reason: HOSPADM

## 2018-12-30 RX ORDER — OXYCODONE HCL 5 MG/5 ML
5 SOLUTION, ORAL ORAL
Status: COMPLETED | OUTPATIENT
Start: 2018-12-30 | End: 2018-12-30

## 2018-12-30 RX ORDER — MEPERIDINE HYDROCHLORIDE 25 MG/ML
12.5 INJECTION INTRAMUSCULAR; INTRAVENOUS; SUBCUTANEOUS
Status: DISCONTINUED | OUTPATIENT
Start: 2018-12-30 | End: 2018-12-30 | Stop reason: HOSPADM

## 2018-12-30 RX ADMIN — SODIUM CHLORIDE 2 G: 9 INJECTION, SOLUTION INTRAVENOUS at 22:22

## 2018-12-30 RX ADMIN — OXYCODONE HYDROCHLORIDE 10 MG: 10 TABLET ORAL at 12:24

## 2018-12-30 RX ADMIN — OXYCODONE HYDROCHLORIDE 10 MG: 5 SOLUTION ORAL at 08:34

## 2018-12-30 RX ADMIN — HYDROMORPHONE HYDROCHLORIDE 0.5 MG: 1 INJECTION, SOLUTION INTRAMUSCULAR; INTRAVENOUS; SUBCUTANEOUS at 02:39

## 2018-12-30 RX ADMIN — ONDANSETRON 4 MG: 2 INJECTION INTRAMUSCULAR; INTRAVENOUS at 02:43

## 2018-12-30 RX ADMIN — OXYCODONE HYDROCHLORIDE 10 MG: 10 TABLET ORAL at 19:44

## 2018-12-30 ASSESSMENT — PAIN SCALES - GENERAL
PAINLEVEL_OUTOF10: 9
PAINLEVEL_OUTOF10: 0
PAINLEVEL_OUTOF10: 7
PAINLEVEL_OUTOF10: 9
PAINLEVEL_OUTOF10: 8
PAINLEVEL_OUTOF10: 8
PAINLEVEL_OUTOF10: 10
PAINLEVEL_OUTOF10: ASSUMED PAIN PRESENT

## 2018-12-30 ASSESSMENT — GAIT ASSESSMENTS
DISTANCE (FEET): 50
ASSISTIVE DEVICE: CRUTCHES
GAIT LEVEL OF ASSIST: CONTACT GUARD ASSIST

## 2018-12-30 NOTE — PROGRESS NOTES
Pt arrived on unit. Pt refusing all medication, IV fluids, and frequent vital signs at this time. Assisted pt to stand at edge of bed.

## 2018-12-30 NOTE — DISCHARGE PLANNING
Received Choice form at [phone]  Agency/Facility Name: [facility]  Referral sent per Choice form @ [phone]

## 2018-12-30 NOTE — OR NURSING
0803: To PACU from OR via bed, sleeping, respirations spontaneous and non-labored via LMA.  Left leg c/d/i cast in place and leg elevated on pillows.  Toes pink and warm.      0811:  LMA DC'd.    0815:  No change.  Anesthesia at bedside to assess pain and nausea.  Patient denies both at this time.    0830:  Patient states pain is 8/10, planned analgesia.  Patient denies nausea.      0835:  Patient states pain is tolerable at this time.  Patients states he wants food.  Patient meets criteria to transfer to floor.  Report to BENY Stewart.

## 2018-12-30 NOTE — PROGRESS NOTES
Received report from day shift RN and assumed care. Pt is a&ox4, complaints of pain; medicated per MAR. No other needs reported at this time, reminded on how to use call light and also that pt will be NPO after midnight for surgery tomorrow. Pt states that he understands. Encouraged to call with any needs.

## 2018-12-30 NOTE — DISCHARGE PLANNING
Anticipated Discharge Disposition: SNF    Action: SW met with pt at bedside regarding SNF choice.  Pt is agreeable to send out a blanket referral.     Pt is scheduled for surgery tomorrow for left long leg cast.     Barriers to Discharge: Insurance (Medicaid FFS) and SNF acceptance.    Plan: F/U on SNF referrals.

## 2018-12-30 NOTE — PROGRESS NOTES
Pt sitting at edge of bed. Pt refusing to get back in bed and have bed alarm in place. Pt states he will not get up without assistance.

## 2018-12-30 NOTE — OR NURSING
0618: [facility] bedside report from BENY Faith and assumed pt care. Pt resting in bed, pain is tolerable, no nausea, awake and alert. Pt brought to PACU for pre-op procedures via bed, tolerated it well.  0628: Dr Boston at bedside, POC discussed and questions answered.  0652: Patient allergies and NPO status verified, home medication reconciliation completed and belongings secured. Patient verbalizes understanding of pain scale, expected course of stay and plan of care. Surgical site verified with patient. IV access established. Sequentials placed on legs.

## 2018-12-30 NOTE — OP REPORT
Preop Diagnosis: Left knee hamstring tightness, history of flexion contracture, s/p recent reimplantation revision TKA  Postop Diagnosis:  Same   Procedure: Left long leg cast  Surgeon: Dr. Pramod Boston MD  Anesthesia: Dr. Jefferson.  General + Sciatic block  Estimated Blood Loss: None  Drains: None  Complications: None    Indications: He is a 58-year-old male who has undergone a 2-stage revision for infected left total knee arthroplasty.  He has had a history of knee flexion contracture in the left knee for many years.  With his articulating spacer in place, he developed a flexion contracture of approximately 45 degrees.  During his revision 2 days ago, were able to get the knee totally straight.  His PACU films show the need to be totally straight, but on the floor, he tended to lay with the knee bent and despite attempts at bracing with an immobilizer, had an extremely hard time getting the knee straight.  Talked about the risk of recurrent flexion contracture and the effect it would have on his gait.  I recommended returning the operating room for placement of a long leg cast to hopefully help prevent a flexion contracture this time around.  Discussed the risk of cast complications, stiffness, recurrent flexion contracture, and he was in agreement with proceeding.  He was neurovascularly intact and there was no wound drainage at all.  Discussed with Dr. Jefferson and we proceeded with a sciatic nerve block to help ameliorate some of the posterior knee pain he would experience following cast placement.    He was identified in the preoperative holding area and informed consent and site marking were confirmed.  A sciatic block had been performed by Dr. Jefferson.  He was then brought back to the OR where anesthesia was performed with paralysis.  He was positioned supine with all bony prominences well padded.  A timeout was performed.  No antibiotics were given.     Once asleep his knee easily came to full extension.  I  proceeded to place a well-padded long leg cast extending from the proximal thigh down to include the foot. He already had experienced a fair amount of swelling to the point where I did not feel as though additional swelling would be significant, but I made particular attention to pad very well around the knee nonetheless.  Padding was also added at the posterior proximal thigh, ankle, and heel.  Fiberglass was applied and allowed to harden and then he was awoken and returned to the recovery area without any apparent complications.  We will plan to leave the cast in place for at least 2 weeks.

## 2018-12-30 NOTE — DISCHARGE PLANNING
Received call from [place] at [facility], they have declined patient as do not accept Medicaid.

## 2018-12-30 NOTE — DISCHARGE PLANNING
Received call from Arnaldo at [facility], they have declined patient due to Medicaid and drug abuse.

## 2018-12-30 NOTE — DISCHARGE PLANNING
Received notice from [facility] they have declined patient as not accepting Medicaid.

## 2018-12-30 NOTE — THERAPY
"Physical Therapy Treatment completed.   Bed Mobility:  Supine to Sit: Modified Independent  Transfers: Sit to Stand: Contact Guard Assist  Gait: Level Of Assist: Contact Guard Assist with Crutches  X 50 feet   Plan of Care: Will benefit from Physical Therapy 5 times per week  Discharge Recommendations: Equipment: Will Continue to Assess for Equipment Needs. Post-acute therapy Discharge to a transitional care facility for continued skilled therapy services.   Pt now has full L leg cast and is unstable with transfers and ambulation secondary to cast  See \"Rehab Therapy-Acute\" Patient Summary Report for complete documentation.       "

## 2018-12-30 NOTE — PROGRESS NOTES
" Subjective:      I put him in an immobilizer yesterday but it just isn't keeping his leg out straight.  His PACU film showed it fully extended postoperatively.  I can force it down, but it will come right back up into 30-40 degrees of flexion and he has a hard time keeping it down.  Cultures remain NGTD.    Objective:    Blood pressure 117/64, pulse (!) 103, temperature 36.6 °C (97.8 °F), temperature source Oral, resp. rate 19, height 1.829 m (6' 0.01\"), weight 82.6 kg (182 lb 1.6 oz), SpO2 91 %.    Recent Labs      12/28/18   0455   HEMOGLOBIN  11.8*   HEMATOCRIT  36.4*     Recent Labs      12/28/18   0455   SODIUM  134*   POTASSIUM  4.9   CHLORIDE  104   CO2  20   GLUCOSE  158*   BUN  20   CREATININE  1.15   CALCIUM  8.6         Intake/Output Summary (Last 24 hours) at 12/30/18 0677  Last data filed at 12/30/18 0440   Gross per 24 hour   Intake              760 ml   Output             1350 ml   Net             -590 ml       Comfortable, no distress  Sitting up with knee bent to 90 degrees.  Can only extend to about 30 degrees on his own.  Neurologically and vascularly intact with palpable pedal pulses bilaterally.  Dressing C/D/I      Assessment:    S/p reimplantation TKA  Hamstring tightness with tendency for recurrent flexion contracture    Plan:      Discussed with him at length.  I'm going to take him back to the OR today for placement of a long leg cast.  Discussed the weight and discomfort that will be assosciated as well as the risk of cast/pressure complications.  He is in agreement with proceeding.  I discussed the role of hamstring release with Dr. Jaramillo, who agreed that casting alone would be the best first step.      Otherwise, continue IV abx while inpatient, culture no growth so far  ASA  PT/OT  SNF planning  "

## 2018-12-31 LAB
GLUCOSE BLD-MCNC: 120 MG/DL (ref 65–99)
GLUCOSE BLD-MCNC: 224 MG/DL (ref 65–99)

## 2018-12-31 PROCEDURE — 700111 HCHG RX REV CODE 636 W/ 250 OVERRIDE (IP): Performed by: ORTHOPAEDIC SURGERY

## 2018-12-31 PROCEDURE — 97116 GAIT TRAINING THERAPY: CPT

## 2018-12-31 PROCEDURE — 82962 GLUCOSE BLOOD TEST: CPT | Mod: 91

## 2018-12-31 PROCEDURE — 700102 HCHG RX REV CODE 250 W/ 637 OVERRIDE(OP): Performed by: ORTHOPAEDIC SURGERY

## 2018-12-31 PROCEDURE — 770001 HCHG ROOM/CARE - MED/SURG/GYN PRIV*

## 2018-12-31 PROCEDURE — A9270 NON-COVERED ITEM OR SERVICE: HCPCS | Performed by: ORTHOPAEDIC SURGERY

## 2018-12-31 PROCEDURE — 97530 THERAPEUTIC ACTIVITIES: CPT

## 2018-12-31 PROCEDURE — 700105 HCHG RX REV CODE 258: Performed by: ORTHOPAEDIC SURGERY

## 2018-12-31 RX ADMIN — SODIUM CHLORIDE 2 G: 9 INJECTION, SOLUTION INTRAVENOUS at 04:58

## 2018-12-31 RX ADMIN — STANDARDIZED SENNA CONCENTRATE AND DOCUSATE SODIUM 1 TABLET: 8.6; 5 TABLET, FILM COATED ORAL at 20:50

## 2018-12-31 RX ADMIN — SODIUM CHLORIDE 2 G: 9 INJECTION, SOLUTION INTRAVENOUS at 20:51

## 2018-12-31 RX ADMIN — OXYCODONE HYDROCHLORIDE 10 MG: 10 TABLET ORAL at 10:44

## 2018-12-31 RX ADMIN — OXYCODONE HYDROCHLORIDE 10 MG: 10 TABLET ORAL at 15:25

## 2018-12-31 RX ADMIN — ACETAMINOPHEN 1000 MG: 500 TABLET, FILM COATED ORAL at 04:58

## 2018-12-31 RX ADMIN — INSULIN HUMAN 3 UNITS: 100 INJECTION, SOLUTION PARENTERAL at 21:05

## 2018-12-31 RX ADMIN — OXYCODONE HYDROCHLORIDE 10 MG: 10 TABLET ORAL at 20:44

## 2018-12-31 RX ADMIN — ASPIRIN 81 MG: 81 TABLET, COATED ORAL at 04:58

## 2018-12-31 RX ADMIN — ACETAMINOPHEN 1000 MG: 500 TABLET, FILM COATED ORAL at 00:08

## 2018-12-31 RX ADMIN — OXYCODONE HYDROCHLORIDE 10 MG: 10 TABLET ORAL at 04:58

## 2018-12-31 RX ADMIN — OXYCODONE HYDROCHLORIDE 10 MG: 10 TABLET ORAL at 00:08

## 2018-12-31 RX ADMIN — ACETAMINOPHEN 1000 MG: 500 TABLET, FILM COATED ORAL at 10:44

## 2018-12-31 RX ADMIN — DIAZEPAM 5 MG: 5 TABLET ORAL at 00:17

## 2018-12-31 RX ADMIN — DIAZEPAM 5 MG: 5 TABLET ORAL at 17:19

## 2018-12-31 ASSESSMENT — GAIT ASSESSMENTS
DISTANCE (FEET): 50
DEVIATION: STEP TO
ASSISTIVE DEVICE: CRUTCHES
GAIT LEVEL OF ASSIST: CONTACT GUARD ASSIST

## 2018-12-31 ASSESSMENT — PAIN SCALES - GENERAL
PAINLEVEL_OUTOF10: 8
PAINLEVEL_OUTOF10: 6
PAINLEVEL_OUTOF10: 8
PAINLEVEL_OUTOF10: 9

## 2018-12-31 NOTE — DISCHARGE PLANNING
Agency/Facility Name: [facility]  Spoke To: Dave  Outcome: Declined, no [facility] beds    Agency/Facility Name: [facility]  Spoke To: Davidson  Outcome: reviewing referral     Agency/Facility Name: [facility]   Spoke To: Monica  Outcome: Declined, no [facility] beds

## 2018-12-31 NOTE — PROGRESS NOTES
Received report from day shift nurse. Assumed pt care.   Pt is A&Ox4, resting comfortably in bed. Pt on r.a. No signs of SOB/respiratory distress.   Pt c/o pain to surgical site/L knee, given Oxycodone 10mg per MAR.  Labs noted, VSS. Assessment completed, long cast to L leg; + pulse, + numbness/tingling to LLE  Encouraged pt to take IV-abx per MAR and for post-op infection prophylaxis - pt agreed. Pt also agreed for this RN to check his blood sugar - BS at 120  Educated pt the importance to call for assistance. Fall precautions in place. Call light and personal belongings within reach.   Continue to monitor.

## 2018-12-31 NOTE — PROGRESS NOTES
" Subjective:      No events.  He's tolerating the cast ok.  No luck from SNFs yet.    Objective:  Blood pressure 144/75, pulse 92, temperature 36.4 °C (97.6 °F), temperature source Oral, resp. rate 18, height 1.829 m (6' 0.01\"), weight 82.6 kg (182 lb 1.6 oz), SpO2 95 %.                Intake/Output Summary (Last 24 hours) at 12/31/18 1253  Last data filed at 12/31/18 0500   Gross per 24 hour   Intake              800 ml   Output              920 ml   Net             -120 ml       Comfortable, no distress  Neurologically and vascularly intact with palpable pedal pulses bilaterally.  Dressing C/D/I      Assessment:    Doing well s/p reimplantation total knee arthroplasty with long leg casting    Plan:      Cont abx awaiting cultures.  Will switch to orals at discharge.  Diet as tolerated  WBAT  OT/PT  DVT ppx - ASA BID + Sequential Compression Devices  SNF planning    Follow-up with Dr. Boston' office approximately 2 weeks post-op.    "

## 2018-12-31 NOTE — CARE PLAN
Problem: Knowledge Deficit  Goal: Knowledge of disease process/condition, treatment plan, diagnostic tests, and medications will improve  Outcome: PROGRESSING AS EXPECTED  Pt is cooperative throughout the night   Pt allowed this RN to check BS as well as giving scheduled medications       Problem: Pain Management  Goal: Pain level will decrease to patient's comfort goal  Outcome: PROGRESSING AS EXPECTED   12/30/18 0803 12/31/18 0008   OTHER   Pain Scale 0 - 10  --  8   Non Verbal Scale  Sleeping;Unlabored Breathing --    Comfort Goal --  Comfort at Rest;Comfort with Movement;Sleep Comfortably   Pt c/o pain & muscle spasm to surgical site/L knee; given Oxycodone 10mg and Valium 5mg per MAR  Will continue to monitor pt for pain around the clock; reminded pt to report to the nurse if pain remains uncontrolled - pt verbalized understanding     Problem: Mobility  Goal: Risk for activity intolerance will decrease  Outcome: PROGRESSING AS EXPECTED  Pt using x2 crutches for ambulation with standby - x1 assist ; pt tolerated well

## 2018-12-31 NOTE — CARE PLAN
Problem: Safety  Goal: Will remain free from injury  Outcome: PROGRESSING AS EXPECTED      Problem: Pain Management  Goal: Pain level will decrease to patient's comfort goal  Outcome: PROGRESSING AS EXPECTED      Problem: Mobility  Goal: Risk for activity intolerance will decrease  Outcome: PROGRESSING AS EXPECTED

## 2019-01-01 LAB
BACTERIA SPEC ANAEROBE CULT: NORMAL
GLUCOSE BLD-MCNC: 126 MG/DL (ref 65–99)
SIGNIFICANT IND 70042: NORMAL
SITE SITE: NORMAL
SOURCE SOURCE: NORMAL

## 2019-01-01 PROCEDURE — 700105 HCHG RX REV CODE 258: Performed by: ORTHOPAEDIC SURGERY

## 2019-01-01 PROCEDURE — A9270 NON-COVERED ITEM OR SERVICE: HCPCS | Performed by: ORTHOPAEDIC SURGERY

## 2019-01-01 PROCEDURE — 700112 HCHG RX REV CODE 229: Performed by: ORTHOPAEDIC SURGERY

## 2019-01-01 PROCEDURE — 700111 HCHG RX REV CODE 636 W/ 250 OVERRIDE (IP): Performed by: ORTHOPAEDIC SURGERY

## 2019-01-01 PROCEDURE — 82962 GLUCOSE BLOOD TEST: CPT

## 2019-01-01 PROCEDURE — 97530 THERAPEUTIC ACTIVITIES: CPT

## 2019-01-01 PROCEDURE — 700102 HCHG RX REV CODE 250 W/ 637 OVERRIDE(OP): Performed by: ORTHOPAEDIC SURGERY

## 2019-01-01 PROCEDURE — 770001 HCHG ROOM/CARE - MED/SURG/GYN PRIV*

## 2019-01-01 PROCEDURE — 97116 GAIT TRAINING THERAPY: CPT

## 2019-01-01 RX ADMIN — OXYCODONE HYDROCHLORIDE 10 MG: 10 TABLET ORAL at 00:33

## 2019-01-01 RX ADMIN — ACETAMINOPHEN 1000 MG: 500 TABLET, FILM COATED ORAL at 00:32

## 2019-01-01 RX ADMIN — ASPIRIN 81 MG: 81 TABLET, COATED ORAL at 15:00

## 2019-01-01 RX ADMIN — BENZOCAINE: 100 GEL TOPICAL at 16:27

## 2019-01-01 RX ADMIN — DOCUSATE SODIUM 100 MG: 100 CAPSULE, LIQUID FILLED ORAL at 06:26

## 2019-01-01 RX ADMIN — DIAZEPAM 5 MG: 5 TABLET ORAL at 07:47

## 2019-01-01 RX ADMIN — OXYCODONE HYDROCHLORIDE 10 MG: 10 TABLET ORAL at 11:04

## 2019-01-01 RX ADMIN — CELECOXIB 200 MG: 200 CAPSULE ORAL at 07:47

## 2019-01-01 RX ADMIN — SODIUM CHLORIDE 2 G: 9 INJECTION, SOLUTION INTRAVENOUS at 06:26

## 2019-01-01 RX ADMIN — ASPIRIN 81 MG: 81 TABLET, COATED ORAL at 04:47

## 2019-01-01 RX ADMIN — ACETAMINOPHEN 1000 MG: 500 TABLET, FILM COATED ORAL at 06:26

## 2019-01-01 RX ADMIN — OXYCODONE HYDROCHLORIDE 10 MG: 10 TABLET ORAL at 04:45

## 2019-01-01 RX ADMIN — SODIUM CHLORIDE 2 G: 9 INJECTION, SOLUTION INTRAVENOUS at 21:35

## 2019-01-01 ASSESSMENT — GAIT ASSESSMENTS
DISTANCE (FEET): 100
GAIT LEVEL OF ASSIST: CONTACT GUARD ASSIST
ASSISTIVE DEVICE: CRUTCHES

## 2019-01-01 ASSESSMENT — PAIN SCALES - GENERAL
PAINLEVEL_OUTOF10: 6
PAINLEVEL_OUTOF10: 5
PAINLEVEL_OUTOF10: 4

## 2019-01-01 NOTE — PROGRESS NOTES
" Subjective:      No adverse events.  Pain controlled.  Tolerating cast ok, though it makes ambulation more difficult.    Objective:  Blood pressure 125/63, pulse (!) 106, temperature 37 °C (98.6 °F), temperature source Oral, resp. rate 18, height 1.829 m (6' 0.01\"), weight 82.6 kg (182 lb 1.6 oz), SpO2 95 %.                Intake/Output Summary (Last 24 hours) at 01/01/19 0602  Last data filed at 01/01/19 0500   Gross per 24 hour   Intake              840 ml   Output              600 ml   Net              240 ml       Comfortable, no distress  Neurologically and vascularly intact with palpable pedal pulses bilaterally.  Dressing C/D/I      Assessment:    Doing well s/p reimplantation total knee arthroplasty.  Long leg cast for flexion contracture    Plan:      Diet as tolerated  Mobilize today - WBAT  OT/PT - OK to utilize a cast boot.  Can't cut the cast or it would slide down the leg  DVT ppx - ASA BID + Sequential Compression Devices  Pending SNF eval  Follow-up with Dr. Boston' office approximately 2 weeks post-op.    "

## 2019-01-01 NOTE — DISCHARGE PLANNING
CRESENCIO spoke with Elizabeth from [facility] and was informed that they do not have any medicaid beds at this time.

## 2019-01-01 NOTE — PROGRESS NOTES
Universal M/L cast shoe with adjustable heel strap provided for patient. Due to the size of cast and additional posterior padding, the onhand XL size cast shoe would not fit because of shoe's closed heel. The cast shoe provided allows space for heel, however there is a slight overhang on the toe edge (approx.1 inch). [facility] was called and cannot provide a larger, open heeled cast shoe. [facility] was contacted and can provide a cast shoe if needed. I spoke with Zachery Webber PT who will further assess provided cast shoe. Patient had no questions regarding use, adjustment, or maintenance of cast shoe. Patient signed [facility] [employer] Agreement sheet. Patient was left in care of BENY Mandujano with call light in place.

## 2019-01-01 NOTE — PROGRESS NOTES
Pt medicated with valium and oxy for pain with good results.  C/o of spasms to lt.leg.long leg cast on  Ambulated with therapy today in hallway.  Voiding in urinal

## 2019-01-01 NOTE — THERAPY
"Physical Therapy Treatment completed.   Bed Mobility:  Supine to Sit: Modified Independent  Transfers: Sit to Stand: Stand by Assist  Gait: Level Of Assist: Contact Guard Assist with Front-Wheel Walker   X 100 feet    Plan of Care: Will benefit from Physical Therapy 7 times per week  Discharge Recommendations: Equipment: Will Continue to Assess for Equipment Needs. Post-acute therapy Discharge to a transitional care facility for continued skilled therapy services.   Pt now has boot for cast and is walking better managed 100 feet today with SBA and crutches  See \"Rehab Therapy-Acute\" Patient Summary Report for complete documentation.       "

## 2019-01-01 NOTE — THERAPY
"Physical Therapy treatment completed.   Bed Mobility:  Supine to Sit: Modified Independent  Transfers: Sit to Stand: Contact Guard Assist  Gait: Level Of Assist: Contact Guard Assist with Crutches       Plan of Care: Will benefit from Physical Therapy 5-7 times per week  Discharge Recommendations: Equipment: Will Continue to Assess for Equipment Needs. Post-acute therapy Discharge to a transitional care facility for continued skilled therapy services.    Pt with long leg cast L LE limiting ability to ambulate. D/W pt and RN: PT recs for cast boot for L LE vs getting ok from MD to cut cast above ankle so pt can wear socks/ shoes which hopefully will help with stability when ambulating. RN put in a call to MD. [facility] for SNF.    See \"Rehab Therapy-Acute\" Patient Summary Report for complete documentation.     "

## 2019-01-02 LAB
GLUCOSE BLD-MCNC: 113 MG/DL (ref 65–99)
GLUCOSE BLD-MCNC: 124 MG/DL (ref 65–99)
GLUCOSE BLD-MCNC: 128 MG/DL (ref 65–99)
GLUCOSE BLD-MCNC: 182 MG/DL (ref 65–99)

## 2019-01-02 PROCEDURE — A9270 NON-COVERED ITEM OR SERVICE: HCPCS | Performed by: ORTHOPAEDIC SURGERY

## 2019-01-02 PROCEDURE — 700102 HCHG RX REV CODE 250 W/ 637 OVERRIDE(OP): Performed by: ORTHOPAEDIC SURGERY

## 2019-01-02 PROCEDURE — 700105 HCHG RX REV CODE 258: Performed by: ORTHOPAEDIC SURGERY

## 2019-01-02 PROCEDURE — 770001 HCHG ROOM/CARE - MED/SURG/GYN PRIV*

## 2019-01-02 PROCEDURE — 82962 GLUCOSE BLOOD TEST: CPT

## 2019-01-02 PROCEDURE — 97530 THERAPEUTIC ACTIVITIES: CPT

## 2019-01-02 PROCEDURE — 97116 GAIT TRAINING THERAPY: CPT

## 2019-01-02 PROCEDURE — 700111 HCHG RX REV CODE 636 W/ 250 OVERRIDE (IP): Performed by: ORTHOPAEDIC SURGERY

## 2019-01-02 RX ADMIN — INSULIN HUMAN 2 UNITS: 100 INJECTION, SOLUTION PARENTERAL at 18:00

## 2019-01-02 RX ADMIN — STANDARDIZED SENNA CONCENTRATE AND DOCUSATE SODIUM 1 TABLET: 8.6; 5 TABLET, FILM COATED ORAL at 20:42

## 2019-01-02 RX ADMIN — OXYCODONE HYDROCHLORIDE 10 MG: 10 TABLET ORAL at 15:16

## 2019-01-02 RX ADMIN — OXYCODONE HYDROCHLORIDE 10 MG: 10 TABLET ORAL at 05:46

## 2019-01-02 RX ADMIN — ASPIRIN 81 MG: 81 TABLET, COATED ORAL at 05:46

## 2019-01-02 RX ADMIN — SODIUM CHLORIDE 2 G: 9 INJECTION, SOLUTION INTRAVENOUS at 05:48

## 2019-01-02 RX ADMIN — SODIUM CHLORIDE 2 G: 9 INJECTION, SOLUTION INTRAVENOUS at 15:16

## 2019-01-02 RX ADMIN — SODIUM CHLORIDE 2 G: 9 INJECTION, SOLUTION INTRAVENOUS at 20:47

## 2019-01-02 RX ADMIN — OXYCODONE HYDROCHLORIDE 10 MG: 10 TABLET ORAL at 20:42

## 2019-01-02 RX ADMIN — ATORVASTATIN CALCIUM 40 MG: 40 TABLET, FILM COATED ORAL at 17:54

## 2019-01-02 RX ADMIN — ASPIRIN 81 MG: 81 TABLET, COATED ORAL at 15:16

## 2019-01-02 ASSESSMENT — PAIN SCALES - GENERAL
PAINLEVEL_OUTOF10: 5
PAINLEVEL_OUTOF10: 10
PAINLEVEL_OUTOF10: 0
PAINLEVEL_OUTOF10: 6
PAINLEVEL_OUTOF10: 6

## 2019-01-02 ASSESSMENT — GAIT ASSESSMENTS
DEVIATION: STEP TO
DISTANCE (FEET): 100
GAIT LEVEL OF ASSIST: STAND BY ASSIST
ASSISTIVE DEVICE: FRONT WHEEL WALKER

## 2019-01-02 NOTE — DISCHARGE PLANNING
Received Choice form at [phone]  Agency/Facility Name: [facility]  Referral sent per Choice form @ [phone]

## 2019-01-02 NOTE — THERAPY
"Physical Therapy treatment completed.   Bed Mobility:  Supine to Sit: Modified Independent  Transfers: Sit to Stand: Supervised  Gait: Level Of Assist: Stand by Assist with Front-Wheel Walker; CGA with crutches      Plan of Care: Will benefit from Physical Therapy 7 times per week  Discharge Recommendations: Equipment: Front-Wheel Walker vs crutches. Post-acute therapy Discharge to a transitional care facility for continued skilled therapy services.    Pts gait steadier with improved ability to WB L LE with FWW. With crutches pt tends to put limited weight and essentially swing through step. Pt agreeable to use FWW.      See \"Rehab Therapy-Acute\" Patient Summary Report for complete documentation.     "

## 2019-01-02 NOTE — DISCHARGE PLANNING
Agency/Facility Name: [facility]  Spoke To: Correspondence  Outcome: Patient declined due to no medicaid beds at this time.

## 2019-01-02 NOTE — DISCHARGE PLANNING
Agency/Facility Name: [facility]  Spoke To: Correspondence  Outcome: Patient declined due to no payer source.

## 2019-01-02 NOTE — CARE PLAN
Problem: Safety  Goal: Will remain free from injury  Outcome: PROGRESSING AS EXPECTED  Pt injury free at present  Call light in reach.hourly rounding    Problem: Mobility  Goal: Risk for activity intolerance will decrease  Outcome: PROGRESSING AS EXPECTED  Long leg cast on with walking shoe.  PT involved

## 2019-01-02 NOTE — PROGRESS NOTES
Pt c/o bottom lt side tooth hurting,tooth is loose.  anbesol gotten from the pharmacy.has helped tooth pain.

## 2019-01-02 NOTE — PROGRESS NOTES
" Subjective:      No events.  Cultures remain NGTD.    Objective:  Blood pressure 117/80, pulse (!) 102, temperature 36.7 °C (98.1 °F), temperature source Oral, resp. rate 18, height 1.829 m (6' 0.01\"), weight 82.6 kg (182 lb 1.6 oz), SpO2 95 %.                Intake/Output Summary (Last 24 hours) at 01/02/19 0612  Last data filed at 01/02/19 0500   Gross per 24 hour   Intake             1520 ml   Output              900 ml   Net              620 ml       Comfortable, no distress  Neurologically and vascularly intact with palpable pedal pulses bilaterally.  Cast in place    Assessment:    Doing well s/p reimplantation total knee arthroplasty.  Long leg cast for flexion contracture.    Plan:      Diet as tolerated  Mobilize today - WBAT  OT/PT  DVT ppx - ASA BID + Sequential Compression Devices  Dispo planning.  SNF placement pending.  Follow-up with Dr. Boston' office approximately 2 weeks post-op.    "

## 2019-01-02 NOTE — PROGRESS NOTES
Report received from Nazia SWAN. Pt pleasant, cooperative. Amenable to abx dose later tonight. Pt aware of importance of abx and proper blood glucose control for wound healing. CMS intact to L limb. Pt reports mild discomfort to heel. Denies need for pain intervention. Refusing SCDs. Pt aware to call before ambulating. Denies needs at this time. Will continue to monitor.

## 2019-01-03 LAB
GLUCOSE BLD-MCNC: 103 MG/DL (ref 65–99)
GLUCOSE BLD-MCNC: 124 MG/DL (ref 65–99)
GLUCOSE BLD-MCNC: 142 MG/DL (ref 65–99)
GLUCOSE BLD-MCNC: 243 MG/DL (ref 65–99)

## 2019-01-03 PROCEDURE — 700111 HCHG RX REV CODE 636 W/ 250 OVERRIDE (IP): Performed by: ORTHOPAEDIC SURGERY

## 2019-01-03 PROCEDURE — 770001 HCHG ROOM/CARE - MED/SURG/GYN PRIV*

## 2019-01-03 PROCEDURE — A9270 NON-COVERED ITEM OR SERVICE: HCPCS | Performed by: ORTHOPAEDIC SURGERY

## 2019-01-03 PROCEDURE — 700105 HCHG RX REV CODE 258: Performed by: ORTHOPAEDIC SURGERY

## 2019-01-03 PROCEDURE — 700112 HCHG RX REV CODE 229: Performed by: ORTHOPAEDIC SURGERY

## 2019-01-03 PROCEDURE — 700102 HCHG RX REV CODE 250 W/ 637 OVERRIDE(OP): Performed by: ORTHOPAEDIC SURGERY

## 2019-01-03 PROCEDURE — 82962 GLUCOSE BLOOD TEST: CPT

## 2019-01-03 RX ADMIN — DIAZEPAM 5 MG: 5 TABLET ORAL at 15:17

## 2019-01-03 RX ADMIN — DIAZEPAM 5 MG: 5 TABLET ORAL at 23:50

## 2019-01-03 RX ADMIN — ASPIRIN 81 MG: 81 TABLET, COATED ORAL at 02:02

## 2019-01-03 RX ADMIN — DIAZEPAM 5 MG: 5 TABLET ORAL at 07:34

## 2019-01-03 RX ADMIN — OXYCODONE HYDROCHLORIDE 10 MG: 10 TABLET ORAL at 22:29

## 2019-01-03 RX ADMIN — DOCUSATE SODIUM 100 MG: 100 CAPSULE, LIQUID FILLED ORAL at 17:23

## 2019-01-03 RX ADMIN — SODIUM CHLORIDE 2 G: 9 INJECTION, SOLUTION INTRAVENOUS at 14:42

## 2019-01-03 RX ADMIN — OXYCODONE HYDROCHLORIDE 10 MG: 10 TABLET ORAL at 18:59

## 2019-01-03 RX ADMIN — INSULIN HUMAN 3 UNITS: 100 INJECTION, SOLUTION PARENTERAL at 11:23

## 2019-01-03 RX ADMIN — OXYCODONE HYDROCHLORIDE 10 MG: 10 TABLET ORAL at 02:02

## 2019-01-03 RX ADMIN — SODIUM CHLORIDE 2 G: 9 INJECTION, SOLUTION INTRAVENOUS at 06:31

## 2019-01-03 RX ADMIN — BENZOCAINE: 100 GEL TOPICAL at 15:18

## 2019-01-03 RX ADMIN — ATORVASTATIN CALCIUM 40 MG: 40 TABLET, FILM COATED ORAL at 17:23

## 2019-01-03 RX ADMIN — OXYCODONE HYDROCHLORIDE 10 MG: 10 TABLET ORAL at 06:30

## 2019-01-03 RX ADMIN — DOCUSATE SODIUM 100 MG: 100 CAPSULE, LIQUID FILLED ORAL at 06:30

## 2019-01-03 RX ADMIN — ASPIRIN 81 MG: 81 TABLET, COATED ORAL at 17:23

## 2019-01-03 ASSESSMENT — PAIN SCALES - GENERAL
PAINLEVEL_OUTOF10: 4
PAINLEVEL_OUTOF10: 6
PAINLEVEL_OUTOF10: 5
PAINLEVEL_OUTOF10: 7
PAINLEVEL_OUTOF10: 6
PAINLEVEL_OUTOF10: 6
PAINLEVEL_OUTOF10: 7
PAINLEVEL_OUTOF10: 4
PAINLEVEL_OUTOF10: 0
PAINLEVEL_OUTOF10: 9
PAINLEVEL_OUTOF10: 5

## 2019-01-03 NOTE — PROGRESS NOTES
Pt is AAO x4.  Pt reports a 7/10 low back pain level.  Medicated per MAR.  VS WNL.  L leg cast in place, CDI.  R PIV patent, saline locked.   POC discussed.  All needs met at this time.  Bed in low position.  Call light within reach.  Rounding in place.

## 2019-01-03 NOTE — PROGRESS NOTES
On reassessment of pt, pt found standing at sink ironing a pair of pants. He had removed one of the shelves from the cabinetry to iron on top of. Pt denying any pain or needs at this time.

## 2019-01-03 NOTE — CARE PLAN
Problem: Safety  Goal: Will remain free from injury  Outcome: PROGRESSING AS EXPECTED  Pt does not call for assistance. Frequently up wandering within room. Has been using crutches still despite advice from PT to use FWW. Room free of clutter, call light and personal belongings within reach, bed in low locked position, treaded slipper socks in place.      Problem: Pain Management  Goal: Pain level will decrease to patient's comfort goal  Outcome: PROGRESSING AS EXPECTED  Pt states his heel and calf pain is improved, primarily complains of pain in the knee now. Pt medicated with oxycodone for pain with improvement in comfort. Extra pillows in place for support/positioning.

## 2019-01-03 NOTE — CARE PLAN
Problem: Knowledge Deficit  Goal: Knowledge of the prescribed therapeutic regimen will improve  POC discussed. Pt understands we are still working on SNF. All questions and concerns addressed.     Problem: Pain Management  Goal: Pain level will decrease to patient's comfort goal  Will manage pain with PRN pain meds.

## 2019-01-03 NOTE — PROGRESS NOTES
Report received at change of shift, care of pt assumed. Pt ambulating around room with crutches, steady gait. Pt reports his pain level is improved. Updated on plan of care. Pt denies any needs at this time.

## 2019-01-03 NOTE — PROGRESS NOTES
" Subjective:      No events.    Objective:  Blood pressure 138/75, pulse 89, temperature 37 °C (98.6 °F), temperature source Oral, resp. rate 18, height 1.829 m (6' 0.01\"), weight 82.6 kg (182 lb 1.6 oz), SpO2 97 %.                Intake/Output Summary (Last 24 hours) at 01/03/19 0658  Last data filed at 01/03/19 0200   Gross per 24 hour   Intake              820 ml   Output                0 ml   Net              820 ml       Comfortable, no distress  Neurologically and vascularly intact with palpable pedal pulses bilaterally.  Cast in place      Assessment:    Doing well s/p reimplantation total knee arthroplasty.  LLC for flexion contracture    Plan:      Mobilize as tolerated  SNF if accepted  Continue cast for another 7-10 days  "

## 2019-01-04 LAB
GLUCOSE BLD-MCNC: 117 MG/DL (ref 65–99)
GLUCOSE BLD-MCNC: 123 MG/DL (ref 65–99)
GLUCOSE BLD-MCNC: 133 MG/DL (ref 65–99)
GLUCOSE BLD-MCNC: 134 MG/DL (ref 65–99)

## 2019-01-04 PROCEDURE — 700112 HCHG RX REV CODE 229: Performed by: ORTHOPAEDIC SURGERY

## 2019-01-04 PROCEDURE — A9270 NON-COVERED ITEM OR SERVICE: HCPCS | Performed by: ORTHOPAEDIC SURGERY

## 2019-01-04 PROCEDURE — 770001 HCHG ROOM/CARE - MED/SURG/GYN PRIV*

## 2019-01-04 PROCEDURE — 82962 GLUCOSE BLOOD TEST: CPT | Mod: 91

## 2019-01-04 PROCEDURE — 97116 GAIT TRAINING THERAPY: CPT

## 2019-01-04 PROCEDURE — 700102 HCHG RX REV CODE 250 W/ 637 OVERRIDE(OP): Performed by: ORTHOPAEDIC SURGERY

## 2019-01-04 RX ORDER — DOXYCYCLINE 100 MG/1
100 TABLET ORAL EVERY 12 HOURS
Status: DISCONTINUED | OUTPATIENT
Start: 2019-01-04 | End: 2019-01-16 | Stop reason: HOSPADM

## 2019-01-04 RX ADMIN — DOXYCYCLINE 100 MG: 100 TABLET, FILM COATED ORAL at 17:22

## 2019-01-04 RX ADMIN — DOCUSATE SODIUM 100 MG: 100 CAPSULE, LIQUID FILLED ORAL at 06:38

## 2019-01-04 RX ADMIN — STANDARDIZED SENNA CONCENTRATE AND DOCUSATE SODIUM 1 TABLET: 8.6; 5 TABLET, FILM COATED ORAL at 21:05

## 2019-01-04 RX ADMIN — DIAZEPAM 5 MG: 5 TABLET ORAL at 20:01

## 2019-01-04 RX ADMIN — OXYCODONE HYDROCHLORIDE 10 MG: 10 TABLET ORAL at 01:28

## 2019-01-04 RX ADMIN — OXYCODONE HYDROCHLORIDE 10 MG: 10 TABLET ORAL at 06:38

## 2019-01-04 RX ADMIN — ASPIRIN 81 MG: 81 TABLET, COATED ORAL at 17:22

## 2019-01-04 RX ADMIN — ATORVASTATIN CALCIUM 40 MG: 40 TABLET, FILM COATED ORAL at 17:22

## 2019-01-04 RX ADMIN — DIAZEPAM 5 MG: 5 TABLET ORAL at 11:04

## 2019-01-04 RX ADMIN — DOCUSATE SODIUM 100 MG: 100 CAPSULE, LIQUID FILLED ORAL at 17:22

## 2019-01-04 RX ADMIN — DOXYCYCLINE 100 MG: 100 TABLET, FILM COATED ORAL at 07:11

## 2019-01-04 RX ADMIN — ASPIRIN 81 MG: 81 TABLET, COATED ORAL at 06:38

## 2019-01-04 RX ADMIN — OXYCODONE HYDROCHLORIDE 10 MG: 10 TABLET ORAL at 17:26

## 2019-01-04 RX ADMIN — OXYCODONE HYDROCHLORIDE 10 MG: 10 TABLET ORAL at 21:05

## 2019-01-04 ASSESSMENT — PAIN SCALES - GENERAL
PAINLEVEL_OUTOF10: 4
PAINLEVEL_OUTOF10: 5
PAINLEVEL_OUTOF10: 6
PAINLEVEL_OUTOF10: 5
PAINLEVEL_OUTOF10: 4
PAINLEVEL_OUTOF10: 10
PAINLEVEL_OUTOF10: 8
PAINLEVEL_OUTOF10: 6
PAINLEVEL_OUTOF10: 4

## 2019-01-04 ASSESSMENT — GAIT ASSESSMENTS
ASSISTIVE DEVICE: FRONT WHEEL WALKER
GAIT LEVEL OF ASSIST: STAND BY ASSIST
DISTANCE (FEET): 200

## 2019-01-04 NOTE — CARE PLAN
Problem: Medication  Goal: Compliance with prescribed medication will improve  Outcome: PROGRESSING AS EXPECTED  Pt having increased back and leg pain overnight. Medicated with Oxycodone and Valium per MAR and assisted pt with repositioning.

## 2019-01-04 NOTE — PROGRESS NOTES
Pt is AAO x4.  Pt reports a 7/10 low back pain level.   Understands when next pain med is due.  Assisted in repositioning pt.   VS WNL.  L leg cast in place, CDI.  R PIV patent, saline locked.   POC discussed.  All needs met at this time.  Bed in low position.  Call light within reach.  Rounding in place

## 2019-01-04 NOTE — PROGRESS NOTES
"Total Joint Replacement Program Rounding     Inpatient bedside rounding completed to address quality of care provided by [facility] and overall patient experience at [facility].    Patient Satisfaction addressed. Patient/family updated on POC during hospital stay.  Thorough safety education completed including use of call light prior to all mobility throughout the entirety of the hospital stay.     No further questions/concerns at this time. Please see \"MyRounding\" for further details of rounding discussion. RN updated.         "

## 2019-01-04 NOTE — PROGRESS NOTES
" Subjective:      No events.  Uncomfortable, but tolerating the cast and ambulating in the castillo with assistance.    Objective:    Blood pressure 137/70, pulse 88, temperature 36.3 °C (97.4 °F), temperature source Oral, resp. rate 18, height 1.829 m (6' 0.01\"), weight 82.6 kg (182 lb 1.6 oz), SpO2 100 %.                Intake/Output Summary (Last 24 hours) at 01/04/19 0626  Last data filed at 01/04/19 0100   Gross per 24 hour   Intake             2003 ml   Output              950 ml   Net             1053 ml       Comfortable, no distress  Neurologically and vascularly intact with palpable pedal pulses bilaterally.  Cast in place      Assessment:    Doing well s/p reimplantation total knee arthroplasty.  Cultures remain NGTD  Cast for knee flexion contracture    Plan:      Switch to oral abx  Activity as tolerated  SNF pending, otherwise leave cast in place for another week.  "

## 2019-01-04 NOTE — CARE PLAN
Problem: Mobility  Goal: Risk for activity intolerance will decrease  Will ensure pt ambulates 50ftx3 today.     Problem: Psychosocial Needs:  Goal: Level of anxiety will decrease  Pt anxious regarding inability to get comfortable enough to sleep. Will assist pt in repositioning. Bedside encouragement and reasurrance given.

## 2019-01-04 NOTE — PROGRESS NOTES
Pt c/o lower back pain/spasms and pain to the L knee area. Pt assisted with repositioning with pillows and provided with heat packs. Updated on availability of next doses.

## 2019-01-05 LAB
GLUCOSE BLD-MCNC: 109 MG/DL (ref 65–99)
GLUCOSE BLD-MCNC: 99 MG/DL (ref 65–99)

## 2019-01-05 PROCEDURE — 770001 HCHG ROOM/CARE - MED/SURG/GYN PRIV*

## 2019-01-05 PROCEDURE — A9270 NON-COVERED ITEM OR SERVICE: HCPCS | Performed by: ORTHOPAEDIC SURGERY

## 2019-01-05 PROCEDURE — 700102 HCHG RX REV CODE 250 W/ 637 OVERRIDE(OP): Performed by: ORTHOPAEDIC SURGERY

## 2019-01-05 PROCEDURE — 82962 GLUCOSE BLOOD TEST: CPT | Mod: 91

## 2019-01-05 PROCEDURE — 700112 HCHG RX REV CODE 229: Performed by: ORTHOPAEDIC SURGERY

## 2019-01-05 RX ADMIN — OXYCODONE HYDROCHLORIDE 10 MG: 10 TABLET ORAL at 06:47

## 2019-01-05 RX ADMIN — DIAZEPAM 5 MG: 5 TABLET ORAL at 20:52

## 2019-01-05 RX ADMIN — STANDARDIZED SENNA CONCENTRATE AND DOCUSATE SODIUM 1 TABLET: 8.6; 5 TABLET, FILM COATED ORAL at 20:52

## 2019-01-05 RX ADMIN — DIAZEPAM 5 MG: 5 TABLET ORAL at 12:52

## 2019-01-05 RX ADMIN — OXYCODONE HYDROCHLORIDE 10 MG: 10 TABLET ORAL at 11:59

## 2019-01-05 RX ADMIN — DOXYCYCLINE 100 MG: 100 TABLET, FILM COATED ORAL at 16:03

## 2019-01-05 RX ADMIN — ASPIRIN 81 MG: 81 TABLET, COATED ORAL at 06:48

## 2019-01-05 RX ADMIN — OXYCODONE HYDROCHLORIDE 10 MG: 10 TABLET ORAL at 16:03

## 2019-01-05 RX ADMIN — ASPIRIN 81 MG: 81 TABLET, COATED ORAL at 16:03

## 2019-01-05 RX ADMIN — OXYCODONE HYDROCHLORIDE 10 MG: 10 TABLET ORAL at 23:52

## 2019-01-05 RX ADMIN — DOCUSATE SODIUM 100 MG: 100 CAPSULE, LIQUID FILLED ORAL at 16:03

## 2019-01-05 RX ADMIN — ATORVASTATIN CALCIUM 40 MG: 40 TABLET, FILM COATED ORAL at 16:03

## 2019-01-05 RX ADMIN — DOXYCYCLINE 100 MG: 100 TABLET, FILM COATED ORAL at 06:48

## 2019-01-05 RX ADMIN — DOCUSATE SODIUM 100 MG: 100 CAPSULE, LIQUID FILLED ORAL at 06:48

## 2019-01-05 ASSESSMENT — PAIN SCALES - GENERAL
PAINLEVEL_OUTOF10: 6
PAINLEVEL_OUTOF10: 3
PAINLEVEL_OUTOF10: 5

## 2019-01-05 ASSESSMENT — PATIENT HEALTH QUESTIONNAIRE - PHQ9
1. LITTLE INTEREST OR PLEASURE IN DOING THINGS: NOT AT ALL
2. FEELING DOWN, DEPRESSED, IRRITABLE, OR HOPELESS: NOT AT ALL
SUM OF ALL RESPONSES TO PHQ9 QUESTIONS 1 AND 2: 0

## 2019-01-05 NOTE — THERAPY
"Physical Therapy Treatment completed.   Bed Mobility:  Supine to Sit: Modified Independent  Transfers: Sit to Stand: Supervised  Gait: Level Of Assist: Stand by Assist with Front-Wheel Walker    X 200 ft   Plan of Care: Will benefit from Physical Therapy 5 times per week  Discharge Recommendations: Equipment: Front-Wheel Walker. Post-acute therapy Discharge to a transitional care facility for continued skilled therapy services.     See \"Rehab Therapy-Acute\" Patient Summary Report for complete documentation.     Pt stated the spasms are in his left upper thigh.  Pt did not rate the pain, but reported significant pain at rest and with gait.  Pt's shoe donned on right to decrease LLD resulting from cast with walking shoe.  Pt educated in proper gait pattern and demonstrated improved swing through on right.  Pt ambulates slowing and stated it is hard to advance the left LE with the heavy cast.  Pt rested 11 times in the 200 ft.  Pt adjusted height of walker to promote more erect gait.  Pt.  is making gains.  Gait and safety improved with use of walker vs crutches.  Swing through pattern improved this date.  Pt would benefit from cont skilled PT in acute care setting to cont to improve gait pattern and tolerance for activity.   "

## 2019-01-05 NOTE — PROGRESS NOTES
Report received from NOC RN, assumed care of pt at this time. POC and medications reviewed with pt. Pt verbalized understanding. Safety measures in place. Will continue to monitor.

## 2019-01-05 NOTE — PROGRESS NOTES
Asked pt if he wanted flu/pneumonia vaccine. Pt refused stating that MD told him to wait until after his follow up appointment.

## 2019-01-05 NOTE — PROGRESS NOTES
" Subjective:      No events.  Mobilizing.  No complaints about cast pain.    Objective:  Blood pressure 140/75, pulse 80, temperature 36.8 °C (98.2 °F), temperature source Oral, resp. rate 18, height 1.829 m (6' 0.01\"), weight 82.6 kg (182 lb 1.6 oz), SpO2 100 %.                Intake/Output Summary (Last 24 hours) at 01/05/19 0778  Last data filed at 01/05/19 0300   Gross per 24 hour   Intake              840 ml   Output             1850 ml   Net            -1010 ml       Comfortable, no distress  Neurologically and vascularly intact with palpable pedal pulses bilaterally.  Cast intact    Assessment:    Doing well s/p reimplantation total knee arthroplasty.  Long leg cast for flexion contracture    Plan:      Cont cast for another week  PO Doxycycline  Diet as tolerated  Mobilize today - WBAT  OT/PT  DVT ppx - ASA BID + Sequential Compression Devices  "

## 2019-01-06 LAB
GLUCOSE BLD-MCNC: 109 MG/DL (ref 65–99)
GLUCOSE BLD-MCNC: 215 MG/DL (ref 65–99)

## 2019-01-06 PROCEDURE — A9270 NON-COVERED ITEM OR SERVICE: HCPCS | Performed by: ORTHOPAEDIC SURGERY

## 2019-01-06 PROCEDURE — 700112 HCHG RX REV CODE 229: Performed by: ORTHOPAEDIC SURGERY

## 2019-01-06 PROCEDURE — 82962 GLUCOSE BLOOD TEST: CPT

## 2019-01-06 PROCEDURE — 770001 HCHG ROOM/CARE - MED/SURG/GYN PRIV*

## 2019-01-06 PROCEDURE — 700102 HCHG RX REV CODE 250 W/ 637 OVERRIDE(OP): Performed by: ORTHOPAEDIC SURGERY

## 2019-01-06 RX ADMIN — OXYCODONE HYDROCHLORIDE 10 MG: 10 TABLET ORAL at 22:07

## 2019-01-06 RX ADMIN — DIAZEPAM 5 MG: 5 TABLET ORAL at 17:02

## 2019-01-06 RX ADMIN — DIAZEPAM 5 MG: 5 TABLET ORAL at 08:16

## 2019-01-06 RX ADMIN — ATORVASTATIN CALCIUM 40 MG: 40 TABLET, FILM COATED ORAL at 17:02

## 2019-01-06 RX ADMIN — OXYCODONE HYDROCHLORIDE 10 MG: 10 TABLET ORAL at 15:22

## 2019-01-06 RX ADMIN — DOCUSATE SODIUM 100 MG: 100 CAPSULE, LIQUID FILLED ORAL at 17:02

## 2019-01-06 RX ADMIN — ASPIRIN 81 MG: 81 TABLET, COATED ORAL at 17:02

## 2019-01-06 RX ADMIN — OXYCODONE HYDROCHLORIDE 10 MG: 10 TABLET ORAL at 02:50

## 2019-01-06 RX ADMIN — INSULIN HUMAN 3 UNITS: 100 INJECTION, SOLUTION PARENTERAL at 17:03

## 2019-01-06 RX ADMIN — STANDARDIZED SENNA CONCENTRATE AND DOCUSATE SODIUM 1 TABLET: 8.6; 5 TABLET, FILM COATED ORAL at 22:07

## 2019-01-06 RX ADMIN — DOXYCYCLINE 100 MG: 100 TABLET, FILM COATED ORAL at 17:02

## 2019-01-06 ASSESSMENT — PAIN SCALES - GENERAL
PAINLEVEL_OUTOF10: 8
PAINLEVEL_OUTOF10: 9
PAINLEVEL_OUTOF10: 9
PAINLEVEL_OUTOF10: 6
PAINLEVEL_OUTOF10: 5
PAINLEVEL_OUTOF10: 6
PAINLEVEL_OUTOF10: 8

## 2019-01-06 NOTE — PROGRESS NOTES
Received report from day shift nurse. Assumed pt care.   Pt is A&Ox4, pt standing up with crutches at bedside. Pt on r.a.. No signs of SOB/respiratory distress. Labs noted, VSS - except HR of 107.   Pt c/o muscle spasm to L leg; given prn Valium 5mg per MAR. Assessment completed; long cast to L leg in place; + tingling to L toes.   Fall precautions in place.   Call light and personal belongings within reach.   Continue to monitor

## 2019-01-06 NOTE — CARE PLAN
Problem: Venous Thromboembolism (VTW)/Deep Vein Thrombosis (DVT) Prevention:  Goal: Patient will participate in Venous Thrombosis (VTE)/Deep Vein Thrombosis (DVT)Prevention Measures  Outcome: PROGRESSING AS EXPECTED   01/05/19 0800 01/05/19 2100   OTHER   Risk Assessment Score 3 --    VTE RISK High --    Pharmacologic Prophylaxis Used --  (asa)       Problem: Pain Management  Goal: Pain level will decrease to patient's comfort goal  Outcome: PROGRESSING AS EXPECTED   12/30/18 0803 01/04/19 1614 01/05/19 2352   OTHER   Pain Scale 0 - 10  --  --  6   Non Verbal Scale  Sleeping;Unlabored Breathing --  --    Therapist Pain Assessment --  (during activity and at rest. Pt UA to rate pain.) --    Comfort Goal --  --  Comfort at Rest;Comfort with Movement;Sleep Comfortably   Given prn Oyxcodone 5mg and Valium 5mg for pain management and muscle spasm   Will continue to medicate around the clock to keep pain under control

## 2019-01-06 NOTE — PROGRESS NOTES
Pt is AAO x4.  Pt reports an 8/10 low back/L leg pain level.   Medicated per MAR.   VS WNL.  L leg cast in place, CDI.  No IV, MD aware.   POC discussed.  All needs met at this time.  Bed in low position.  Call light within reach.  Rounding in place

## 2019-01-06 NOTE — PROGRESS NOTES
" Subjective:      No events.  Slept well last night.    Objective:  Blood pressure 128/70, pulse 95, temperature 36.4 °C (97.5 °F), temperature source Axillary, resp. rate 20, height 1.829 m (6' 0.01\"), weight 82.6 kg (182 lb 1.6 oz), SpO2 99 %.                Intake/Output Summary (Last 24 hours) at 01/06/19 0529  Last data filed at 01/05/19 2005   Gross per 24 hour   Intake             1020 ml   Output              400 ml   Net              620 ml       Comfortable, no distress  Neurologically and vascularly intact with palpable pedal pulses bilaterally.  Cast C/D/I      Assessment:    Doing well s/p reimplantation total knee arthroplasty with LLC for flexion contracture    Plan:    Continue current cares  "

## 2019-01-06 NOTE — CARE PLAN
Problem: Safety  Goal: Will remain free from injury  Pt encouraged to use FWW when ambulating long distances as he is more steady. Pt is steady and upself while in room ambulating short distances. Will ensure pt is free of injury.     Problem: Pain Management  Goal: Pain level will decrease to patient's comfort goal  Will control spasms with valium and oxy.

## 2019-01-07 LAB
GLUCOSE BLD-MCNC: 100 MG/DL (ref 65–99)
GLUCOSE BLD-MCNC: 110 MG/DL (ref 65–99)
GLUCOSE BLD-MCNC: 193 MG/DL (ref 65–99)
GLUCOSE BLD-MCNC: 267 MG/DL (ref 65–99)

## 2019-01-07 PROCEDURE — 700112 HCHG RX REV CODE 229: Performed by: ORTHOPAEDIC SURGERY

## 2019-01-07 PROCEDURE — A9270 NON-COVERED ITEM OR SERVICE: HCPCS | Performed by: ORTHOPAEDIC SURGERY

## 2019-01-07 PROCEDURE — 770001 HCHG ROOM/CARE - MED/SURG/GYN PRIV*

## 2019-01-07 PROCEDURE — 700102 HCHG RX REV CODE 250 W/ 637 OVERRIDE(OP): Performed by: ORTHOPAEDIC SURGERY

## 2019-01-07 PROCEDURE — 82962 GLUCOSE BLOOD TEST: CPT | Mod: 91

## 2019-01-07 RX ORDER — NICOTINE 21 MG/24HR
21 PATCH, TRANSDERMAL 24 HOURS TRANSDERMAL
Status: DISCONTINUED | OUTPATIENT
Start: 2019-01-07 | End: 2019-01-16 | Stop reason: HOSPADM

## 2019-01-07 RX ORDER — NICOTINE 21 MG/24HR
1 PATCH, TRANSDERMAL 24 HOURS TRANSDERMAL
Status: DISCONTINUED | OUTPATIENT
Start: 2019-01-07 | End: 2019-01-07

## 2019-01-07 RX ADMIN — ASPIRIN 81 MG: 81 TABLET, COATED ORAL at 17:24

## 2019-01-07 RX ADMIN — OXYCODONE HYDROCHLORIDE 10 MG: 10 TABLET ORAL at 09:02

## 2019-01-07 RX ADMIN — DIAZEPAM 5 MG: 5 TABLET ORAL at 06:44

## 2019-01-07 RX ADMIN — INSULIN HUMAN 5 UNITS: 100 INJECTION, SOLUTION PARENTERAL at 21:45

## 2019-01-07 RX ADMIN — DOCUSATE SODIUM 100 MG: 100 CAPSULE, LIQUID FILLED ORAL at 17:24

## 2019-01-07 RX ADMIN — DIPHENHYDRAMINE HCL 25 MG: 25 TABLET ORAL at 14:53

## 2019-01-07 RX ADMIN — DOXYCYCLINE 100 MG: 100 TABLET, FILM COATED ORAL at 06:44

## 2019-01-07 RX ADMIN — NICOTINE 14 MG: 14 PATCH, EXTENDED RELEASE TRANSDERMAL at 06:44

## 2019-01-07 RX ADMIN — STANDARDIZED SENNA CONCENTRATE AND DOCUSATE SODIUM 1 TABLET: 8.6; 5 TABLET, FILM COATED ORAL at 21:41

## 2019-01-07 RX ADMIN — ASPIRIN 81 MG: 81 TABLET, COATED ORAL at 06:44

## 2019-01-07 RX ADMIN — DOCUSATE SODIUM 100 MG: 100 CAPSULE, LIQUID FILLED ORAL at 06:44

## 2019-01-07 RX ADMIN — INSULIN HUMAN 2 UNITS: 100 INJECTION, SOLUTION PARENTERAL at 10:49

## 2019-01-07 RX ADMIN — DOXYCYCLINE 100 MG: 100 TABLET, FILM COATED ORAL at 17:24

## 2019-01-07 RX ADMIN — OXYCODONE HYDROCHLORIDE 10 MG: 10 TABLET ORAL at 21:41

## 2019-01-07 RX ADMIN — OXYCODONE HYDROCHLORIDE 10 MG: 10 TABLET ORAL at 17:24

## 2019-01-07 RX ADMIN — ATORVASTATIN CALCIUM 40 MG: 40 TABLET, FILM COATED ORAL at 17:24

## 2019-01-07 RX ADMIN — DIAZEPAM 5 MG: 5 TABLET ORAL at 14:54

## 2019-01-07 ASSESSMENT — PAIN SCALES - GENERAL
PAINLEVEL_OUTOF10: 9
PAINLEVEL_OUTOF10: 0
PAINLEVEL_OUTOF10: 9
PAINLEVEL_OUTOF10: 8

## 2019-01-07 NOTE — PROGRESS NOTES
Received report from NOC RN; assumed pt care. Pt A&Ox4, sitting up in bed. Pt denies pain. Cast noted to LLE. Denies numbness or tingling. Pt moved from 219 to 221-1. Pt irritated. Pt notified to call for assistance.

## 2019-01-07 NOTE — PROGRESS NOTES
"Received report from day shift nurse. Assumed pt care.   Pt is A&Ox4.  Pt on r.a.    No signs of SOB/respiratory distress.   Pt c/o pain to L leg; given prn Oxycodone 10mg per MAR.  Assessment completed, Labs noted, VSS.  Long leg cast with boot to L hip in place CDI, +CMS, + pulse, able to wiggle toes.   Pt c/o discomfort from the cast and stated that \"I'm getting tired of standing up. This cast is tight. Look! My body is off balance.\" Boot removed per pt's request  Advised pt to take off his pants to release some tightness. Assisted and repositioned pt in the recliner.  Call light and personal belongings within reach.   Continue to monitor.   "

## 2019-01-07 NOTE — PROGRESS NOTES
"This RN rounded on pt and found out that pt smoking on bed next to the wall-oxygen. This RN re-confirmed with the pt that this is a non-smoking facility. Pt stated \" I know that you guys dont allow me smoking here.\"  Pt reported that he has x3 more cigarettes in the drawer. Pt's cigarettes and  2 lighters confiscated.     In addition, pt said that he also smokes with his friends/visitors in the room.     Charge RN being aware of the situation.     Per pt's request for nicotine patch, will ask Dr. Boston for nicotine patch when he rounds on pt  "

## 2019-01-07 NOTE — PROGRESS NOTES
" Subjective:      Smoking in room overnight.  Having some spasms.  Otherwise no events.    Objective:  Blood pressure 105/77, pulse (!) 108, temperature 36.2 °C (97.1 °F), temperature source Oral, resp. rate 18, height 1.829 m (6' 0.01\"), weight 82.6 kg (182 lb 1.6 oz), SpO2 96 %.                Intake/Output Summary (Last 24 hours) at 01/07/19 0538  Last data filed at 01/06/19 2032   Gross per 24 hour   Intake              540 ml   Output             1000 ml   Net             -460 ml       Comfortable, no distress  Neurologically and vascularly intact with palpable pedal pulses bilaterally.  Cast C/D/I      Assessment:    Doing well s/p revision total knee arthroplasty.    Plan:      Diet as tolerated  Mobilize today - WBAT  OT/PT  DVT ppx - ASA BID + Sequential Compression Devices    Follow-up with Dr. Boston' office approximately 2 weeks post-op.    "

## 2019-01-07 NOTE — RESPIRATORY CARE
COPD EDUCATION by COPD CLINICAL EDUCATOR  1/7/2019 at 8:25 AM by Shannon Zhou     Patient reviewed by COPD education team. Patient does not qualify for COPD program.

## 2019-01-08 LAB
GLUCOSE BLD-MCNC: 112 MG/DL (ref 65–99)
GLUCOSE BLD-MCNC: 129 MG/DL (ref 65–99)
GLUCOSE BLD-MCNC: 157 MG/DL (ref 65–99)
GLUCOSE BLD-MCNC: 175 MG/DL (ref 65–99)

## 2019-01-08 PROCEDURE — 700102 HCHG RX REV CODE 250 W/ 637 OVERRIDE(OP): Performed by: ORTHOPAEDIC SURGERY

## 2019-01-08 PROCEDURE — 82962 GLUCOSE BLOOD TEST: CPT | Mod: 91

## 2019-01-08 PROCEDURE — 770006 HCHG ROOM/CARE - MED/SURG/GYN SEMI*

## 2019-01-08 PROCEDURE — A9270 NON-COVERED ITEM OR SERVICE: HCPCS | Performed by: ORTHOPAEDIC SURGERY

## 2019-01-08 PROCEDURE — 700112 HCHG RX REV CODE 229: Performed by: ORTHOPAEDIC SURGERY

## 2019-01-08 RX ADMIN — DOCUSATE SODIUM 100 MG: 100 CAPSULE, LIQUID FILLED ORAL at 17:12

## 2019-01-08 RX ADMIN — DOCUSATE SODIUM 100 MG: 100 CAPSULE, LIQUID FILLED ORAL at 07:20

## 2019-01-08 RX ADMIN — INSULIN HUMAN 2 UNITS: 100 INJECTION, SOLUTION PARENTERAL at 17:09

## 2019-01-08 RX ADMIN — INSULIN HUMAN 2 UNITS: 100 INJECTION, SOLUTION PARENTERAL at 22:43

## 2019-01-08 RX ADMIN — OXYCODONE HYDROCHLORIDE 10 MG: 10 TABLET ORAL at 10:33

## 2019-01-08 RX ADMIN — DOXYCYCLINE 100 MG: 100 TABLET, FILM COATED ORAL at 17:12

## 2019-01-08 RX ADMIN — DIAZEPAM 5 MG: 5 TABLET ORAL at 07:20

## 2019-01-08 RX ADMIN — OXYCODONE HYDROCHLORIDE 10 MG: 10 TABLET ORAL at 19:02

## 2019-01-08 RX ADMIN — ASPIRIN 81 MG: 81 TABLET, COATED ORAL at 17:12

## 2019-01-08 RX ADMIN — DOXYCYCLINE 100 MG: 100 TABLET, FILM COATED ORAL at 07:20

## 2019-01-08 RX ADMIN — ATORVASTATIN CALCIUM 40 MG: 40 TABLET, FILM COATED ORAL at 17:11

## 2019-01-08 RX ADMIN — ASPIRIN 81 MG: 81 TABLET, COATED ORAL at 07:20

## 2019-01-08 RX ADMIN — NICOTINE 21 MG: 21 PATCH, EXTENDED RELEASE TRANSDERMAL at 07:20

## 2019-01-08 ASSESSMENT — PAIN SCALES - GENERAL
PAINLEVEL_OUTOF10: ASSUMED PAIN PRESENT
PAINLEVEL_OUTOF10: 9
PAINLEVEL_OUTOF10: 10

## 2019-01-08 NOTE — PROGRESS NOTES
"Received report from NOC RN; assumed pt care. Pt A&Ox4, sitting up in bed. States the cast is \"too tight.\" Pt irritable and uncomfortable. Updated MD, plan to remove cast early next week, pt aware. Pt notified to call for assistance.   "

## 2019-01-08 NOTE — PROGRESS NOTES
" Subjective:      Intermittent pain and spasms.  No complaints otherwise.    Objective:  Blood pressure 130/73, pulse (!) 104, temperature 36.6 °C (97.8 °F), temperature source Oral, resp. rate 19, height 1.829 m (6' 0.01\"), weight 82.6 kg (182 lb 1.6 oz), SpO2 98 %.                Intake/Output Summary (Last 24 hours) at 01/08/19 0629  Last data filed at 01/08/19 0500   Gross per 24 hour   Intake              500 ml   Output              450 ml   Net               50 ml       Comfortable, no distress  Cast in place      Assessment:    s/p total knee arthroplasty reimplantation  Long leg cast for flexion contracture    Plan:      Continue current cares  Will plan to remove cast early next week.  Will need to set up outpatient PT and place to stay at discharge  "

## 2019-01-08 NOTE — PROGRESS NOTES
Pt is AOx4, full leg cast in place to LLE, still complaints of muscle spasms, will medicate per MAR.   Pt denies any numbness/tingling sensation to LLE.  Fall precautions in place.  Pt uses his crutches to ambulate.  Bed locked and low, controls on call light button and personal belongings within reach.

## 2019-01-09 LAB
GLUCOSE BLD-MCNC: 100 MG/DL (ref 65–99)
GLUCOSE BLD-MCNC: 124 MG/DL (ref 65–99)
GLUCOSE BLD-MCNC: 95 MG/DL (ref 65–99)
GLUCOSE BLD-MCNC: 95 MG/DL (ref 65–99)

## 2019-01-09 PROCEDURE — A9270 NON-COVERED ITEM OR SERVICE: HCPCS | Performed by: ORTHOPAEDIC SURGERY

## 2019-01-09 PROCEDURE — 82962 GLUCOSE BLOOD TEST: CPT

## 2019-01-09 PROCEDURE — 770001 HCHG ROOM/CARE - MED/SURG/GYN PRIV*

## 2019-01-09 PROCEDURE — 700102 HCHG RX REV CODE 250 W/ 637 OVERRIDE(OP): Performed by: ORTHOPAEDIC SURGERY

## 2019-01-09 RX ADMIN — DOXYCYCLINE 100 MG: 100 TABLET, FILM COATED ORAL at 17:22

## 2019-01-09 RX ADMIN — BENZOCAINE: 100 GEL TOPICAL at 14:17

## 2019-01-09 RX ADMIN — OXYCODONE HYDROCHLORIDE 10 MG: 10 TABLET ORAL at 00:58

## 2019-01-09 RX ADMIN — NICOTINE 21 MG: 21 PATCH, EXTENDED RELEASE TRANSDERMAL at 06:36

## 2019-01-09 RX ADMIN — DIAZEPAM 5 MG: 5 TABLET ORAL at 20:41

## 2019-01-09 RX ADMIN — OXYCODONE HYDROCHLORIDE 10 MG: 10 TABLET ORAL at 04:34

## 2019-01-09 RX ADMIN — ASPIRIN 81 MG: 81 TABLET, COATED ORAL at 06:36

## 2019-01-09 RX ADMIN — OXYCODONE HYDROCHLORIDE 5 MG: 5 TABLET ORAL at 17:23

## 2019-01-09 RX ADMIN — DOXYCYCLINE 100 MG: 100 TABLET, FILM COATED ORAL at 06:36

## 2019-01-09 RX ADMIN — ASPIRIN 81 MG: 81 TABLET, COATED ORAL at 17:23

## 2019-01-09 RX ADMIN — ATORVASTATIN CALCIUM 40 MG: 40 TABLET, FILM COATED ORAL at 17:23

## 2019-01-09 ASSESSMENT — PAIN SCALES - GENERAL
PAINLEVEL_OUTOF10: 7
PAINLEVEL_OUTOF10: 6
PAINLEVEL_OUTOF10: 8
PAINLEVEL_OUTOF10: 2
PAINLEVEL_OUTOF10: 10
PAINLEVEL_OUTOF10: 8

## 2019-01-09 NOTE — CARE PLAN
"Problem: Venous Thromboembolism (VTW)/Deep Vein Thrombosis (DVT) Prevention:  Goal: Patient will participate in Venous Thrombosis (VTE)/Deep Vein Thrombosis (DVT)Prevention Measures  Outcome: PROGRESSING AS EXPECTED   01/08/19 2249   Mechanical/VTE Prophylaxis   Mechanical Prophylaxis  SCDs, Sequential Compression Device   SCDs, Sequential Compression Device Refused   OTHER   Risk Assessment Score 2   VTE RISK Moderate   Pharmacologic Prophylaxis Used (ASA)       Problem: Pain Management  Goal: Pain level will decrease to patient's comfort goal  Outcome: PROGRESSING AS EXPECTED   01/08/19 2249 01/08/19 2337   OTHER   Pain Scale 0 - 10  Assumed Pain Present  (\"better\") --    Non Verbal Scale  --  Calm;Sleeping;Unlabored Breathing   Comfort Goal Comfort at Rest;Comfort with Movement;Perform Activity;Sleep Comfortably --          "

## 2019-01-09 NOTE — CARE PLAN
Problem: Safety  Goal: Will remain free from injury    Intervention: Provide assistance with mobility  Bed in low position, traded socks on, call light within reach. Pt instructed to call nurse for any further needs.       Problem: Pain Management  Goal: Pain level will decrease to patient's comfort goal    Intervention: Follow pain managment plan developed in collaboration with patient and Interdisciplinary Team  Pain assessed throughout shift, medicated as needed per MD order, see MAR.

## 2019-01-09 NOTE — PROGRESS NOTES
" Subjective:      No events.  Pain controlled.  Mobilizing well.    Objective:    Blood pressure 152/68, pulse 89, temperature 36.7 °C (98 °F), temperature source Oral, resp. rate 17, height 1.829 m (6' 0.01\"), weight 82.6 kg (182 lb 1.6 oz), SpO2 97 %.                Intake/Output Summary (Last 24 hours) at 01/09/19 0709  Last data filed at 01/09/19 0647   Gross per 24 hour   Intake             1520 ml   Output              500 ml   Net             1020 ml       Comfortable, no distress  Neurologically and vascularly intact   Cast in place      Assessment:    s/p reimplantation total knee arthroplasty.    Plan:      Continue current cares  Denied by all SNFs so far  Will remove cast this weekend and then can DC next day  Instructed patient to reach out to friends in the area he has stayed with before  Please assist in helping to set up outpt PT prior to discharge  "

## 2019-01-09 NOTE — PROGRESS NOTES
Received report from Avelina SWAN. Assumed care. This pt is AOx4, reports pain, will medicate per MAR. Patient and RN discussed plan of care including pain management, waiting for place for patient to discharge home: questions answered. Chart reviewed. Call light in place, fall precautions in place, patient educated on importance of calling for assistance. No additional needs at this time.

## 2019-01-10 LAB — GLUCOSE BLD-MCNC: 174 MG/DL (ref 65–99)

## 2019-01-10 PROCEDURE — 82962 GLUCOSE BLOOD TEST: CPT

## 2019-01-10 PROCEDURE — 700102 HCHG RX REV CODE 250 W/ 637 OVERRIDE(OP): Performed by: ORTHOPAEDIC SURGERY

## 2019-01-10 PROCEDURE — A9270 NON-COVERED ITEM OR SERVICE: HCPCS | Performed by: ORTHOPAEDIC SURGERY

## 2019-01-10 PROCEDURE — 770001 HCHG ROOM/CARE - MED/SURG/GYN PRIV*

## 2019-01-10 RX ADMIN — OXYCODONE HYDROCHLORIDE 10 MG: 10 TABLET ORAL at 23:47

## 2019-01-10 RX ADMIN — ATORVASTATIN CALCIUM 40 MG: 40 TABLET, FILM COATED ORAL at 18:40

## 2019-01-10 RX ADMIN — ASPIRIN 81 MG: 81 TABLET, COATED ORAL at 06:35

## 2019-01-10 RX ADMIN — INSULIN HUMAN 2 UNITS: 100 INJECTION, SOLUTION PARENTERAL at 12:18

## 2019-01-10 RX ADMIN — ASPIRIN 81 MG: 81 TABLET, COATED ORAL at 18:40

## 2019-01-10 RX ADMIN — OXYCODONE HYDROCHLORIDE 10 MG: 10 TABLET ORAL at 08:29

## 2019-01-10 RX ADMIN — DIAZEPAM 5 MG: 5 TABLET ORAL at 06:35

## 2019-01-10 RX ADMIN — DOXYCYCLINE 100 MG: 100 TABLET, FILM COATED ORAL at 06:35

## 2019-01-10 RX ADMIN — NICOTINE 21 MG: 21 PATCH, EXTENDED RELEASE TRANSDERMAL at 06:35

## 2019-01-10 RX ADMIN — BENZOCAINE: 100 GEL TOPICAL at 18:42

## 2019-01-10 RX ADMIN — DOXYCYCLINE 100 MG: 100 TABLET, FILM COATED ORAL at 18:40

## 2019-01-10 RX ADMIN — OXYCODONE HYDROCHLORIDE 10 MG: 10 TABLET ORAL at 01:27

## 2019-01-10 RX ADMIN — DIAZEPAM 5 MG: 5 TABLET ORAL at 22:44

## 2019-01-10 ASSESSMENT — PAIN SCALES - GENERAL
PAINLEVEL_OUTOF10: 8
PAINLEVEL_OUTOF10: 5
PAINLEVEL_OUTOF10: 9
PAINLEVEL_OUTOF10: 8
PAINLEVEL_OUTOF10: 8
PAINLEVEL_OUTOF10: 4

## 2019-01-10 NOTE — DISCHARGE PLANNING
Anticipated Discharge Disposition: Shelter    Action: CRESENCIO spoke to BENY Orellana, pt will dc once cast is removed this weekend.      CRESENCIO will set pt up with transportation on the day of dc.     Barriers to Discharge:  None    Plan: As stated above.

## 2019-01-10 NOTE — PROGRESS NOTES
Pt reporting pain and 'tightness' to LLE, PRN medication given. Pt refusing ACHS blood glucose check and insulin at this time. Provided with pudding, crackers, fresh water. Denies further needs

## 2019-01-10 NOTE — PROGRESS NOTES
Received report from Idalia and Barbara RNs. Assumed care. This pt is AOx4, reports pain and tightness in leg, will medicate per MAR. Patient and RN discussed plan of care including pain management, waiting for cast to be removed, waiting on placement after cast removal: questions answered. Chart reviewed. Call light in place, fall precautions in place, patient educated on importance of calling for assistance. No additional needs at this time.

## 2019-01-10 NOTE — PROGRESS NOTES
" Subjective:      No events.  Having some friction on the posterior thigh from the cast.  Wound care added some padding and he's feeling better.  No pressure ulcer.    Objective:    Blood pressure 129/70, pulse (!) 108, temperature 36.2 °C (97.1 °F), temperature source Oral, resp. rate 20, height 1.829 m (6' 0.01\"), weight 82.6 kg (182 lb 1.6 oz), SpO2 97 %.                Intake/Output Summary (Last 24 hours) at 01/10/19 1442  Last data filed at 01/10/19 1300   Gross per 24 hour   Intake             1300 ml   Output              650 ml   Net              650 ml       Comfortable, no distress  Neurologically and vascularly intact   Cast in place      Assessment:    Doing well s/p reimplantation total knee arthroplasty.    Plan:      Continue current cares.  Will remove the cast this weekend.  "

## 2019-01-10 NOTE — CARE PLAN
Problem: Discharge Barriers/Planning  Goal: Patient's continuum of care needs will be met  Outcome: PROGRESSING SLOWER THAN EXPECTED  Pt continues to deny having a place to stay once discharged  Intervention: Assess potential discharge barriers on admission and throughout hospital stay  Pt homeless  Intervention: Collaborate with Transitional Care Team and Interdisciplinary Team to meet discharge needs  Collaborating with LSW      Problem: Pain Management  Goal: Pain level will decrease to patient's comfort goal  Outcome: PROGRESSING SLOWER THAN EXPECTED  Pt having increased pain this shift, reinforced need to utilize PRN medications as needed  Intervention: Educate and implement non-pharmacologic comfort measures. Examples: relaxation, distration, play therapy, activity therapy, massage, etc.  Pt watching TV

## 2019-01-10 NOTE — PROGRESS NOTES
Pt reporting pain to L posterior thigh at top of cast. Visualized new pressure injury at this time. Wound photo obtained and uploading into Epic, as seen below. Wound consult placed per protocol. Nonadhesive foam dressing applied to area.

## 2019-01-10 NOTE — WOUND TEAM
"[facility]  Inpatient Services  Initial Wound and Skin Care Evaluation    Admission Date: 12/27/2018     Consult Date: 1/10/2018 @ 1131   HPI, PMH, SH: Reviewed    Unit where seen by Wound Team: 2221/01     WOUND CONSULT RELATED TO:  Possible pressure injury to left posterior thigh     SUBJECTIVE:  \"The cast rubs on my leg\"      Self Report / Pain Level:  Denies pain       OBJECTIVE:  Cast in place from thigh to base of foot. Pt sitting on side of bed.     WOUND TYPE, LOCATION, CHARACTERISTICS (Pressure Injuries: location, stage, POA or date identified)         Wound 01/09/19 Partial Thickness Wound Leg Left Posterior Thigh  (Active)   Site Assessment Red    Mine-wound Assessment Intact    Margins Attached edges    Wound Length (cm) 1 cm    Wound Width (cm) 1 cm    Wound Depth (cm) 0.1 cm    Wound Surface Area (cm^2) 1 cm^2    Tunneling 0 cm    Undermining 0 cm    Closure None    Drainage Amount None    Non-staged Wound Description Partial thickness    Treatments Cleansed    Cleansing Normal Saline Irrigation    Periwound Protectant Not Applicable    Dressing Options Adhesive Foam    Dressing Cleansing/Solutions Not Applicable    Dressing Changed Changed    Dressing Status Clean;Dry;Intact    Dressing Change Frequency Every 72 hrs    NEXT Dressing Change  01/13/19    NEXT Weekly Photo (Inpatient Only) 01/16/19    WOUND NURSE ONLY - Odor None    WOUND NURSE ONLY - Pulses VIRGILIO - cast is covering left DP/and PT pulses     WOUND NURSE ONLY - Exposed Structures None    WOUND NURSE ONLY - Tissue Type and Percentage 100% red                      Lab Values:    WBC:       WBC   Date/Time Value Ref Range Status   12/18/2018 08:49 AM 8.6 4.8 - 10.8 K/uL Final     AIC:      Lab Results   Component Value Date/Time    HBA1C 6.2 (H) 09/04/2018 08:15 AM         Culture:   Deferred, no s/s of infection att his time    INTERVENTIONS BY WOUND TEAM:  Patient sitting on side of bed. Pt leaned over to right side. Dressing " carefully removed. Cleansed wound w/ NS and gauze. Wound does not appear to be pressure related. Appears more related to friction. Pt reports cast rubs on leg. Adhesive foam applied on left posterior leg. Per Chart review, cast is supposed to be removed this weekend. Unable to assess pulse, cast is covering left DP/PT pulses.     Dressing selection:  Adhesive foam         Interdisciplinary consultation: Patient, Bedside RN     EVALUATION: 57 y/o male admitted for revision of left TKA. Wound appears to be friction related not pressure related. Wound is not on a bony prominence. Pt is up ambulating. Adhesive foam dressing place to protect skin from cast rubbing. Pt is to have cast removed this weekend.    Factors affecting wound healing: Cast placement  Goals: Steady decrease in wound area and depth weekly.    NURSING PLAN OF CARE ORDERS (X):    Dressing changes: See Dressing Care orders: X  Skin care: See Skin Care orders:   Rectal tube care: See Rectal Tube Care orders:   Other orders:    RSKIN: CURRENT (X) ORDERED (O):   Q shift Cliff:  X  Q shift pressure point assessments:  X  Pressure redistribution mattress     X       HUGH          Bariatric HUGH         Bariatric foam           Heel float boots          Float Heels off Bed with Pillows               Barrier wipes         Barrier Cream         Barrier paste          Sacral silicone dressing         Silicone O2 tubing         Anchorfast         Cannula fixation Device (Tender )          Gray Foam Ear protectors           Trach with Optifoam split foam                 Waffle cushion      O  Waffle Overlay      O   Rectal tube or BMS         Antifungal tx      Interdry          Reposition q 2 hours        Up to chair        Ambulate      PT/OT      X  Dietician        Diabetes Education      PO   X  TF     TPN     NPO   # days   Other        WOUND TEAM PLAN OF CARE (X):   NPWT change 3 x week:        Dressing changes by wound team:       Follow up as needed:     X   Other (explain):     Anticipated discharge plans (X):   SNF:           Home Care:           Outpatient Wound Center:            Self Care:            Other:         TBD

## 2019-01-11 PROCEDURE — 770001 HCHG ROOM/CARE - MED/SURG/GYN PRIV*

## 2019-01-11 PROCEDURE — 700102 HCHG RX REV CODE 250 W/ 637 OVERRIDE(OP): Performed by: ORTHOPAEDIC SURGERY

## 2019-01-11 PROCEDURE — A9270 NON-COVERED ITEM OR SERVICE: HCPCS | Performed by: ORTHOPAEDIC SURGERY

## 2019-01-11 RX ADMIN — OXYCODONE HYDROCHLORIDE 10 MG: 10 TABLET ORAL at 02:52

## 2019-01-11 RX ADMIN — DIAZEPAM 5 MG: 5 TABLET ORAL at 23:56

## 2019-01-11 RX ADMIN — ASPIRIN 81 MG: 81 TABLET, COATED ORAL at 16:10

## 2019-01-11 RX ADMIN — ATORVASTATIN CALCIUM 40 MG: 40 TABLET, FILM COATED ORAL at 16:10

## 2019-01-11 RX ADMIN — OXYCODONE HYDROCHLORIDE 10 MG: 10 TABLET ORAL at 16:10

## 2019-01-11 RX ADMIN — DOXYCYCLINE 100 MG: 100 TABLET, FILM COATED ORAL at 16:10

## 2019-01-11 RX ADMIN — DIPHENHYDRAMINE HCL 25 MG: 25 TABLET ORAL at 20:43

## 2019-01-11 RX ADMIN — DIAZEPAM 5 MG: 5 TABLET ORAL at 14:15

## 2019-01-11 RX ADMIN — OXYCODONE HYDROCHLORIDE 10 MG: 10 TABLET ORAL at 12:15

## 2019-01-11 ASSESSMENT — PAIN SCALES - GENERAL
PAINLEVEL_OUTOF10: 4
PAINLEVEL_OUTOF10: 4
PAINLEVEL_OUTOF10: 7
PAINLEVEL_OUTOF10: 4
PAINLEVEL_OUTOF10: 7
PAINLEVEL_OUTOF10: 7

## 2019-01-11 ASSESSMENT — PATIENT HEALTH QUESTIONNAIRE - PHQ9
1. LITTLE INTEREST OR PLEASURE IN DOING THINGS: NOT AT ALL
SUM OF ALL RESPONSES TO PHQ9 QUESTIONS 1 AND 2: 0
1. LITTLE INTEREST OR PLEASURE IN DOING THINGS: NOT AT ALL
SUM OF ALL RESPONSES TO PHQ9 QUESTIONS 1 AND 2: 0
2. FEELING DOWN, DEPRESSED, IRRITABLE, OR HOPELESS: NOT AT ALL
2. FEELING DOWN, DEPRESSED, IRRITABLE, OR HOPELESS: NOT AT ALL

## 2019-01-11 NOTE — PROGRESS NOTES
Pt restless, mildly agitated throughout the night. Noted to be sleeping at this time. Remained awake prior as of rounding at 0530 this AM. Chest rise and fall even and unlabored currently. Will continue to monitor.

## 2019-01-11 NOTE — DISCHARGE PLANNING
Anticipated Discharge Disposition: Shelter with out pt PT    Action: Pt is scheduled with [facility] out pt PT on 1/16 @ 1:45pm. CRESENCIO provided the address and contact information for pt.      CRESENCIO also provided pt with MTM contact information for transportation.     Barriers to Discharge: None    Plan: DC once medically cleared.

## 2019-01-11 NOTE — CARE PLAN
Problem: Bowel/Gastric:  Goal: Normal bowel function is maintained or improved  Outcome: PROGRESSING SLOWER THAN EXPECTED  Refuses bowel meds.   Intervention: Educate patient and significant other/support system about diet, fluid intake, medications and activity to promote bowel function  Reminded to maintain oral hydration      Problem: Pain Management  Goal: Pain level will decrease to patient's comfort goal  Outcome: PROGRESSING SLOWER THAN EXPECTED  Pt continues to have severe pain, which is requiring increasing amounts of pain medication. Non-pharmacologic pain interventions ineffective. Frequently refuses pain meds despite significant pain until pain is severe, then struggles to get pain level back down. Educated on proper use of pain medication. No evidence of learning.

## 2019-01-11 NOTE — PROGRESS NOTES
" Subjective:      No new concerns this morning.  Up mobilizing currently.    Objective:  Blood pressure 125/80, pulse (!) 118, temperature 36.6 °C (97.8 °F), temperature source Oral, resp. rate 18, height 1.829 m (6' 0.01\"), weight 82.6 kg (182 lb 1.6 oz), SpO2 97 %.                Intake/Output Summary (Last 24 hours) at 01/11/19 0530  Last data filed at 01/10/19 1300   Gross per 24 hour   Intake              760 ml   Output                0 ml   Net              760 ml       Comfortable, no distress  Neurologically and vascularly intact with palpable pedal pulses bilaterally.  Cast intact      Assessment:    S/p replant TKA    Plan:      Will remove the cast this afternoon.  "

## 2019-01-11 NOTE — PROGRESS NOTES
Report received from Esteban SWAN. Pt awake, alert, appropriate. Sitting up talking to visitor. Pt denies needs at this time. Will continue to monitor.

## 2019-01-12 PROCEDURE — 770001 HCHG ROOM/CARE - MED/SURG/GYN PRIV*

## 2019-01-12 PROCEDURE — 700102 HCHG RX REV CODE 250 W/ 637 OVERRIDE(OP): Performed by: ORTHOPAEDIC SURGERY

## 2019-01-12 PROCEDURE — A9270 NON-COVERED ITEM OR SERVICE: HCPCS | Performed by: ORTHOPAEDIC SURGERY

## 2019-01-12 RX ORDER — DOXYCYCLINE 100 MG/1
100 TABLET ORAL 2 TIMES DAILY
Qty: 60 TAB | Refills: 5 | Status: SHIPPED | OUTPATIENT
Start: 2019-01-12 | End: 2019-07-05

## 2019-01-12 RX ORDER — TRAMADOL HYDROCHLORIDE 50 MG/1
50 TABLET ORAL EVERY 4 HOURS PRN
Qty: 80 TAB | Refills: 0 | Status: SHIPPED | OUTPATIENT
Start: 2019-01-12 | End: 2019-01-22

## 2019-01-12 RX ORDER — MELOXICAM 15 MG/1
15 TABLET ORAL DAILY
Qty: 30 TAB | Refills: 1 | Status: SHIPPED | OUTPATIENT
Start: 2019-01-12 | End: 2019-07-05

## 2019-01-12 RX ORDER — METHOCARBAMOL 750 MG/1
750 TABLET, FILM COATED ORAL EVERY 8 HOURS PRN
Qty: 30 TAB | Refills: 1 | Status: SHIPPED | OUTPATIENT
Start: 2019-01-12 | End: 2019-01-22

## 2019-01-12 RX ORDER — OXYCODONE HYDROCHLORIDE 5 MG/1
5-10 TABLET ORAL EVERY 4 HOURS PRN
Qty: 60 TAB | Refills: 0 | Status: SHIPPED | OUTPATIENT
Start: 2019-01-12 | End: 2019-01-19

## 2019-01-12 RX ADMIN — DIAZEPAM 5 MG: 5 TABLET ORAL at 10:30

## 2019-01-12 RX ADMIN — NICOTINE 21 MG: 21 PATCH, EXTENDED RELEASE TRANSDERMAL at 06:39

## 2019-01-12 RX ADMIN — DIPHENHYDRAMINE HCL 25 MG: 25 TABLET ORAL at 18:27

## 2019-01-12 RX ADMIN — ATORVASTATIN CALCIUM 40 MG: 40 TABLET, FILM COATED ORAL at 21:20

## 2019-01-12 RX ADMIN — ASPIRIN 81 MG: 81 TABLET, COATED ORAL at 06:38

## 2019-01-12 RX ADMIN — OXYCODONE HYDROCHLORIDE 10 MG: 10 TABLET ORAL at 06:44

## 2019-01-12 RX ADMIN — ASPIRIN 81 MG: 81 TABLET, COATED ORAL at 21:19

## 2019-01-12 RX ADMIN — OXYCODONE HYDROCHLORIDE 10 MG: 10 TABLET ORAL at 02:14

## 2019-01-12 RX ADMIN — OXYCODONE HYDROCHLORIDE 10 MG: 10 TABLET ORAL at 21:23

## 2019-01-12 RX ADMIN — OXYCODONE HYDROCHLORIDE 10 MG: 10 TABLET ORAL at 18:22

## 2019-01-12 RX ADMIN — DOXYCYCLINE 100 MG: 100 TABLET, FILM COATED ORAL at 06:38

## 2019-01-12 RX ADMIN — DOXYCYCLINE 100 MG: 100 TABLET, FILM COATED ORAL at 21:19

## 2019-01-12 ASSESSMENT — PAIN SCALES - GENERAL
PAINLEVEL_OUTOF10: 5
PAINLEVEL_OUTOF10: 4
PAINLEVEL_OUTOF10: 8
PAINLEVEL_OUTOF10: 6
PAINLEVEL_OUTOF10: 4
PAINLEVEL_OUTOF10: 8
PAINLEVEL_OUTOF10: 7

## 2019-01-12 ASSESSMENT — PATIENT HEALTH QUESTIONNAIRE - PHQ9
SUM OF ALL RESPONSES TO PHQ9 QUESTIONS 1 AND 2: 0
1. LITTLE INTEREST OR PLEASURE IN DOING THINGS: NOT AT ALL
2. FEELING DOWN, DEPRESSED, IRRITABLE, OR HOPELESS: NOT AT ALL

## 2019-01-12 NOTE — CARE PLAN
Problem: Medication  Goal: Compliance with prescribed medication will improve  Outcome: PROGRESSING SLOWER THAN EXPECTED  Pt refuses insulin and blood sugar checks intermittently. Otherwise, compliant.  Intervention: Assess and address patient's barriers to compliance with prescribed medication regimen  Homeless, poor access to medication      Problem: Mobility  Goal: Risk for activity intolerance will decrease  Outcome: PROGRESSING AS EXPECTED  Pt up frequently in room. Occasionally ambulates halls. Tolerates well.   Intervention: Provide rest periods  Pt provided with adequate rest

## 2019-01-12 NOTE — PROGRESS NOTES
" Subjective:      I removed his cast last night.  The foot had gotten wet, so he had some skin changes, but no wounds.  He is feeling ok this morning and able to actively straighten the knee.    Objective:  Blood pressure 122/66, pulse (!) 102, temperature 36.5 °C (97.7 °F), temperature source Oral, resp. rate 20, height 1.829 m (6' 0.01\"), weight 82.6 kg (182 lb 1.6 oz), SpO2 94 %.                Intake/Output Summary (Last 24 hours) at 01/12/19 0649  Last data filed at 01/11/19 2100   Gross per 24 hour   Intake              360 ml   Output              500 ml   Net             -140 ml       Comfortable, no distress  Neurologically and vascularly intact with palpable pedal pulses bilaterally.  Wound is c/d/i    Assessment:    Doing well s/p replant total knee arthroplasty.    Plan:      Mobilize today  Focus on knee extension and quad sets, straight leg raises  Outpt PT has been set up for 1/16  Will plan on DC on Monday  "

## 2019-01-12 NOTE — PROGRESS NOTES
Report received from Pat SWAN. Pt awake, alert, appropriate and pleasant. In bed at this time with L ankle supported by pillow to extend leg. Pt denies needs at this time. Will continue to monitor.

## 2019-01-12 NOTE — FACE TO FACE
Face to Face Note  -  Durable Medical Equipment    Pramod Boston M.D. - NPI: [id number]  I certify that this patient is under my care and that they had a durable medical equipment(DME)face to face encounter by myself that meets the physician DME face-to-face encounter requirements with this patient on:    Date of encounter:   Patient:                    MRN:                       YOB: 2019  Korey Davis  [id number]  1960     The encounter with the patient was in whole, or in part, for the following medical condition, which is the primary reason for durable medical equipment:  Total Joint Replacement    I certify that, based on my findings, the following durable medical equipment is medically necessary:  Crutches.    HOME O2 Saturation Measurements:(Values must be present for Home Oxygen orders)         ,     ,         My Clinical findings support the need for the above equipment due to:  Abnormal Gait    Supporting Symptoms: Limp, pain, weakness

## 2019-01-13 LAB
GLUCOSE BLD-MCNC: 114 MG/DL (ref 65–99)
GLUCOSE BLD-MCNC: 131 MG/DL (ref 65–99)
GLUCOSE BLD-MCNC: 136 MG/DL (ref 65–99)
GLUCOSE BLD-MCNC: 84 MG/DL (ref 65–99)

## 2019-01-13 PROCEDURE — 700102 HCHG RX REV CODE 250 W/ 637 OVERRIDE(OP): Performed by: ORTHOPAEDIC SURGERY

## 2019-01-13 PROCEDURE — 770006 HCHG ROOM/CARE - MED/SURG/GYN SEMI*

## 2019-01-13 PROCEDURE — A9270 NON-COVERED ITEM OR SERVICE: HCPCS | Performed by: ORTHOPAEDIC SURGERY

## 2019-01-13 PROCEDURE — 82962 GLUCOSE BLOOD TEST: CPT | Mod: 91

## 2019-01-13 RX ADMIN — OXYCODONE HYDROCHLORIDE 10 MG: 10 TABLET ORAL at 06:34

## 2019-01-13 RX ADMIN — NICOTINE 21 MG: 21 PATCH, EXTENDED RELEASE TRANSDERMAL at 06:35

## 2019-01-13 RX ADMIN — DIPHENHYDRAMINE HCL 25 MG: 25 TABLET ORAL at 19:26

## 2019-01-13 RX ADMIN — OXYCODONE HYDROCHLORIDE 10 MG: 10 TABLET ORAL at 20:33

## 2019-01-13 RX ADMIN — OXYCODONE HYDROCHLORIDE 10 MG: 10 TABLET ORAL at 14:59

## 2019-01-13 RX ADMIN — ATORVASTATIN CALCIUM 40 MG: 40 TABLET, FILM COATED ORAL at 17:00

## 2019-01-13 RX ADMIN — DOXYCYCLINE 100 MG: 100 TABLET, FILM COATED ORAL at 06:34

## 2019-01-13 RX ADMIN — DOXYCYCLINE 100 MG: 100 TABLET, FILM COATED ORAL at 17:00

## 2019-01-13 RX ADMIN — ASPIRIN 81 MG: 81 TABLET, COATED ORAL at 06:37

## 2019-01-13 RX ADMIN — ASPIRIN 81 MG: 81 TABLET, COATED ORAL at 17:00

## 2019-01-13 RX ADMIN — OXYCODONE HYDROCHLORIDE 10 MG: 10 TABLET ORAL at 09:38

## 2019-01-13 ASSESSMENT — PAIN SCALES - GENERAL
PAINLEVEL_OUTOF10: 5
PAINLEVEL_OUTOF10: 4
PAINLEVEL_OUTOF10: 5
PAINLEVEL_OUTOF10: 8
PAINLEVEL_OUTOF10: 8
PAINLEVEL_OUTOF10: 6
PAINLEVEL_OUTOF10: 9

## 2019-01-13 ASSESSMENT — PAIN SCALES - WONG BAKER: WONGBAKER_NUMERICALRESPONSE: HURTS A LITTLE MORE

## 2019-01-13 NOTE — PROGRESS NOTES
" Subjective:      Patient reports doing well. Patient denies chest pain, calf pain, shortness of breath.  Pain is currently under control. Patient is ambulating well with the use of an assistive device.    Objective:    Alert and oriented x 3  Afebrile  Blood pressure 132/78, pulse (!) 105, temperature 36.6 °C (97.9 °F), temperature source Oral, resp. rate 18, height 1.829 m (6' 0.01\"), weight 82.6 kg (182 lb 1.6 oz), SpO2 100 %.              No intake or output data in the 24 hours ending 01/13/19 0645    Comfortable, no distress  Neurologically and vascularly intact with palpable pedal pulses bilaterally.  Dressing C/D/I      Assessment:    Doing well s/p replant total knee arthroplasty.    Plan:      Mobilize as tolerated today  Continue to work on ROM  Plan on D/C tomorrow with outpt PT followup already scheduled.  "

## 2019-01-13 NOTE — CARE PLAN
Problem: Safety  Goal: Will remain free from injury  Outcome: PROGRESSING AS EXPECTED  Pt educated to call when in need. Call light and personal belongings w/n reach. Room free of clutters. Bed locked and in lowest position. Answer call light immediately. Hourly rounding.    Problem: Pain Management  Goal: Pain level will decrease to patient's comfort goal  Outcome: PROGRESSING AS EXPECTED  Patient medicated per MD orders. Encouraged the use of non-pharm measures of pain relief (i.e. Cold packs, distraction) and to inform RN if pain is greater than comfort level.

## 2019-01-13 NOTE — PROGRESS NOTES
Pt is AAO x4.  Pt reports an 8/10 low back/L leg pain level.   Medicated per MAR.   VS WNL.  L knee swollen with sx incision, ANTONIA.  No IV, MD aware.   POC discussed.  All needs met at this time.  Bed in low position.  Call light within reach.  Rounding in place

## 2019-01-13 NOTE — PROGRESS NOTES
Assessment completed. Pt AAOx4. C/o pain in the left leg 8/10. Due and PRN meds given. Left leg swelling non pitting. Incision clean, dry, intact, open to air. Denies any nausea, SOB, ang tingling. Reports numbness in left leg. Pt instructed to call when in need. Milk given as requested. No additional needs at this time.  Safety and comfort measures in place.

## 2019-01-13 NOTE — CARE PLAN
Problem: Pain Management  Goal: Pain level will decrease to patient's comfort goal  Will manage pain with PRN pain meds.     Problem: Psychosocial Needs:  Goal: Level of anxiety will decrease  Pt anxious regarding lack of sleep and ROM to L knee. Bedside encouragement and reassurance given.

## 2019-01-13 NOTE — PROGRESS NOTES
Report received from BENY Stewart. POC discussed. Pt sitting at the edge of the bed comfortably with no with signs of discomfort. Just had dinner and watching tv. Safety precautions in place.

## 2019-01-14 LAB
GLUCOSE BLD-MCNC: 109 MG/DL (ref 65–99)
GLUCOSE BLD-MCNC: 158 MG/DL (ref 65–99)
GLUCOSE BLD-MCNC: 245 MG/DL (ref 65–99)
GLUCOSE BLD-MCNC: 99 MG/DL (ref 65–99)

## 2019-01-14 PROCEDURE — 82962 GLUCOSE BLOOD TEST: CPT | Mod: 91

## 2019-01-14 PROCEDURE — 97535 SELF CARE MNGMENT TRAINING: CPT

## 2019-01-14 PROCEDURE — 97164 PT RE-EVAL EST PLAN CARE: CPT

## 2019-01-14 PROCEDURE — 97168 OT RE-EVAL EST PLAN CARE: CPT

## 2019-01-14 PROCEDURE — 770006 HCHG ROOM/CARE - MED/SURG/GYN SEMI*

## 2019-01-14 PROCEDURE — A9270 NON-COVERED ITEM OR SERVICE: HCPCS | Performed by: ORTHOPAEDIC SURGERY

## 2019-01-14 PROCEDURE — 700102 HCHG RX REV CODE 250 W/ 637 OVERRIDE(OP): Performed by: ORTHOPAEDIC SURGERY

## 2019-01-14 PROCEDURE — 97110 THERAPEUTIC EXERCISES: CPT

## 2019-01-14 RX ADMIN — NICOTINE 21 MG: 21 PATCH, EXTENDED RELEASE TRANSDERMAL at 07:34

## 2019-01-14 RX ADMIN — DOXYCYCLINE 100 MG: 100 TABLET, FILM COATED ORAL at 07:34

## 2019-01-14 RX ADMIN — DIAZEPAM 5 MG: 5 TABLET ORAL at 17:10

## 2019-01-14 RX ADMIN — ASPIRIN 81 MG: 81 TABLET, COATED ORAL at 07:34

## 2019-01-14 RX ADMIN — ATORVASTATIN CALCIUM 40 MG: 40 TABLET, FILM COATED ORAL at 17:10

## 2019-01-14 RX ADMIN — OXYCODONE HYDROCHLORIDE 10 MG: 10 TABLET ORAL at 20:41

## 2019-01-14 RX ADMIN — OXYCODONE HYDROCHLORIDE 10 MG: 10 TABLET ORAL at 11:02

## 2019-01-14 RX ADMIN — OXYCODONE HYDROCHLORIDE 10 MG: 10 TABLET ORAL at 15:17

## 2019-01-14 RX ADMIN — DOXYCYCLINE 100 MG: 100 TABLET, FILM COATED ORAL at 17:10

## 2019-01-14 RX ADMIN — DIPHENHYDRAMINE HCL 25 MG: 25 TABLET ORAL at 20:41

## 2019-01-14 RX ADMIN — INSULIN HUMAN 2 UNITS: 100 INJECTION, SOLUTION PARENTERAL at 20:46

## 2019-01-14 RX ADMIN — ASPIRIN 81 MG: 81 TABLET, COATED ORAL at 17:10

## 2019-01-14 RX ADMIN — OXYCODONE HYDROCHLORIDE 10 MG: 10 TABLET ORAL at 07:34

## 2019-01-14 RX ADMIN — OXYCODONE HYDROCHLORIDE 10 MG: 10 TABLET ORAL at 00:24

## 2019-01-14 RX ADMIN — INSULIN HUMAN 3 UNITS: 100 INJECTION, SOLUTION PARENTERAL at 17:12

## 2019-01-14 ASSESSMENT — PAIN SCALES - GENERAL
PAINLEVEL_OUTOF10: 10
PAINLEVEL_OUTOF10: 8
PAINLEVEL_OUTOF10: 5
PAINLEVEL_OUTOF10: 9
PAINLEVEL_OUTOF10: 6
PAINLEVEL_OUTOF10: 6
PAINLEVEL_OUTOF10: 9
PAINLEVEL_OUTOF10: 10

## 2019-01-14 ASSESSMENT — GAIT ASSESSMENTS
DISTANCE (FEET): 50
GAIT LEVEL OF ASSIST: CONTACT GUARD ASSIST
ASSISTIVE DEVICE: FRONT WHEEL WALKER

## 2019-01-14 NOTE — THERAPY
"Occupational Therapy Re-Evaluation completed.   Functional Status:  Pt previously evaluated post surgery, was doing well.  Has now gotten out of cast he's been in for a couple weeks on LLE, and has edema and limited knee ROM in LLE making LB dressing difficult.  OT asked to re-evaluate.  Pt encountered seated EOB.  L knee painful, swollen and with limited ROM.  With extra time and reacher he was able to don shorts with CGA.  He needed cues for technique with reacher and safe use of crutches while standing to hike pants.  Socks removed with setup using LH shoehorn, and he was able to don socks with setup using semi-rigid sockaide.  Pt provided reacher by this OT from special needs low income supply.  Recommended to pt to try [facility] for the remaining items needed for dressing and also a shower chair for safety with bathing.  Lady friend in room stated she could get a ride up to [facility] to obtain equipment for him.  Still unclear where pt is going to discharge to at this time.  If he goes to a traditional shelter, DME although helpful will most likely not be something he can have there d/t lack of safe storage space where it won't be stolen.  Will follow with pt tomorrow to ensure d/c plan and f/u on what LB dressing equipment he has been able to secure.    Plan of Care: Will benefit from Occupational Therapy 3 times per week  Discharge Recommendations:  Equipment: Front-Wheel Walker, Crutches, Shower Chair, Will Continue to Assess for Equipment Needs and sockaide,  shoe horn. Post-acute therapy Discharge to a transitional care facility for continued skilled therapy services   VS Discharge to home with outpatient or home health for additional skilled therapy services.    See \"Rehab Therapy-Acute\" Patient Summary Report for complete documentation.    "

## 2019-01-14 NOTE — DISCHARGE PLANNING
Anticipated Discharge Disposition: Shelter    Action: Received phone call from pt's mother expressing concerns regarding pt being discharged tomorrow. She states pt is reporting to her that he can't walk and she doesn't understand why pt is being discharged to the shelter if he's unable to walk. She states she called pt's insurance and verified they do cover SNF, however pt has been declined from all SNF facilities.   PT note on 1/8 indicated pt was ambulating independently and currently doesn't have any PT needs.     Barriers to Discharge: None    Plan: Discharge to shelter when cleared.

## 2019-01-14 NOTE — DISCHARGE SUMMARY
Patient was admitted for a replant total knee arthroplasty.  There were no complications during the surgery. On POD#2 he was placed into a long leg cast due to a recurrent flexion contracture.  That remained in place for nearly 2 weeks.  Upon removal, he was able to maintain full extension.  He worked with PT and mobilized safely.  Cultures remained no growth.  He was continued on PO antibiotics for suppression due to history of infected TKA.            There are no active hospital problems to display for this patient.      Uneventful hospital course.     Medication List      START taking these medications      Instructions   doxycycline monohydrate 100 MG tablet  Commonly known as:  ADOXA   Take 1 Tab by mouth 2 times a day.  Dose:  100 mg     meloxicam 15 MG tablet  Commonly known as:  MOBIC   Take 1 Tab by mouth every day.  Dose:  15 mg     methocarbamol 750 MG Tabs  Commonly known as:  ROBAXIN   Take 1 Tab by mouth every 8 hours as needed (spasms) for up to 10 days.  Dose:  750 mg     oxyCODONE immediate-release 5 MG Tabs  Commonly known as:  ROXICODONE   Take 1-2 Tabs by mouth every four hours as needed for Severe Pain for up to 7 days.  Dose:  5-10 mg     tramadol 50 MG Tabs  Commonly known as:  ULTRAM   Take 1 Tab by mouth every four hours as needed (Moderate Pain (NRS Pain Scale 4-6; CPOT Pain Scale 3-5) if opiates not ordered or tolerated) for up to 10 days.  Dose:  50 mg     Aspirin 81mg                                                                  Take 1 by mouth twice a day for                                                                                           4 weeks from the time of surgery      CONTINUE taking these medications      Instructions   albuterol 108 (90 Base) MCG/ACT Aers inhalation aerosol   Inhale 2 Puffs by mouth every 6 hours as needed for Shortness of Breath.  Dose:  2 Puff     metFORMIN  MG Tb24  Commonly known as:  GLUCOPHAGE XR   Take 1 Tab by mouth every day.  Dose:   500 mg              Patient will be discharged home and follow up with Dr. Boston in 2 weeks. [facility] office phone number is [phone].  He is scheduled with [facility] outpatient PT on 1/16 @ 1:45pm. SW provided the address and contact information for pt.      Encourage ROM of the knee, particularly full extension.     Instructions:  -Use ice and elevation frequently to help with pain and swelling control  -Put as much weight as comfortable on the operative leg.  Use a walker to assist with balance.  -Take your blood clot prevention medication for 4 weeks after surgery (aspirin 81mg twice a day).

## 2019-01-14 NOTE — PROGRESS NOTES
" Subjective:      No events.     Objective:  Blood pressure 132/59, pulse (!) 107, temperature 36.6 °C (97.9 °F), temperature source Oral, resp. rate 19, height 1.829 m (6' 0.01\"), weight 82.6 kg (182 lb 1.6 oz), SpO2 100 %.                Intake/Output Summary (Last 24 hours) at 01/14/19 0533  Last data filed at 01/13/19 1923   Gross per 24 hour   Intake              350 ml   Output              500 ml   Net             -150 ml       Comfortable, no distress  Neurologically and vascularly intact with palpable pedal pulses bilaterally.  Dressing C/D/I      Assessment:    Doing well s/p replant total knee arthroplasty.    Plan:      D/C today  Prescriptions written  PT scheduled.  "

## 2019-01-14 NOTE — PROGRESS NOTES
"Pt is able to ambulate inside room using crutches.  Pain medicated per MAR.  LLE remains w/ 1+ edema    Pt educated about elevating leg to help with swelling but is unable to tolerate position due to pain per pt.    Pt request for his back to be \"stretched\". This is done by pt ambulating to the nearest wall/ to his bathroom door and slowly straightening his back against the wall/door.  Per pt this helps \"relieve some tension.\"    Pt is reminded about knee precautions.    Will continue to monitor.  "

## 2019-01-14 NOTE — CARE PLAN
Problem: Discharge Barriers/Planning  Goal: Patient's continuum of care needs will be met  DC discussed with pt. Pt continues to feel unsafe DC'ing. Spoke to SW who will contact supervisor. Will collaborate with SW.     Problem: Pain Management  Goal: Pain level will decrease to patient's comfort goal  Will control pain with PRN pain meds and perform stretches to relieve back pain. Pt also encouraged to perform ROM and straighten/elevate L leg.

## 2019-01-14 NOTE — PROGRESS NOTES
"Pt is AOx4, seen standing up by bed using crutches. LLE is swollen 1+, incision site to left knee is open to air, dry and intact. No drainage noted.   Denies numbness/tingling sensation, pt only having pain to LLE.  Pt is requesting something for itching.  Non-skid socks in place.   Pt reminded about fall precautions, reports understanding and states that he wants to remain standing up.    Pt reports that he \"might go to a friends house for discharge. His name is Roderick.\"    Bed locked and low, controls on call light button and personal belongings within reach.  "

## 2019-01-15 LAB
GLUCOSE BLD-MCNC: 115 MG/DL (ref 65–99)
GLUCOSE BLD-MCNC: 124 MG/DL (ref 65–99)
GLUCOSE BLD-MCNC: 135 MG/DL (ref 65–99)
GLUCOSE BLD-MCNC: 99 MG/DL (ref 65–99)

## 2019-01-15 PROCEDURE — 82962 GLUCOSE BLOOD TEST: CPT

## 2019-01-15 PROCEDURE — 97116 GAIT TRAINING THERAPY: CPT

## 2019-01-15 PROCEDURE — 770006 HCHG ROOM/CARE - MED/SURG/GYN SEMI*

## 2019-01-15 PROCEDURE — A9270 NON-COVERED ITEM OR SERVICE: HCPCS | Performed by: ORTHOPAEDIC SURGERY

## 2019-01-15 PROCEDURE — 97530 THERAPEUTIC ACTIVITIES: CPT

## 2019-01-15 PROCEDURE — 700102 HCHG RX REV CODE 250 W/ 637 OVERRIDE(OP): Performed by: ORTHOPAEDIC SURGERY

## 2019-01-15 RX ADMIN — OXYCODONE HYDROCHLORIDE 10 MG: 10 TABLET ORAL at 17:38

## 2019-01-15 RX ADMIN — DOXYCYCLINE 100 MG: 100 TABLET, FILM COATED ORAL at 05:50

## 2019-01-15 RX ADMIN — ATORVASTATIN CALCIUM 40 MG: 40 TABLET, FILM COATED ORAL at 17:38

## 2019-01-15 RX ADMIN — OXYCODONE HYDROCHLORIDE 10 MG: 10 TABLET ORAL at 13:44

## 2019-01-15 RX ADMIN — ASPIRIN 81 MG: 81 TABLET, COATED ORAL at 17:38

## 2019-01-15 RX ADMIN — NICOTINE 21 MG: 21 PATCH, EXTENDED RELEASE TRANSDERMAL at 05:50

## 2019-01-15 RX ADMIN — DOXYCYCLINE 100 MG: 100 TABLET, FILM COATED ORAL at 17:38

## 2019-01-15 RX ADMIN — ASPIRIN 81 MG: 81 TABLET, COATED ORAL at 05:51

## 2019-01-15 RX ADMIN — OXYCODONE HYDROCHLORIDE 10 MG: 10 TABLET ORAL at 20:54

## 2019-01-15 RX ADMIN — OXYCODONE HYDROCHLORIDE 10 MG: 10 TABLET ORAL at 10:20

## 2019-01-15 RX ADMIN — OXYCODONE HYDROCHLORIDE 10 MG: 10 TABLET ORAL at 05:59

## 2019-01-15 ASSESSMENT — PAIN SCALES - GENERAL
PAINLEVEL_OUTOF10: 9

## 2019-01-15 ASSESSMENT — GAIT ASSESSMENTS
ASSISTIVE DEVICE: CRUTCHES
GAIT LEVEL OF ASSIST: STAND BY ASSIST
DISTANCE (FEET): 150

## 2019-01-15 NOTE — PROGRESS NOTES
A&oX4, R knee incision ANTONIA, no draiange, +CMS, edema to RLE, +pulse, -N/V, tolerating diet, voiding.  Updated on plan of care, awaiting [facility] assessment.

## 2019-01-15 NOTE — DISCHARGE PLANNING
Anticipated Discharge Disposition: [facility]    Action: LSW spoke w/ Codi at [facility] to confirm that pt's paperwork has been received. Codi confirmed that pt's paperwork has been received and will come to assess pt around 0830 on Wednesday the 16th of January. Codi from [facility] stated that after the assessment, Codi will update this LSW.     Barriers to Discharge: Placement    Plan: Pending acceptance to [facility].

## 2019-01-15 NOTE — CARE PLAN
Problem: Venous Thromboembolism (VTW)/Deep Vein Thrombosis (DVT) Prevention:  Goal: Patient will participate in Venous Thrombosis (VTE)/Deep Vein Thrombosis (DVT)Prevention Measures  Outcome: PROGRESSING AS EXPECTED   01/14/19 0738 01/14/19 2000   OTHER   Risk Assessment Score --  3   VTE RISK --  High   Pharmacologic Prophylaxis Used --  (ASA)   Mechanical/VTE Prophylaxis   Mechanical Prophylaxis  WILLY Hose (Graduated Compression Stockings) --    WILLY Hose (Graduated Compression Stockings) Refused --    SCDs, Sequential Compression Device Refused --        Problem: Pain Management  Goal: Pain level will decrease to patient's comfort goal  Outcome: PROGRESSING AS EXPECTED   01/15/19 0032   OTHER   Non Verbal Scale  Calm;Sleeping;Unlabored Breathing

## 2019-01-15 NOTE — DISCHARGE PLANNING
Anticipated Discharge Disposition: [facility]    Action: This LSW requested a [facility] referral be placed for this pt to supervisor. LSW supervisor faxed referral to [facility] requesting screen for eligibility of service.     Barriers to Discharge: Placement    Plan: Pending acceptance to [facility].    UPDATE: LSW called [facility] to request status update and [facility] reports that faxed referral has not been received due to [facility]'s fax machine glitching. LSW requested direct email to send referral. [facility] [place] provided LSW supervisor email provided by [facility]: [email or web address].

## 2019-01-15 NOTE — THERAPY
"Physical Therapy Re-Evaluation completed.   Bed Mobility:  Supine to Sit:  (NT- UP EOB pre and post)  Transfers: Sit to Stand: Contact Guard Assist  Gait: Level Of Assist: Contact Guard Assist with Front-Wheel Walker x 50 ft      Plan of Care: Will benefit from Physical Therapy 7 times per week  Discharge Recommendations: Equipment: Front-Wheel Walker. Post-acute therapy Discharge to a transitional care facility for continued skilled therapy services.    PT re-eval performed. Pts long leg cast was removed a few days ago and now pt presenting with increased mobility deficits as well as pain and weakness L LE, pt also reporting R knee is painful today. Recommend daily PT to improve strength and ROM L LE, at this time pt is not independent with mobility and therefore recommend SNF/rehab for further therapy prior to DC, unsafe to DC to shelter/ transitional living.     See \"Rehab Therapy-Acute\" Patient Summary Report for complete documentation.     "

## 2019-01-15 NOTE — THERAPY
"Physical Therapy Treatment completed.   Bed Mobility:  Supine to Sit: Modified Independent  Transfers: Sit to Stand: Stand by Assist  Gait: Level Of Assist: Stand by Assist with Crutches 2 x 150 feet     Plan of Care: Will benefit from Physical Therapy 7 times per week  Discharge Recommendations: Equipment: Will Continue to Assess for Equipment Needs. Post-acute therapy Discharge to a transitional care facility for continued skilled therapy services.   Much improved ambulation and standing posture today ROM 0 -95  See \"Rehab Therapy-Acute\" Patient Summary Report for complete documentation.       "

## 2019-01-15 NOTE — PROGRESS NOTES
" Subjective:      No events.  Feeling better this morning.  Working on dispo to [facility].    Objective:  Blood pressure 121/59, pulse 91, temperature 36.7 °C (98.1 °F), temperature source Oral, resp. rate 18, height 1.829 m (6' 0.01\"), weight 82.6 kg (182 lb 1.6 oz), SpO2 99 %.                Intake/Output Summary (Last 24 hours) at 01/15/19 0718  Last data filed at 01/14/19 2000   Gross per 24 hour   Intake              960 ml   Output                0 ml   Net              960 ml       Comfortable, no distress  Neurologically and vascularly intact with palpable pedal pulses bilaterally.  Incision C/D/I      Assessment:    s/p replant total knee arthroplasty.    Plan:      Continue current cares  Dispo planning to [facility]  PT/OT  Outpt PT established  "

## 2019-01-16 ENCOUNTER — APPOINTMENT (OUTPATIENT)
Dept: PHYSICAL THERAPY | Facility: REHABILITATION | Age: 59
End: 2019-01-16
Attending: ORTHOPAEDIC SURGERY
Payer: MEDICAID

## 2019-01-16 VITALS
RESPIRATION RATE: 18 BRPM | SYSTOLIC BLOOD PRESSURE: 125 MMHG | HEART RATE: 99 BPM | OXYGEN SATURATION: 92 % | WEIGHT: 182.1 LBS | BODY MASS INDEX: 24.66 KG/M2 | DIASTOLIC BLOOD PRESSURE: 58 MMHG | HEIGHT: 72 IN | TEMPERATURE: 98.2 F

## 2019-01-16 LAB — GLUCOSE BLD-MCNC: 162 MG/DL (ref 65–99)

## 2019-01-16 PROCEDURE — 82962 GLUCOSE BLOOD TEST: CPT

## 2019-01-16 PROCEDURE — A9270 NON-COVERED ITEM OR SERVICE: HCPCS | Performed by: ORTHOPAEDIC SURGERY

## 2019-01-16 PROCEDURE — 700102 HCHG RX REV CODE 250 W/ 637 OVERRIDE(OP): Performed by: ORTHOPAEDIC SURGERY

## 2019-01-16 PROCEDURE — 700112 HCHG RX REV CODE 229: Performed by: ORTHOPAEDIC SURGERY

## 2019-01-16 RX ADMIN — OXYCODONE HYDROCHLORIDE 10 MG: 10 TABLET ORAL at 00:07

## 2019-01-16 RX ADMIN — ASPIRIN 81 MG: 81 TABLET, COATED ORAL at 05:49

## 2019-01-16 RX ADMIN — DOCUSATE SODIUM 100 MG: 100 CAPSULE, LIQUID FILLED ORAL at 05:49

## 2019-01-16 RX ADMIN — OXYCODONE HYDROCHLORIDE 10 MG: 10 TABLET ORAL at 05:49

## 2019-01-16 RX ADMIN — NICOTINE 21 MG: 21 PATCH, EXTENDED RELEASE TRANSDERMAL at 05:48

## 2019-01-16 RX ADMIN — DOXYCYCLINE 100 MG: 100 TABLET, FILM COATED ORAL at 05:49

## 2019-01-16 RX ADMIN — INSULIN HUMAN 2 UNITS: 100 INJECTION, SOLUTION PARENTERAL at 06:43

## 2019-01-16 ASSESSMENT — PAIN SCALES - GENERAL: PAINLEVEL_OUTOF10: 9

## 2019-01-16 NOTE — PROGRESS NOTES
Went over all d/c instructions and meds itz pt.new crurches gotten for pt.  Escorted out via w/c with RN assist

## 2019-01-16 NOTE — PROGRESS NOTES
Pt sitting up in bed. A&Ox4. CMS intact on RLE. Pt c/o 9/10 pain. Medicated per MAR. R knee incision is open to air. Clean, dry and intact. No drainage. Fall precautions in place.

## 2019-01-16 NOTE — OP THERAPY EVALUATION
Outpatient Physical Therapy  INITIAL EVALUATION    [facility] [address]  [address]  [place] [address]  Phone:  [phone]  Fax:  [phone]    Date of Evaluation: 01/16/2019    Patient: Korey Davis  YOB: 1960  MRN: [id number]     Referring Provider: Pramod Boston M.D.  [address] [place] [address]   Referring Diagnosis Presence of left artificial knee joint [Z96.652]     Time Calculation                 Chief Complaint: No chief complaint on file.    Visit Diagnoses     ICD-10-CM   1. S/P revision of total knee, left Z96.652         Subjective    Past Medical History:   Diagnosis Date   • Arthritis     generalized, knees   • Asthma    • Back pain 08/17/2018    neck, knees, 8.5/10   • Bilateral knee pain 12/18/2018   • Breath shortness     12/18/18-Denies.   • COPD (chronic obstructive pulmonary disease) ([facility])     12/18/18-Pt denies.   • Dental disorder     upper partial, doesn't use   • Diabetes ([facility])     12/18/18-does not check glucose at home. Refuses to take meds.    • Pneumonia 06/2017   • Urinary bladder disorder     reports frequency   • Urinary incontinence      Past Surgical History:   Procedure Laterality Date   • CAST APPLICATION Left 12/30/2018    Procedure: CAST APPLICATION;  Surgeon: Pramod Boston M.D.;  Location: SURGERY [facility];  Service: Orthopedics   • KNEE REVISION TOTAL Left 12/27/2018    Procedure: KNEE REVISION TOTAL;  Surgeon: Pramod Boston M.D.;  Location: SURGERY [facility];  Service: Orthopedics   • KNEE REVISION TOTAL Left 8/21/2018    Procedure: KNEE REVISION TOTAL;  Surgeon: Pramod Boston M.D.;  Location: SURGERY [facility];  Service: Orthopedics   • COLONOSCOPY  07/2018   • HAND SURGERY Right 2016    unsuccessful tendon repair middle finger.   • KNEE REPLACEMENT, TOTAL  12/1/2011    [facility]   • KNEE ARTHROSCOPY Left [id number]    note 6 total left knee surgeries  in [place]     Social  History   Substance Use Topics   • Smoking status: Current Some Day Smoker     Packs/day: 0.75     Years: 35.00     Types: Cigarettes   • Smokeless tobacco: Never Used      Comment: and using e-cig   • Alcohol use No     Family and Occupational History     Social History   • Marital status: Single     Spouse name: N/A   • Number of children: N/A   • Years of education: N/A       Objective    Exercises/Treatment  Time-based treatments/modalities:          Assessment/Response/Plan    Functional Limitations and Severity Modifiers      Current:     Goal:       Referring provider co-signature:  I have reviewed this plan of care and my co-signature certifies the need for services.  Certification Dates:   From ***     To ***    Physician Signature: ________________________________ Date: ______________

## 2019-01-16 NOTE — PROGRESS NOTES
Total Joint Replacement Program Rounding     Rounding completed with RN and LSW regarding discharge planning.  Anticipate DC today pending transportation confirmation. Please see LSW notes for further details.

## 2019-01-16 NOTE — CARE PLAN
Problem: Safety  Goal: Will remain free from falls  Outcome: PROGRESSING AS EXPECTED      Problem: Pain Management  Goal: Pain level will decrease to patient's comfort goal  Outcome: PROGRESSING AS EXPECTED  Pt is resting comfortably with current PRN medication regimen.

## 2019-01-16 NOTE — DISCHARGE PLANNING
Anticipated Discharge Disposition: *[facility]    Action: LSW met w/ Codi from [facility] and introduced pt. Codi from [facility] assessed pt and informed this LSW that pt has been accepted. Pt has been approved for a two week stay at [facility]. Codi from [facility] will inform this LSW once transportation has been secured for pt's d/c. Codi from [facility] requests that all prescriptions be sent to [facility] at [address] [place] [address]. Codi from [facility] requests that physical prescriptions for any narcotics be given directly to pt. LSW to request this from MD. RN notified.     Barriers to Discharge: None    Plan: Pending transportation time.

## 2019-01-16 NOTE — PROGRESS NOTES
" Subjective:      No events. Pending [facility] assessment.    Objective:    Blood pressure 119/67, pulse 88, temperature 36.8 °C (98.3 °F), temperature source Oral, resp. rate 18, height 1.829 m (6' 0.01\"), weight 82.6 kg (182 lb 1.6 oz), SpO2 91 %.                Intake/Output Summary (Last 24 hours) at 01/16/19 0650  Last data filed at 01/16/19 0400   Gross per 24 hour   Intake             1380 ml   Output             1850 ml   Net             -470 ml       Comfortable, no distress  Neurologically and vascularly intact with palpable pedal pulses bilaterally.  Dressing C/D/I      Assessment:    s/p replant total knee arthroplasty.    Plan:      Cont current cares  [facility] assessment today  Please reschedule his outpt PT appt prior to discharge  F/U with me in 2 weeks.  "

## 2019-01-16 NOTE — DISCHARGE INSTRUCTIONS
Discharge Instructions    Discharged to home by car with relative. Discharged via wheelchair, hospital escort: Yes.  Special equipment needed: Not Applicable    Be sure to schedule a follow-up appointment with your primary care doctor or any specialists as instructed.     LAST OXYCODONE given at  LAST ASA given at     -Leave the bandage in place until your followup appointment in 2 weeks.  -Call if there is drainage beneath the bandage  -Use ice and elevation frequently to help with pain and swelling control  -Put as much weight as comfortable on the operative leg.  Use a walker to assist with balance.  -Take your blood clot prevention medication for 4 weeks after surgery.   [facility] to January 25th, 2019, checkin at 0930. RN informed.  Discharge Plan:   Diet Plan: Discussed  Activity Level: Discussed  Smoking Cessation Offered: Patient Refused  Confirmed Follow up Appointment: Patient to Call and Schedule Appointment  Confirmed Symptoms Management: Discussed  Medication Reconciliation Updated: Yes  Pneumococcal Vaccine Administered/Refused: Not given - Patient refused pneumococcal vaccine  Influenza Vaccine Indication: Patient Refuses    I understand that a diet low in cholesterol, fat, and sodium is recommended for good health. Unless I have been given specific instructions below for another diet, I accept this instruction as my diet prescription.   Other diet: Regular    Special Instructions: Discharge instructions for the Orthopedic Patient    Follow up with Primary Care Physician within 2 weeks of discharge to home, regarding:  Review of medications and diagnostic testing.  Surveillance for medical complications.  Workup and treatment of osteoporosis, if appropriate.     -Is this a Joint Replacement patient? Yes Total Joint Knee Replacement Discharge Instructions    Pain  - The goal is to slowly wean off the prescription pain medicine.  - Ice can be used for pain control.  20 minutes at a time is  recommended, and never directly against your skin or incision.  - Most patients are off the pain pills by 3 weeks; others may require a low level of pain medications for many months.  If your pain continues to be severe, follow up with your physician.  Infection    Knee joint infections; occur in fewer than 2% of patients. The most common causes of infection following total knee replacement surgery are from bacteria that enter the bloodstream during dental procedures, urinary tract infections, or skin infections. These bacteria can lodge around your knee replacement and cause an infection.  - Keep the incision as clean and dry as possible.  - Always wash your hands before touching your incision.  - Skin infections tend to develop around 7-10 days after surgery; most can be treated with oral antibiotics.  - Dental Care should be delayed for 3 months after surgery, your surgeon recommends taking a dose of antibiotics 1 hour prior to any dental procedure. After 2 years, most surgeons recommend antibiotics only before an extensive procedure.  Ask your surgeon what he recommends.  - Signs and symptoms of infection can include:  low grade fever, redness, pain, swelling and drainage from your incision.  Notify your surgeon immediately if you develop any of these symptoms.  Other instructions  - Bowel habits - constipation is extremely common and is caused by a combination of anesthesia, lack of mobility and pain medicine.  Use stool softeners or laxatives if necessary. It is important not to ignore this problem, as bowel obstructions can be a serious complication after joint replacement surgery.  - Mood/Energy Level - Many patients experience a lack of energy and endurance for up to 2-3 months after surgery.  Some may also feel down and can even become depressed.  This is likely due to the postoperative anemia, change in activity level, lack of sleep, pain medicine and just the emotional reaction to the surgery itself that  is a big disruption in a person’s life.  This usually passes.  If symptoms persist, follow up with your primary physician.  - Returning to work - Your surgeon will give you more specific instructions. Depending on the type of activities you perform, it may be 6 to 8 weeks before you return to work.   Generally, if you work a sedentary job requiring little standing or walking, most patients may return within 2-6 weeks.  Manual labor jobs involving walking, lifting and standing may take longer. Your surgeon’s office can provide a release to part-time or light duty work early on in your recovery and progress you to full duty as able.    - Driving - If your left knee was replaced and you have an automatic transmission, you may be able to begin driving in a week or so, provided you are no longer taking narcotic pain medication. If your right knee was replaced, avoid driving for 6 to 8 weeks. Remember that your reflexes may not be as sharp as before your surgery. Ask your surgeon for specific instructions.   - Avoiding falls - A fall during the first few weeks after surgery can damage your new knee and may result in a need for further surgery.   throw rugs and tack down loose carpeting.  Be aware of floor hazards such as pets, small objects or uneven surfaces.    - Airport Metal Detectors - The sensitivity of metal detectors varies and it is likely that your prosthesis will cause an alarm.  Inform the  of your artificial joint.  Diet  - Resume your normal diet as tolerated.  - It is important to achieve a healthy nutritional status by eating a well balanced diet on a regular basis.  - Your physician may recommend that you take iron and vitamin supplements.   - Continue to drink plenty of fluids.  Shower/Bathing  - You may shower as soon as you get home from the hospital unless otherwise instructed.  - Keep your incision out of water.  To keep the incision dry when showering, cover it with a plastic  bag or plastic wrap.  - Pat incision dry if it gets wet.  Don’t rub.  - Do not submerge in a bath until staples are out and the incision is completely healed. (Approximately 6-8 weeks)  Dressing Change:  Procedure (if recommended by your physician)  - Wash hands.  - Open all new dressing change materials.  - Remove old dressing and discard.  - Inspect incision for redness, increase in clear drainage, yellow/green drainage, odor and surrounding skin hot to touch.  -  ABD (large gauze) pad or “island dressing” by one corner and lay over the incision.  Be careful not to touch the inside of the dressing that will lay over the incision.  - Secure in place as instructed (Ace wrap or tape).    Swelling/Bruising    - Swelling can last from 3-6 months.  - Elevate your leg higher than your heart while reclining.   The first week you are home you should elevate your leg an equal amount of time, as you are active.    - Anti-inflammatory pills can be taken once you have stopped the blood thinners.  - The swelling is usually worse after you go home since you are upright for longer periods of time.  - Bruising is common and can involve the entire leg including the thigh, calf and even foot. Bruising often does not appear until after you arrive home and it can be quite dramatic- purple, black, and green.  The bruising you can see is not usually concerning and will subside without any treatment.      Blood Clot Prevention  Blood clots in the legs and the less common, but frightening, clots that travel to the lungs are a real focus of our preventative. Most patients are at standard risk for them, but those patients who are at higher risk include people who have had previous clots, a family history of clotting, smoking, diabetes, obesity, advanced age, use of estrogen and a sedentary lifestyle.    - Signs of blood clots in legs - Swelling in thigh, calf or ankle that does not go down with elevation.  Pain, heat and tenderness  in calf, back of calf or groin area.  NOTE: blood clots can occur in either leg.  - You have been receiving anticoagulant therapy (blood thinners) in the hospital and you may be instructed to continue at home depending on your risk factors.  - Your risk for developing a clot continues for up to 2-3 months after surgery.  You should avoid prolonged sitting and dehydration during that time (long air trips and car trips).  If you do take a trip during this time, please get up and move around every 1- 1.5 hours.  - If you are prescribed blood-thinning medication for home, follow instructions as directed. (Handouts provided if applicable).      Activity  Once home, you should continue to stay active. The key is to remember not to overdo it! While you can expect some good days and some bad days, you should notice a gradual improvement and a gradual increase in your endurance over the next 6 to 12 months. Exercise is a critical component of home care, particularly during the first few weeks after surgery.     - Normal activities of daily living You should be able to resume most within 3 to 6 weeks following surgery. Some pain with activity and at night is common for several weeks after surgery  -  Physical Therapy Exercises - Continue to do the exercises prescribed for at least two months after surgery. Riding a stationary bicycle can help maintain muscle tone and keep your knee flexible. Try to achieve the maximum degree of bending and extension possible. (handout provided by Therapist).  - Sexual Activity -. Your surgeon can tell you when it safe to resume sexual activity.    - Sleeping Positions - You can safely sleep on your back, on either side, or on your stomach.   - Other Activities - Walk as much as you like, but remember that walking is no substitute for the exercises your doctor and physical therapist will prescribe. Lower impact activities are preferred.  If you have specific questions, consult your Surgeon.     When to Call the Doctor   Call the physician if:   - Fever over 100.5? F  - Increased pain, drainage, redness, odor or heat around the incision area  - Shaking chills  - Increased knee pain with activity and rest  - Increased pain in calf, tenderness or redness above or below the knee  - Increased swelling of calf, ankle, foot  - Sudden increased shortness of breath, sudden onset of chest pain, localized chest pain with coughing  - Incision opening  Or, if there are any questions or concerns about medications or care.       -Is this patient being discharged with medication to prevent blood clots?  No    · Is patient discharged on Warfarin / Coumadin?   No     Depression / Suicide Risk    As you are discharged from this [facility] facility, it is important to learn how to keep safe from harming yourself.    Recognize the warning signs:  · Abrupt changes in personality, positive or negative- including increase in energy   · Giving away possessions  · Change in eating patterns- significant weight changes-  positive or negative  · Change in sleeping patterns- unable to sleep or sleeping all the time   · Unwillingness or inability to communicate  · Depression  · Unusual sadness, discouragement and loneliness  · Talk of wanting to die  · Neglect of personal appearance   · Rebelliousness- reckless behavior  · Withdrawal from people/activities they love  · Confusion- inability to concentrate     If you or a loved one observes any of these behaviors or has concerns about self-harm, here's what you can do:  · Talk about it- your feelings and reasons for harming yourself  · Remove any means that you might use to hurt yourself (examples: pills, rope, extension cords, firearm)  · Get professional help from the community (Mental Health, Substance Abuse, psychological counseling)  · Do not be alone:Call your Safe Contact- someone whom you trust who will be there for you.  · Call your local CRISIS HOTLINE 486-7771 or  [phone]  · Call your local Children's Mobile Crisis Response Team [place] (647) 221-0790 or www.DNART LIMITADA  · Call the toll free National Suicide Prevention Hotlines   · National Suicide Prevention Lifeline 143-905-QUEJ (1202)  · National Hope Line Network 800-SUICIDE (580-3308)    Doxycycline tablets or capsules  What is this medicine?  DOXYCYCLINE (dox karissa andujar) is a tetracycline antibiotic. It kills certain bacteria or stops their growth. It is used to treat many kinds of infections, like dental, skin, respiratory, and urinary tract infections. It also treats acne, Lyme disease, malaria, and certain sexually transmitted infections.  This medicine may be used for other purposes; ask your health care provider or pharmacist if you have questions.  COMMON BRAND NAME(S): Acticlate, Adoxa, Adoxa CK, Adoxa Bryant, Adoxa TT, Alodox, Avidoxy, Doxal, Mondoxyne NL, Monodox, Morgidox 1x, Morgidox 1x Kit, Morgidox 2x, Morgidox 2x Kit, NutriDox, Ocudox, TARGADOX, Vibra-Tabs, Vibramycin  What should I tell my health care provider before I take this medicine?  They need to know if you have any of these conditions:  -liver disease  -long exposure to sunlight like working outdoors  -stomach problems like colitis  -an unusual or allergic reaction to doxycycline, tetracycline antibiotics, other medicines, foods, dyes, or preservatives  -pregnant or trying to get pregnant  -breast-feeding  How should I use this medicine?  Take this medicine by mouth with a full glass of water. Follow the directions on the prescription label. It is best to take this medicine without food, but if it upsets your stomach take it with food. Take your medicine at regular intervals. Do not take your medicine more often than directed. Take all of your medicine as directed even if you think you are better. Do not skip doses or stop your medicine early.  Talk to your pediatrician regarding the use of this medicine in children. While this drug  may be prescribed for selected conditions, precautions do apply.  Overdosage: If you think you have taken too much of this medicine contact a poison control center or emergency room at once.  NOTE: This medicine is only for you. Do not share this medicine with others.  What if I miss a dose?  If you miss a dose, take it as soon as you can. If it is almost time for your next dose, take only that dose. Do not take double or extra doses.  What may interact with this medicine?  -antacids  -barbiturates  -birth control pills  -bismuth subsalicylate  -carbamazepine  -methoxyflurane  -other antibiotics  -phenytoin  -vitamins that contain iron  -warfarin  This list may not describe all possible interactions. Give your health care provider a list of all the medicines, herbs, non-prescription drugs, or dietary supplements you use. Also tell them if you smoke, drink alcohol, or use illegal drugs. Some items may interact with your medicine.  What should I watch for while using this medicine?  Tell your doctor or health care professional if your symptoms do not improve.  Do not treat diarrhea with over the counter products. Contact your doctor if you have diarrhea that lasts more than 2 days or if it is severe and watery.  Do not take this medicine just before going to bed. It may not dissolve properly when you lay down and can cause pain in your throat. Drink plenty of fluids while taking this medicine to also help reduce irritation in your throat.  This medicine can make you more sensitive to the sun. Keep out of the sun. If you cannot avoid being in the sun, wear protective clothing and use sunscreen. Do not use sun lamps or tanning beds/booths.  Birth control pills may not work properly while you are taking this medicine. Talk to your doctor about using an extra method of birth control.  If you are being treated for a sexually transmitted infection, avoid sexual contact until you have finished your treatment. Your sexual  partner may also need treatment.  Avoid antacids, aluminum, calcium, magnesium, and iron products for 4 hours before and 2 hours after taking a dose of this medicine.  If you are using this medicine to prevent malaria, you should still protect yourself from contact with mosquitos. Stay in screened-in areas, use mosquito nets, keep your body covered, and use an insect repellent.  What side effects may I notice from receiving this medicine?  Side effects that you should report to your doctor or health care professional as soon as possible:  -allergic reactions like skin rash, itching or hives, swelling of the face, lips, or tongue  -difficulty breathing  -fever  -itching in the rectal or genital area  -pain on swallowing  -redness, blistering, peeling or loosening of the skin, including inside the mouth  -severe stomach pain or cramps  -unusual bleeding or bruising  -unusually weak or tired  -yellowing of the eyes or skin  Side effects that usually do not require medical attention (report to your doctor or health care professional if they continue or are bothersome):  -diarrhea  -loss of appetite  -nausea, vomiting  This list may not describe all possible side effects. Call your doctor for medical advice about side effects. You may report side effects to FDA at 7-359-FDA-2158.  Where should I keep my medicine?  Keep out of the reach of children.  Store at room temperature, below 30 degrees C (86 degrees F). Protect from light. Keep container tightly closed. Throw away any unused medicine after the expiration date. Taking this medicine after the expiration date can make you seriously ill.  NOTE: This sheet is a summary. It may not cover all possible information. If you have questions about this medicine, talk to your doctor, pharmacist, or health care provider.  © 2018 [employer] Standard (2017-01-18 17:11:22)    Meloxicam tablets  What is this medicine?  MELOXICAM (celina OX i cam) is a non-steroidal anti-inflammatory  drug (NSAID). It is used to reduce swelling and to treat pain. It may be used for osteoarthritis, rheumatoid arthritis, or juvenile rheumatoid arthritis.  This medicine may be used for other purposes; ask your health care provider or pharmacist if you have questions.  COMMON BRAND NAME(S): Gurdeep  What should I tell my health care provider before I take this medicine?  They need to know if you have any of these conditions:  -bleeding disorders  -cigarette smoker  -coronary artery bypass graft (CABG) surgery within the past 2 weeks  -drink more than 3 alcohol-containing drinks per day  -heart disease  -high blood pressure  -history of stomach bleeding  -kidney disease  -liver disease  -lung or breathing disease, like asthma  -stomach or intestine problems  -an unusual or allergic reaction to meloxicam, aspirin, other NSAIDs, other medicines, foods, dyes, or preservatives  -pregnant or trying to get pregnant  -breast-feeding  How should I use this medicine?  Take this medicine by mouth with a full glass of water. Follow the directions on the prescription label. You can take it with or without food. If it upsets your stomach, take it with food. Take your medicine at regular intervals. Do not take it more often than directed. Do not stop taking except on your doctor's advice.  A special MedGuide will be given to you by the pharmacist with each prescription and refill. Be sure to read this information carefully each time.  Talk to your pediatrician regarding the use of this medicine in children. While this drug may be prescribed for selected conditions, precautions do apply.  Patients over 65 years old may have a stronger reaction and need a smaller dose.  Overdosage: If you think you have taken too much of this medicine contact a poison control center or emergency room at once.  NOTE: This medicine is only for you. Do not share this medicine with others.  What if I miss a dose?  If you miss a dose, take it as soon as you  can. If it is almost time for your next dose, take only that dose. Do not take double or extra doses.  What may interact with this medicine?  Do not take this medicine with any of the following medications:  -cidofovir  -ketorolac  This medicine may also interact with the following medications:  -aspirin and aspirin-like medicines  -certain medicines for blood pressure, heart disease, irregular heart beat  -certain medicines for depression, anxiety, or psychotic disturbances  -certain medicines that treat or prevent blood clots like warfarin, enoxaparin, dalteparin, apixaban, dabigatran, rivaroxaban  -cyclosporine  -digoxin  -diuretics  -methotrexate  -other NSAIDs, medicines for pain and inflammation, like ibuprofen and naproxen  -pemetrexed  This list may not describe all possible interactions. Give your health care provider a list of all the medicines, herbs, non-prescription drugs, or dietary supplements you use. Also tell them if you smoke, drink alcohol, or use illegal drugs. Some items may interact with your medicine.  What should I watch for while using this medicine?  Tell your doctor or healthcare professional if your symptoms do not start to get better or if they get worse.  Do not take other medicines that contain aspirin, ibuprofen, or naproxen with this medicine. Side effects such as stomach upset, nausea, or ulcers may be more likely to occur. Many medicines available without a prescription should not be taken with this medicine.  This medicine can cause ulcers and bleeding in the stomach and intestines at any time during treatment. This can happen with no warning and may cause death. There is increased risk with taking this medicine for a long time. Smoking, drinking alcohol, older age, and poor health can also increase risks. Call your doctor right away if you have stomach pain or blood in your vomit or stool.  This medicine does not prevent heart attack or stroke. In fact, this medicine may  increase the chance of a heart attack or stroke. The chance may increase with longer use of this medicine and in people who have heart disease. If you take aspirin to prevent heart attack or stroke, talk with your doctor or health care professional.  What side effects may I notice from receiving this medicine?  Side effects that you should report to your doctor or health care professional as soon as possible:  -allergic reactions like skin rash, itching or hives, swelling of the face, lips, or tongue  -nausea, vomiting  -signs and symptoms of a blood clot such as breathing problems; changes in vision; chest pain; severe, sudden headache; pain, swelling, warmth in the leg; trouble speaking; sudden numbness or weakness of the face, arm, or leg  -signs and symptoms of bleeding such as bloody or black, tarry stools; red or dark-brown urine; spitting up blood or brown material that looks like coffee grounds; red spots on the skin; unusual bruising or bleeding from the eye, gums, or nose  -signs and symptoms of liver injury like dark yellow or brown urine; general ill feeling or flu-like symptoms; light-colored stools; loss of appetite; nausea; right upper belly pain; unusually weak or tired; yellowing of the eyes or skin  -signs and symptoms of stroke like changes in vision; confusion; trouble speaking or understanding; severe headaches; sudden numbness or weakness of the face, arm, or leg; trouble walking; dizziness; loss of balance or coordination  Side effects that usually do not require medical attention (report to your doctor or health care professional if they continue or are bothersome):  -constipation  -diarrhea  -gas  This list may not describe all possible side effects. Call your doctor for medical advice about side effects. You may report side effects to FDA at 1-858-FDA-4237.  Where should I keep my medicine?  Keep out of the reach of children.  Store at room temperature between 15 and 30 degrees C (59 and 86  degrees F). Throw away any unused medicine after the expiration date.  NOTE: This sheet is a summary. It may not cover all possible information. If you have questions about this medicine, talk to your doctor, pharmacist, or health care provider.  © 2018 [employer]/Gold Standard (2017-01-18 19:28:16)    Methocarbamol tablets  What is this medicine?  METHOCARBAMOL (meth oh ESTER ba mole) helps to relieve pain and stiffness in muscles caused by strains, sprains, or other injury to your muscles.  This medicine may be used for other purposes; ask your health care provider or pharmacist if you have questions.  COMMON BRAND NAME(S): Robaxin  What should I tell my health care provider before I take this medicine?  They need to know if you have any of these conditions:  -kidney disease  -seizures  -an unusual or allergic reaction to methocarbamol, other medicines, foods, dyes, or preservatives  -pregnant or trying to get pregnant  -breast-feeding  How should I use this medicine?  Take this medicine by mouth with a full glass of water. Follow the directions on the prescription label. Take your medicine at regular intervals. Do not take your medicine more often than directed.  Talk to your pediatrician regarding the use of this medicine in children. Special care may be needed.  Overdosage: If you think you have taken too much of this medicine contact a poison control center or emergency room at once.  NOTE: This medicine is only for you. Do not share this medicine with others.  What if I miss a dose?  If you miss a dose, take it as soon as you can. If it is almost time for your next dose, take only the next dose. Do not take double or extra doses.  What may interact with this medicine?  Do not take this medication with any of the following medicines:  -narcotic medicines for cough  This medicine may also interact with the following medications:  -alcohol  -antihistamines for allergy, cough and cold  -certain medicines for anxiety  or sleep  -certain medicines for depression like amitriptyline, fluoxetine, sertraline  -certain medicines for seizures like phenobarbital, primidone  -cholinesterase inhibitors like neostigmine, ambenonium, and pyridostigmine bromide  -general anesthetics like halothane, isoflurane, methoxyflurane, propofol  -local anesthetics like lidocaine, pramoxine, tetracaine  -medicines that relax muscles for surgery  -narcotic medicines for pain  -phenothiazines like chlorpromazine, mesoridazine, prochlorperazine, thioridazine  This list may not describe all possible interactions. Give your health care provider a list of all the medicines, herbs, non-prescription drugs, or dietary supplements you use. Also tell them if you smoke, drink alcohol, or use illegal drugs. Some items may interact with your medicine.  What should I watch for while using this medicine?  Tell your doctor or health care professional if your symptoms do not start to get better or if they get worse.  You may get drowsy or dizzy. Do not drive, use machinery, or do anything that needs mental alertness until you know how this medicine affects you. Do not stand or sit up quickly, especially if you are an older patient. This reduces the risk of dizzy or fainting spells. Alcohol may interfere with the effect of this medicine. Avoid alcoholic drinks.  If you are taking another medicine that also causes drowsiness, you may have more side effects. Give your health care provider a list of all medicines you use. Your doctor will tell you how much medicine to take. Do not take more medicine than directed. Call emergency for help if you have problems breathing or unusual sleepiness.  What side effects may I notice from receiving this medicine?  Side effects that you should report to your doctor or health care professional as soon as possible:  -allergic reactions like skin rash, itching or hives, swelling of the face, lips, or tongue  -breathing  problems  -confusion  -seizures  -unusually weak or tired  Side effects that usually do not require medical attention (report to your doctor or health care professional if they continue or are bothersome):  -dizziness  -headache  -metallic taste  -tiredness  -upset stomach  This list may not describe all possible side effects. Call your doctor for medical advice about side effects. You may report side effects to FDA at 1-689-MKA-0309.  Where should I keep my medicine?  Keep out of the reach of children.  Store at room temperature between 20 and 25 degrees C (68 and 77 degrees F). Keep container tightly closed. Throw away any unused medicine after the expiration date.  NOTE: This sheet is a summary. It may not cover all possible information. If you have questions about this medicine, talk to your doctor, pharmacist, or health care provider.  © 2018 [employer] (2016-09-27 13:11:54)    Oxycodone tablets or capsules  What is this medicine?  OXYCODONE (ox i KOE done) is a pain reliever. It is used to treat moderate to severe pain.  This medicine may be used for other purposes; ask your health care provider or pharmacist if you have questions.  COMMON BRAND NAME(S): Dazidox, Endocodone, Oxaydo, OXECTA, OxyIR, Percolone, Roxicodone, ROXYBOND  What should I tell my health care provider before I take this medicine?  They need to know if you have any of these conditions:  -[place]'s disease  -brain tumor  -head injury  -heart disease  -history of drug or alcohol abuse problem  -if you often drink alcohol  -kidney disease  -liver disease  -lung or breathing disease, like asthma  -mental illness  -pancreatic disease  -seizures  -thyroid disease  -an unusual or allergic reaction to oxycodone, codeine, hydrocodone, morphine, other medicines, foods, dyes, or preservatives  -pregnant or trying to get pregnant  -breast-feeding  How should I use this medicine?  Take this medicine by mouth with a glass of water. Follow the  directions on the prescription label. You can take it with or without food. If it upsets your stomach, take it with food. Take your medicine at regular intervals. Do not take it more often than directed. Do not stop taking except on your doctor's advice.  Some brands of this medicine, like Oxecta, have special instructions. Ask your doctor or pharmacist if these directions are for you: Do not cut, crush or chew this medicine. Swallow only one tablet at a time. Do not wet, soak, or lick the tablet before you take it.  A special MedGuide will be given to you by the pharmacist with each prescription and refill. Be sure to read this information carefully each time.  Talk to your pediatrician regarding the use of this medicine in children. Special care may be needed.  Overdosage: If you think you have taken too much of this medicine contact a poison control center or emergency room at once.  NOTE: This medicine is only for you. Do not share this medicine with others.  What if I miss a dose?  If you miss a dose, take it as soon as you can. If it is almost time for your next dose, take only that dose. Do not take double or extra doses.  What may interact with this medicine?  This medicine may interact with the following medications:  -alcohol  -antihistamines for allergy, cough and cold  -antiviral medicines for HIV or AIDS  -atropine  -certain antibiotics like clarithromycin, erythromycin, linezolid, rifampin  -certain medicines for anxiety or sleep  -certain medicines for bladder problems like oxybutynin, tolterodine  -certain medicines for depression like amitriptyline, fluoxetine, sertraline  -certain medicines for fungal infections like ketoconazole, itraconazole, voriconazole  -certain medicines for migraine headache like almotriptan, eletriptan, frovatriptan, naratriptan, rizatriptan, sumatriptan, zolmitriptan  -certain medicines for nausea or vomiting like dolasetron, ondansetron, palonosetron  -certain medicines  for Parkinson's disease like benztropine, trihexyphenidyl  -certain medicines for seizures like phenobarbital, phenytoin, primidone  -certain medicines for stomach problems like dicyclomine, hyoscyamine  -certain medicines for travel sickness like scopolamine  -diuretics  -general anesthetics like halothane, isoflurane, methoxyflurane, propofol  -ipratropium  -local anesthetics like lidocaine, pramoxine, tetracaine  -MAOIs like Carbex, Eldepryl, Marplan, Nardil, and Parnate  -medicines that relax muscles for surgery  -methylene blue  -nilotinib  -other narcotic medicines for pain or cough  -phenothiazines like chlorpromazine, mesoridazine, prochlorperazine, thioridazine  This list may not describe all possible interactions. Give your health care provider a list of all the medicines, herbs, non-prescription drugs, or dietary supplements you use. Also tell them if you smoke, drink alcohol, or use illegal drugs. Some items may interact with your medicine.  What should I watch for while using this medicine?  Tell your doctor or health care professional if your pain does not go away, if it gets worse, or if you have new or a different type of pain. You may develop tolerance to the medicine. Tolerance means that you will need a higher dose of the medicine for pain relief. Tolerance is normal and is expected if you take this medicine for a long time.  Do not suddenly stop taking your medicine because you may develop a severe reaction. Your body becomes used to the medicine. This does NOT mean you are addicted. Addiction is a behavior related to getting and using a drug for a non-medical reason. If you have pain, you have a medical reason to take pain medicine. Your doctor will tell you how much medicine to take. If your doctor wants you to stop the medicine, the dose will be slowly lowered over time to avoid any side effects.  There are different types of narcotic medicines (opiates). If you take more than one type at the  same time or if you are taking another medicine that also causes drowsiness, you may have more side effects. Give your health care provider a list of all medicines you use. Your doctor will tell you how much medicine to take. Do not take more medicine than directed. Call emergency for help if you have problems breathing or unusual sleepiness.  You may get drowsy or dizzy. Do not drive, use machinery, or do anything that needs mental alertness until you know how the medicine affects you. Do not stand or sit up quickly, especially if you are an older patient. This reduces the risk of dizzy or fainting spells. Alcohol may interfere with the effect of this medicine. Avoid alcoholic drinks.  This medicine will cause constipation. Try to have a bowel movement at least every 2 to 3 days. If you do not have a bowel movement for 3 days, call your doctor or health care professional.  Your mouth may get dry. Chewing sugarless gum or sucking hard candy, and drinking plenty of water may help. Contact your doctor if the problem does not go away or is severe.  What side effects may I notice from receiving this medicine?  Side effects that you should report to your doctor or health care professional as soon as possible:  -allergic reactions like skin rash, itching or hives, swelling of the face, lips, or tongue  -breathing problems  -confusion  -signs and symptoms of low blood pressure like dizziness; feeling faint or lightheaded, falls; unusually weak or tired  -trouble passing urine or change in the amount of urine  -trouble swallowing  Side effects that usually do not require medical attention (report to your doctor or health care professional if they continue or are bothersome):  -constipation  -dry mouth  -nausea, vomiting  -tiredness  This list may not describe all possible side effects. Call your doctor for medical advice about side effects. You may report side effects to FDA at 1-194-FDA-3025.  Where should I keep my  medicine?  Keep out of the reach of children. This medicine can be abused. Keep your medicine in a safe place to protect it from theft. Do not share this medicine with anyone. Selling or giving away this medicine is dangerous and against the law.  Store at room temperature between 15 and 30 degrees C (59 and 86 degrees F). Protect from light. Keep container tightly closed.  This medicine may cause accidental overdose and death if it is taken by other adults, children, or pets. Flush any unused medicine down the toilet to reduce the chance of harm. Do not use the medicine after the expiration date.  NOTE: This sheet is a summary. It may not cover all possible information. If you have questions about this medicine, talk to your doctor, pharmacist, or health care provider.  © 2018 [employer] (2016-11-01 16:55:57)    Tramadol tablets  What is this medicine?  TRAMADOL (TRA ma dole) is a pain reliever. It is used to treat moderate to severe pain in adults.  This medicine may be used for other purposes; ask your health care provider or pharmacist if you have questions.  COMMON BRAND NAME(S): Ultram  What should I tell my health care provider before I take this medicine?  They need to know if you have any of these conditions:  -brain tumor  -depression  -drug abuse or addiction  -head injury  -if you frequently drink alcohol containing drinks  -kidney disease or trouble passing urine  -liver disease  -lung disease, asthma, or breathing problems  -seizures or epilepsy  -suicidal thoughts, plans, or attempt; a previous suicide attempt by you or a family member  -an unusual or allergic reaction to tramadol, codeine, other medicines, foods, dyes, or preservatives  -pregnant or trying to get pregnant  -breast-feeding  How should I use this medicine?  Take this medicine by mouth with a full glass of water. Follow the directions on the prescription label. You can take it with or without food. If it upsets your stomach,  take it with food. Do not take your medicine more often than directed.  A special MedGuide will be given to you by the pharmacist with each prescription and refill. Be sure to read this information carefully each time.  Talk to your pediatrician regarding the use of this medicine in children. Special care may be needed.  Overdosage: If you think you have taken too much of this medicine contact a poison control center or emergency room at once.  NOTE: This medicine is only for you. Do not share this medicine with others.  What if I miss a dose?  If you miss a dose, take it as soon as you can. If it is almost time for your next dose, take only that dose. Do not take double or extra doses.  What may interact with this medicine?  Do not take this medication with any of the following medicines:  -MAOIs like Carbex, Eldepryl, Marplan, Nardil, and Parnate  This medicine may also interact with the following medications:  -alcohol  -antihistamines for allergy, cough and cold  -certain medicines for anxiety or sleep  -certain medicines for depression like amitriptyline, fluoxetine, sertraline  -certain medicines for migraine headache like almotriptan, eletriptan, frovatriptan, naratriptan, rizatriptan, sumatriptan, zolmitriptan  -certain medicines for seizures like carbamazepine, oxcarbazepine, phenobarbital, primidone  -certain medicines that treat or prevent blood clots like warfarin  -digoxin  -furazolidone  -general anesthetics like halothane, isoflurane, methoxyflurane, propofol  -linezolid  -local anesthetics like lidocaine, pramoxine, tetracaine  -medicines that relax muscles for surgery  -other narcotic medicines for pain or cough  -phenothiazines like chlorpromazine, mesoridazine, prochlorperazine, thioridazine  -procarbazine  This list may not describe all possible interactions. Give your health care provider a list of all the medicines, herbs, non-prescription drugs, or dietary supplements you use. Also tell them  if you smoke, drink alcohol, or use illegal drugs. Some items may interact with your medicine.  What should I watch for while using this medicine?  Tell your doctor or health care professional if your pain does not go away, if it gets worse, or if you have new or a different type of pain. You may develop tolerance to the medicine. Tolerance means that you will need a higher dose of the medicine for pain relief. Tolerance is normal and is expected if you take this medicine for a long time.  Do not suddenly stop taking your medicine because you may develop a severe reaction. Your body becomes used to the medicine. This does NOT mean you are addicted. Addiction is a behavior related to getting and using a drug for a non-medical reason. If you have pain, you have a medical reason to take pain medicine. Your doctor will tell you how much medicine to take. If your doctor wants you to stop the medicine, the dose will be slowly lowered over time to avoid any side effects.  There are different types of narcotic medicines (opiates). If you take more than one type at the same time or if you are taking another medicine that also causes drowsiness, you may have more side effects. Give your health care provider a list of all medicines you use. Your doctor will tell you how much medicine to take. Do not take more medicine than directed. Call emergency for help if you have problems breathing or unusual sleepiness.  You may get drowsy or dizzy. Do not drive, use machinery, or do anything that needs mental alertness until you know how this medicine affects you. Do not stand or sit up quickly, especially if you are an older patient. This reduces the risk of dizzy or fainting spells. Alcohol can increase or decrease the effects of this medicine. Avoid alcoholic drinks.  You may have constipation. Try to have a bowel movement at least every 2 to 3 days. If you do not have a bowel movement for 3 days, call your doctor or health care  professional.  Your mouth may get dry. Chewing sugarless gum or sucking hard candy, and drinking plenty of water may help. Contact your doctor if the problem does not go away or is severe.  What side effects may I notice from receiving this medicine?  Side effects that you should report to your doctor or health care professional as soon as possible:  -allergic reactions like skin rash, itching or hives, swelling of the face, lips, or tongue  -breathing problems  -confusion  -seizures  -signs and symptoms of low blood pressure like dizziness; feeling faint or lightheaded, falls; unusually weak or tired  -trouble passing urine or change in the amount of urine  Side effects that usually do not require medical attention (report to your doctor or health care professional if they continue or are bothersome):  -constipation  -dry mouth  -nausea, vomiting  -tiredness  This list may not describe all possible side effects. Call your doctor for medical advice about side effects. You may report side effects to FDA at 4-815-FDA-4496.  Where should I keep my medicine?  Keep out of the reach of children.  This medicine may cause accidental overdose and death if it taken by other adults, children, or pets. Mix any unused medicine with a substance like cat litter or coffee grounds. Then throw the medicine away in a sealed container like a sealed bag or a coffee can with a lid. Do not use the medicine after the expiration date.  Store at room temperature between 15 and 30 degrees C (59 and 86 degrees F).  NOTE: This sheet is a summary. It may not cover all possible information. If you have questions about this medicine, talk to your doctor, pharmacist, or health care provider.  © 2018 [employer] (2016-09-11 09:00:04)

## 2019-01-16 NOTE — DISCHARGE PLANNING
Anticipated Discharge Disposition: [facility]    Action: LSW called at rescheduled pt's PT appointment today at 1400 w/ [facility] to January 25th, 2019, checkin at 0930. RN informed.    LSW faxed pt's written prescriptions to [facility] [phone] and handed written prescriptions directly to pt.      Barriers to Discharge: None     Plan: Pending transportation confirmation    UPDATE: [facility] will  pt at entrance of ER at 1330 and requests pt be dressed, wheeled down, and ready to go. RN and pt notified.

## 2019-01-25 ENCOUNTER — PHYSICAL THERAPY (OUTPATIENT)
Dept: PHYSICAL THERAPY | Facility: REHABILITATION | Age: 59
End: 2019-01-25
Attending: ORTHOPAEDIC SURGERY
Payer: MEDICAID

## 2019-01-25 DIAGNOSIS — Z96.652 S/P REVISION OF TOTAL KNEE, LEFT: ICD-10-CM

## 2019-01-25 PROCEDURE — 97162 PT EVAL MOD COMPLEX 30 MIN: CPT

## 2019-01-25 ASSESSMENT — ENCOUNTER SYMPTOMS
PAIN SCALE AT HIGHEST: 10
PAIN SCALE AT LOWEST: 4
PAIN SCALE: 9

## 2019-01-25 NOTE — OP THERAPY EVALUATION
Outpatient Physical Therapy  INITIAL EVALUATION    [facility] Physical Therapy [address].  [address]  [place] [address]  Phone:  [phone]  Fax:  [phone]    Date of Evaluation: 2019    Patient: Korey Davis  YOB: 1960  MRN: [id number]     Referring Provider: Pramod Boston M.D.  [address] [address]   Referring Diagnosis Presence of left artificial knee joint [Z96.652]     Time Calculation  Start time: 945  Stop time:  Time Calculation (min): 30 minutes     Physical Therapy Occurrence Codes    Date of onset of impairment:  18   Date physical therapy care plan established or reviewed:  19   Date physical therapy treatment started:  19          Chief Complaint: Knee Problem    Visit Diagnoses     ICD-10-CM   1. S/P revision of total knee, left Z96.652         Subjective   History of Present Illness:     History of chief complaint:  Chronic L knee pain with eight prior surgeries. Prior prosthesis became infected and patient was partial weight bearing with antibiotic spacer for one month prior to latest surgery(per pt. History)  Patient underwent L TKE 18 and was put in a cast after and hospital until 19.    Prior level of function:  Unemployed    Pain:     Current pain ratin    At best pain ratin    At worst pain rating:  10    Location:  Medial /lateral knee pain    Aggravating factors:  Up > 7/night   Walking immediate  Sit > 15'        Past Medical History:   Diagnosis Date   • Arthritis     generalized, knees   • Asthma    • Back pain 2018    neck, knees, 8.5/10   • Bilateral knee pain 2018   • Breath shortness     18-Denies.   • COPD (chronic obstructive pulmonary disease) ([facility])     18-Pt denies.   • Dental disorder     upper partial, doesn't use   • Diabetes ([facility])     18-does not check glucose at home. Refuses to take meds.    • Pneumonia 2017   • Urinary bladder disorder      reports frequency   • Urinary incontinence      Past Surgical History:   Procedure Laterality Date   • CAST APPLICATION Left 12/30/2018    Procedure: CAST APPLICATION;  Surgeon: Pramod Boston M.D.;  Location: SURGERY [facility];  Service: Orthopedics   • KNEE REVISION TOTAL Left 12/27/2018    Procedure: KNEE REVISION TOTAL;  Surgeon: Pramod Boston M.D.;  Location: SURGERY [facility];  Service: Orthopedics   • KNEE REVISION TOTAL Left 8/21/2018    Procedure: KNEE REVISION TOTAL;  Surgeon: Pramod Boston M.D.;  Location: SURGERY [facility];  Service: Orthopedics   • COLONOSCOPY  07/2018   • HAND SURGERY Right 2016    unsuccessful tendon repair middle finger.   • KNEE REPLACEMENT, TOTAL  12/1/2011    [facility]   • KNEE ARTHROSCOPY Left [id number]    note 6 total left knee surgeries  in [place]       Precautions:       Objective   Observation and functional movement:  Severe pain with antalgia with bent knee mid stance    Range of motion and strength:    30-90--severe pain  Poor quad recruitment    Palpation and joint mobility:     Fair- scar mobility  Hypomobile with a/p mobility            Therapeutic Treatments and Modalities:     Therapeutic Treatment and Modalities Summary:   Post Chain Strengthening:issued h/o and instructed patient to purchase ball for HEP    Ball in/out focus on hamstrings  Ball bridges x 30 x 5  Balloon smash supine,standing and prone  Instructed prone with foot off edge of bed 5/day            Assessment, Response and Plan:   Assessment details:  S/p L TKR 12/.27/18 with significant pain limiting tolerance to motion.  No sign of infection with primary closure of incision.  Fair scap mobility with noted hypomobile tibio-fem mobility that increased toward TKE and flexion.  Patient improved to 15-95 deg after eval.  Patient should do well for goals if compliant with POC  Prognosis: fair    Goals:   Short Term Goals:   Sleep through night  -5-100 knee rom to allow for  easier and more functional transfers into bed and for toileting  Walk> 100 ft with spc w/o increased pain  Up/down  1 curb w/ a/d w/ mod I  womac < 60%  Short term goal time span:  4-6 weeks      Long Term Goals:      -0-115 knee rom to allow for easier and more functional transfers into car   Walk> 5min.  with spc w/o increased pain  Up/down  >10 steps w/ a/d w/ mod I or one rail  womac < 30%  Long term goal time span:  2-4 months    Plan:   Therapy options:  Physical therapy treatment to continue  Planned therapy interventions:  Therapeutic Exercise (CPT 54046), Gait Training (CPT 41509) and E Stim Unattended (CPT 55500)  Frequency:  2x week  Duration in weeks:  12  Duration in visits:  20  Plan details:  Post chain strength, bike, gait trg russian to vmo/gastroc      Functional Limitations and Severity Modifiers  PT Functional Assessment Tool Used: WOMAC  PT Functional Assessment Score: 70% disability     Referring provider co-signature:  I have reviewed this plan of care and my co-signature certifies the need for services.      Physician Signature: ________________________________ Date: ______________

## 2019-01-30 ENCOUNTER — APPOINTMENT (OUTPATIENT)
Dept: PHYSICAL THERAPY | Facility: REHABILITATION | Age: 59
End: 2019-01-30
Attending: ORTHOPAEDIC SURGERY
Payer: MEDICAID

## 2019-01-30 ENCOUNTER — APPOINTMENT (OUTPATIENT)
Dept: PHYSICAL THERAPY | Facility: REHABILITATION | Age: 59
End: 2019-01-30
Payer: MEDICAID

## 2019-02-04 ENCOUNTER — APPOINTMENT (OUTPATIENT)
Dept: PHYSICAL THERAPY | Facility: REHABILITATION | Age: 59
End: 2019-02-04
Payer: MEDICAID

## 2019-02-11 ENCOUNTER — PHYSICAL THERAPY (OUTPATIENT)
Dept: PHYSICAL THERAPY | Facility: REHABILITATION | Age: 59
End: 2019-02-11
Attending: ORTHOPAEDIC SURGERY
Payer: MEDICAID

## 2019-02-11 DIAGNOSIS — Z96.652 S/P REVISION OF TOTAL KNEE, LEFT: ICD-10-CM

## 2019-02-11 PROCEDURE — 97110 THERAPEUTIC EXERCISES: CPT

## 2019-02-11 NOTE — OP THERAPY DAILY TREATMENT
"  Outpatient Physical Therapy  DAILY TREATMENT     [facility] Physical [address].  [address]  [place] [address]  Phone:  [phone]  Fax:  [phone]    Date: 02/11/2019    Patient: Korey Davis  YOB: 1960  MRN: [id number]     Time Calculation  Start time: 1100  Stop time: 1130 Time Calculation (min): 30 minutes     Chief Complaint: Knee Problem and Post-Op Pain    Visit #: 2    SUBJECTIVE:  'Whenever I sit for just a few minutes it gets really stiff\"    OBJECTIVE:  Current objective measures:   Gait: with and without rollator - heel up L, L knee flexed, reduced stance time L, heavy use BUE when WB L          Therapeutic Exercises (CPT 12544):     1. Nustepper L3, 5 min    2. Gastroc stretch on wedge, 30 sec. x 3    3. Ball bridges, 15 x 2    4. Quad sets with folded pillow behind knee, 10 x 2 with 5 sec. hold    5. Hamstring curls/ Knee AAROM with BLE on theraball, 20 x 2    6. Seated long arc quad, (not able to perform through full range), advised to perform for 1-2 minutes every 30 minutes or so when seated and before standing up from prolonged sitting    7. Knee extension in standing with Rollator, Advised for moderate stretch, hold for 10-20 sec. ea. time, 10 x 2      Therapeutic Exercise Summary:         Time-based treatments/modalities:  Therapeutic exercise minutes (CPT 55106): 28 minutes       ASSESSMENT:   Response to treatment: Impaired gait contributing to distal hamstring/proximal gastroc tightness and discomfort. Symptoms improved with stretching and ROM exercises.    PLAN/RECOMMENDATIONS:   Plan for treatment: therapy treatment to continue next visit.  Planned interventions for next visit: continue with current treatment.      "

## 2019-02-11 NOTE — OP THERAPY DAILY TREATMENT
"  Outpatient Physical Therapy  DAILY TREATMENT     [facility] Physical [address].  [address]  [place] [address]  Phone:  [phone]  Fax:  [phone]    Date: 02/11/2019    Patient: Korey Davis  YOB: 1960  MRN: [id number]     Time Calculation             Chief Complaint: No chief complaint on file.    Visit #: 2    SUBJECTIVE:  ***    OBJECTIVE:  Current objective measures: ***        Exercises/Treatment  Time-based treatments/modalities:          Pain rating before treatment: {PAIN NUMBERS_1-10:21980}  Pain rating after treatment: {PAIN NUMBERS_1-10:33193}    ASSESSMENT:   Response to treatment: ***    PLAN/RECOMMENDATIONS:   Plan for treatment: {AMB OP THERAPY - THERAPY PLAN:[id number]::\"therapy treatment to continue next visit\"}.  Planned interventions for next visit: {PT PLANNED THERAPY INTERVENTIONS:[id number]}.      "

## 2019-02-13 ENCOUNTER — PHYSICAL THERAPY (OUTPATIENT)
Dept: PHYSICAL THERAPY | Facility: REHABILITATION | Age: 59
End: 2019-02-13
Attending: ORTHOPAEDIC SURGERY
Payer: MEDICAID

## 2019-02-13 DIAGNOSIS — Z96.652 S/P REVISION OF TOTAL KNEE, LEFT: ICD-10-CM

## 2019-02-13 PROCEDURE — 97110 THERAPEUTIC EXERCISES: CPT

## 2019-02-13 NOTE — OP THERAPY DAILY TREATMENT
"  Outpatient Physical Therapy  DAILY TREATMENT     [facility] Physical [address].  [address]  [place] [address]  Phone:  [phone]  Fax:  [phone]    Date: 02/13/2019    Patient: Korey Davis  YOB: 1960  MRN: [id number]     Time Calculation  Start time: 1200  Stop time: 1245 Time Calculation (min): 45 minutes     Chief Complaint: Knee Problem    Visit #: 3    SUBJECTIVE:  Pt states he continues to have a lot of issues with stiffness after he sits even for a little bit. He is most worried about getting the bend back in his knee.     OBJECTIVE:  Current objective measures:   Gait: ambulates with signficiant knee flexion/hip flexion, decreased WS onto the LLE 50% improved by end of session          Therapeutic Exercises (CPT 53224):     1. Quad sets on folded pillow, 5\" x 10 x 1    2. Supine knee bend with hamstring glide, 30\" flexion holds with 5\" straighten contraction x 10 reps    3. Ball bridges, 15 x 2    4. Hamstring roll on ball, 10 x 1    5. TKE, 5\" x 10 x 1    6. Standing WS, 10 x 1    7. Standing WS + lift, 10 x 1    8. Contract relax into flexion/extension of knee, 6\" x 3 each      Therapeutic Exercise Summary:         Time-based treatments/modalities:  Therapeutic exercise minutes (CPT 80272): 30 minutes       ASSESSMENT:   Response to treatment: Pt has increased hip flexion and knee flexion in gait due to hamstring and gastroc tightness, this improved by the end of the session. Pt concerned with flexion, will focus on this next visit.    PLAN/RECOMMENDATIONS:   Plan for treatment: therapy treatment to continue next visit.  Planned interventions for next visit: continue with current treatment.      "

## 2019-02-15 ENCOUNTER — PHYSICAL THERAPY (OUTPATIENT)
Dept: PHYSICAL THERAPY | Facility: REHABILITATION | Age: 59
End: 2019-02-15
Attending: ORTHOPAEDIC SURGERY
Payer: MEDICAID

## 2019-02-15 DIAGNOSIS — Z96.652 S/P REVISION OF TOTAL KNEE, LEFT: ICD-10-CM

## 2019-02-15 PROCEDURE — 97116 GAIT TRAINING THERAPY: CPT

## 2019-02-15 PROCEDURE — 97110 THERAPEUTIC EXERCISES: CPT

## 2019-02-15 NOTE — OP THERAPY DAILY TREATMENT
"  Outpatient Physical Therapy  DAILY TREATMENT     [facility] Physical [address].  [address]  [place] [address]  Phone:  [phone]  Fax:  [phone]    Date: 02/15/2019    Patient: Korey Davis  YOB: 1960  MRN: [id number]     Time Calculation  Start time: 1520  Stop time: 1610 Time Calculation (min): 50 minutes     Chief Complaint: Knee Problem and Post-Op Pain    Visit #: 4    SUBJECTIVE:  Didn't perform the stretching exercises yesterday as he felt stiff. Still having difficulty ambulating with knee in less than approx. 30 deg. Flexion with forward flexed posture when WB L. Toe down only.    OBJECTIVE:  Current objective measures:             Therapeutic Exercises (CPT 20890):     1. Quad sets on folded pillow, 5\" x 10 x 1    2. Short arc quad with additional towel roll, 5\" x 10 x 2    3. Prone knee extension stretch, patient able to get to -5 deg. extension but hips lift off of mat into anterior pelvic tilt    4. Hamstring roll on ball, 10 x 1    5. TKE standing at wall with balloon, 5\" x 10 x 1    6. Standing WS, 10 x 1    7. Standing WS + lift, 10 x 1    8. Supine hip flexor stretch, 30 sec. x 3, HEP    9. Seated hamstring stretch, 30 sec. x 3, HEP    10. Supine hamstring stretch , 10\" hold x 5      Therapeutic Exercise Summary: HEP as below:  Access Code: [email or web address]   URL: [email or web address]   Date: 02/15/2019   Prepared by: Trista Phillips      Exercises  · Supine Hamstring Stretch - 3 reps - 1 sets - 30 sec. hold - 5 - 7 x daily - 7x weekly  · Seated Hamstring Stretch - 3 reps - 1 sets - 30 sec. hold - 5 - 7 x daily - 7x weekly  · Gastroc Stretch on Wall - 10 reps - 3 sets - 5 - 7 x daily - 7x weekly  · Standing Terminal Knee Extension at Wall with Ball - 10 reps - 3 sets - 1x daily - 7x weekly  · Modified Sacha Stretch - 2 reps - 3 sets - 1 min hold - 5 - 7 x daily - 7x weekly  · Seated Knee Flexion Extension AROM - 10 reps - 3 sets - 5 - 7 x daily - 7x " weekly          Therapeutic Treatments and Modalities:     1. Gait Training (CPT 84962), cueing for heel toe pattern, engaging glute  10 min    Time-based treatments/modalities:  Gait training minutes (CPT 30171): 10 minutes  Therapeutic exercise minutes (CPT 64411): 30 minutes     Patient education:  Provided updated HEP handout. Discussed expected timeframe of improving muscle length and the benefit of frequent, tolerable intensity stretching vs. max intensity stretching with less frequency.  Encouraged the patient to perform stretching exercises with light to moderate intensity hourly when awake.    ASSESSMENT:   Response to treatment: Improved gait pattern at end of visit, particularly after hip flexor, gastroc, hamstring stretch.      PLAN/RECOMMENDATIONS:   Plan for treatment: therapy treatment to continue next visit.  Planned interventions for next visit: continue with current treatment.

## 2019-02-20 ENCOUNTER — APPOINTMENT (OUTPATIENT)
Dept: PHYSICAL THERAPY | Facility: REHABILITATION | Age: 59
End: 2019-02-20
Attending: ORTHOPAEDIC SURGERY
Payer: MEDICAID

## 2019-02-20 DIAGNOSIS — Z96.652 S/P REVISION OF TOTAL KNEE, LEFT: ICD-10-CM

## 2019-02-20 PROCEDURE — 97140 MANUAL THERAPY 1/> REGIONS: CPT

## 2019-02-20 PROCEDURE — 97110 THERAPEUTIC EXERCISES: CPT

## 2019-02-20 NOTE — OP THERAPY DAILY TREATMENT
"  Outpatient Physical Therapy  DAILY TREATMENT     [facility] Physical [address].  [address]  [place] [address]  Phone:  [phone]  Fax:  [phone]    Date: 02/20/2019    Patient: Korey Davis  YOB: 1960  MRN: [id number]     Time Calculation  Start time: 1420  Stop time: 1515 Time Calculation (min): 55 minutes     Chief Complaint: No chief complaint on file.    Visit #: 5    SUBJECTIVE:  Pt was late to session as his bus broke down and he tried to get her as fast as possible. He was able to be seen by primary PT for the start of the session (first 10 minutes) then Medina Burgos, PT, DPT was available to see him at 3:00.       OBJECTIVE:          Therapeutic Exercises (CPT 23211):     1. Quad sets on folded pillow, 5\" x 10 x 1, with tech    2. Bridges on ball, 5\" x10, withh tech    3. Prone knee flex with strap, x5, with Avelina    4. Prone knee ext AAROM (R LE assist), 10x, with tech    5. Rolling on ball with sheet, x10, with tech    6. Standing WS, 10 x 1, with Medina    7. Standing WS + lift, 10 x 1, with Avelina    8. Nu step , x 5 min, with Medina    Therapeutic Treatments and Modalities:     1. Manual Therapy (CPT 21396), STM to L hamstring. Supine HS stretching, Contract relax in supine for L knee ext.  , x15 min    Time-based treatments/modalities:  Manual therapy minutes (CPT 52457): 15 minutes  Therapeutic exercise minutes (CPT 00983): 10 minutes       Pain rating before treatment: \"I'm too used to pain.\"    ASSESSMENT:   Response to treatment: Spasming in glute and low back present with hamstring stretching, but pt willing to push ROM. Pt able to stand more upright and with less forward lean over R LE when walking without AD at end of session today. Pt is compliant with PT treatment.    PLAN/RECOMMENDATIONS:   Plan for treatment: therapy treatment to continue next visit.  Planned interventions for next visit: continue with current treatment.      "

## 2019-02-27 ENCOUNTER — PHYSICAL THERAPY (OUTPATIENT)
Dept: PHYSICAL THERAPY | Facility: REHABILITATION | Age: 59
End: 2019-02-27
Attending: ORTHOPAEDIC SURGERY
Payer: MEDICAID

## 2019-02-27 DIAGNOSIS — Z96.652 S/P REVISION OF TOTAL KNEE, LEFT: ICD-10-CM

## 2019-02-27 PROCEDURE — 97110 THERAPEUTIC EXERCISES: CPT

## 2019-02-27 NOTE — OP THERAPY DAILY TREATMENT
"  Outpatient Physical Therapy  DAILY TREATMENT     [facility] Physical [address].  [address]  [place] [address]  Phone:  [phone]  Fax:  [phone]    Date: 02/27/2019    Patient: Korey Davis  YOB: 1960  MRN: [id number]     Time Calculation  Start time: 1100  Stop time: 1150 Time Calculation (min): 50 minutes     Chief Complaint: Knee Problem    Visit #: 6    SUBJECTIVE:  Pt states he overstretched his knee and his hamstrings tightened up and has been unable to straighten his knee for two days.       OBJECTIVE:      Pt presents with 45 deg of L knee flexion, ambulates on ball of foot, unable to straighten knee or place pressure through LLE    Therapeutic Exercises (CPT 21599):     1. Nu step, L3, 5 min    2. Prone stretch w/ deep pressure, 3 min    3. Ball bridges, 10 x 1    4. Quad sets on pillow, 5\" x 10 x 1    5. Prone prolonged hamstring stretch with iastm, 5 min, slowly removed more and more pillows for prolonged stretch    Therapeutic Treatments and Modalities:     Therapeutic Treatment and Modalities Summary: MHP applied to hamstring in prone x 10 min    Time-based treatments/modalities:  Therapeutic exercise minutes (CPT 77213): 35 minutes       Pain rating before treatment: \"I'm too used to pain.\"    ASSESSMENT:   Response to treatment: Pt had set-back after stretching to aggressively. Pt's hamstrings continued to spasm throughout treatment. Pt able to reach 10 deg from extension in prone positioning after prolonged stretching, but returned to 25 deg from extension upon standing. Pt had to slowly place more weight through LLE and straighten upon standing, but had improved extension ROM by 20 deg from start. Pt encouraged to continued to gently stretch and move the R knee to prevent further hamstring cramping.    PLAN/RECOMMENDATIONS:   Plan for treatment: therapy treatment to continue next visit.  Planned interventions for next visit: continue with current " treatment. Assess low back. Try traction.

## 2019-03-01 ENCOUNTER — PHYSICAL THERAPY (OUTPATIENT)
Dept: PHYSICAL THERAPY | Facility: REHABILITATION | Age: 59
End: 2019-03-01
Attending: ORTHOPAEDIC SURGERY
Payer: MEDICAID

## 2019-03-01 DIAGNOSIS — Z96.652 S/P REVISION OF TOTAL KNEE, LEFT: ICD-10-CM

## 2019-03-01 PROCEDURE — 97014 ELECTRIC STIMULATION THERAPY: CPT

## 2019-03-01 PROCEDURE — 97110 THERAPEUTIC EXERCISES: CPT

## 2019-03-01 NOTE — OP THERAPY DAILY TREATMENT
"  Outpatient Physical Therapy  DAILY TREATMENT     [facility] Physical [address].  [address]  [place] [address]  Phone:  [phone]  Fax:  [phone]    Date: 03/01/2019    Patient: Korey Davis  YOB: 1960  MRN: [id number]     Time Calculation  Start time: 1530  Stop time: 1620 Time Calculation (min): 50 minutes     Chief Complaint: Knee Problem    Visit #: 7    SUBJECTIVE:  Pt states his knee is better than last treatment, but still unable to straighten.       OBJECTIVE:      Pt presents with 30 deg of knee flexion in gait  Knee ROM: 21 deg lacking extension to 84 deg flexion      Therapeutic Exercises (CPT 04047):     1. Nu step, L3, 1 min    2. Elliptical, L5, 1 min, speed    3. Leg press, 8c, 30 x 1    4. Step ups, 6\", 10 x 1, cues to fall into knee extension    5. Seated hamstring stretch: heel on step, 30\" x 3 each, alternate between pushing on knee and kneeling forward    6. Standing hamstring stretch, 10\" x 3, heel/toe raised    7. Ball rolls, 20 x 1    8. Bridge on ball, 20 x 1    Therapeutic Treatments and Modalities:     1. E Stim Unattended (CPT 82720), russian stim to L quad with balloon smash, 10/10\" w/ MHP x 10 min    Time-based treatments/modalities:  Therapeutic exercise minutes (CPT 56124): 25 minutes       Pain rating before treatment: \"I'm too used to pain.\"    ASSESSMENT:   Response to treatment: Pt presents without 4WW today, ambulates with knee in 40 deg knee flexion, increased hip flexion, on toe. Pt able to tolerate more stretching today without increased hamstring spasm, but continues to lack into extension from neutral.     PLAN/RECOMMENDATIONS:   Plan for treatment: therapy treatment to continue next visit.  Planned interventions for next visit: continue with current treatment.    "

## 2019-03-13 ENCOUNTER — TELEPHONE (OUTPATIENT)
Dept: PHYSICAL THERAPY | Facility: REHABILITATION | Age: 59
End: 2019-03-13

## 2019-03-13 NOTE — OP THERAPY DISCHARGE SUMMARY
Outpatient Physical Therapy  DISCHARGE SUMMARY NOTE      [address]  [address]  [place] [address]  Phone:  [phone]  Fax:  [phone]    Date of Visit: 03/13/2019    Patient: Korey Davis  YOB: 1960  MRN: [id number]     Referring Provider: Pramod Boston M.D.   Referring Diagnosis Presence of left artificial knee joint [Z96.652]     Physical Therapy Occurrence Codes    Date of onset of impairment:  12/27/18   Date physical therapy care plan established or reviewed:  1/25/19   Date physical therapy treatment started:  1/25/19          Functional Limitations and Severity Modifiers      Goal:     Discharge:         Your patient is being discharged from Physical Therapy with the following comments:   · Patient cancelled or missed more than 2 scheduled appointments/non-compliant    Comments:  Pt has n/s last 3 appointments. Pt was alled after each missed appointment to check in and remind pt of next appointment. Voicemails were left as pt did not answer phone and pt had not returned calls. Pt was making improvements, but continued to have significant limitations into knee ROM and WB of the  LLE.     Limitations Remaining:  Pt had significant limitations into knee ROM and WB of the LLE. Unable to get exact measurements as pt did not show for last three appointments.    Recommendations:  Pt is d/c from therapy due to n/s last three appointments.  It is unclear why pt had stopped coming to therapy as had been very diligent in attending his appointments up until these last three. Voicemails were left, but unable to reach pt and pt did not return calls. Pt would be welcome to return to PT with a new referral as he was making improvements, but continued to have limitations in his last therapy session.    Avelina Bernardo, PT, DPT    Date: 3/13/2019

## 2019-03-18 ENCOUNTER — APPOINTMENT (OUTPATIENT)
Dept: PHYSICAL THERAPY | Facility: REHABILITATION | Age: 59
End: 2019-03-18
Attending: ORTHOPAEDIC SURGERY
Payer: MEDICAID

## 2019-06-29 ENCOUNTER — HOSPITAL ENCOUNTER (EMERGENCY)
Dept: HOSPITAL 8 - ED | Age: 59
Discharge: HOME | End: 2019-06-29
Payer: MEDICAID

## 2019-06-29 VITALS — WEIGHT: 180.78 LBS | BODY MASS INDEX: 24.49 KG/M2 | HEIGHT: 72 IN

## 2019-06-29 VITALS — SYSTOLIC BLOOD PRESSURE: 131 MMHG | DIASTOLIC BLOOD PRESSURE: 74 MMHG
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
## 2019-06-29 DIAGNOSIS — F17.210: ICD-10-CM

## 2019-06-29 DIAGNOSIS — S43.51XA: Primary | ICD-10-CM

## 2019-06-29 PROCEDURE — 99283 EMERGENCY DEPT VISIT LOW MDM: CPT

## 2019-06-29 PROCEDURE — 71046 X-RAY EXAM CHEST 2 VIEWS: CPT

## 2019-07-05 ENCOUNTER — HOSPITAL ENCOUNTER (EMERGENCY)
Facility: MEDICAL CENTER | Age: 59
End: 2019-07-05
Attending: EMERGENCY MEDICINE
Payer: MEDICAID

## 2019-07-05 ENCOUNTER — APPOINTMENT (OUTPATIENT)
Dept: RADIOLOGY | Facility: MEDICAL CENTER | Age: 59
End: 2019-07-05
Attending: EMERGENCY MEDICINE
Payer: MEDICAID

## 2019-07-05 VITALS
HEART RATE: 77 BPM | WEIGHT: 189.82 LBS | TEMPERATURE: 98.4 F | RESPIRATION RATE: 17 BRPM | OXYGEN SATURATION: 96 % | DIASTOLIC BLOOD PRESSURE: 68 MMHG | BODY MASS INDEX: 25.74 KG/M2 | SYSTOLIC BLOOD PRESSURE: 129 MMHG

## 2019-07-05 DIAGNOSIS — R60.0 LEG EDEMA, LEFT: ICD-10-CM

## 2019-07-05 DIAGNOSIS — G89.29 OTHER CHRONIC PAIN: ICD-10-CM

## 2019-07-05 DIAGNOSIS — M79.671 FOOT PAIN, RIGHT: ICD-10-CM

## 2019-07-05 PROCEDURE — 93971 EXTREMITY STUDY: CPT | Mod: LT

## 2019-07-05 PROCEDURE — 73630 X-RAY EXAM OF FOOT: CPT | Mod: RT

## 2019-07-05 PROCEDURE — 99284 EMERGENCY DEPT VISIT MOD MDM: CPT

## 2019-07-05 NOTE — DISCHARGE INSTRUCTIONS
Use Tylenol and Motrin if needed for pain and follow-up with your orthopedist you are likely to need to go back to physical therapy.

## 2019-07-05 NOTE — ED NOTES
Pt discharged home with crutches, after crutch training, and note for work. Pt is to follow up with Dr. Freeman of orthopedics.

## 2019-07-05 NOTE — ED NOTES
Chief Complaint   Patient presents with   • Foot Swelling     Left, onset last night   • Knee Swelling     and pain     Pt wheeled to triage, initially c/o left foot swelling, I noted left entire leg swelling especially knee area. Pt said that he had surgery of both knees beginning of year . Per pt swelling foot started last night, having difficulty with ambulating.

## 2019-07-05 NOTE — ED NOTES
"Pt reports left knee and leg swelling since last night, pt also reports pain to bottom of rt foot on standing. Pt reports \"I'm just worn out\".   "

## 2019-07-05 NOTE — ED PROVIDER NOTES
"ED Provider Note    CHIEF COMPLAINT  Chief Complaint   Patient presents with   • Foot Swelling     Left, onset last night   • Knee Swelling     and pain       HPI  Korey Davis is a 59 y.o. male who presents to the emergency department complaining of left leg swelling and right foot pain.  The patient is a poor historian but says that the left leg is been swollen since last night he is adamant that he does not recognize any specific exacerbating or alleviating factors.  He also complains of right foot pain when he walks but there is been no trauma to the right foot and the right foot is not hurting him when he is at rest.  Chart review shows that his right leg symptoms are probably very chronic please see chart review below.  Also after the completion of his evaluation the patient told me that he needs a note because he missed his \"drug class\" today.    REVIEW OF SYSTEMS no fever or chills no chest pain or difficulty breathing or hemoptysis no recent trauma.  All other systems negative    PAST MEDICAL HISTORY  Past Medical History:   Diagnosis Date   • Arthritis     generalized, knees   • Asthma    • Back pain 08/17/2018    neck, knees, 8.5/10   • Bilateral knee pain 12/18/2018   • Breath shortness     12/18/18-Denies.   • COPD (chronic obstructive pulmonary disease) ([facility])     12/18/18-Pt denies.   • Dental disorder     upper partial, doesn't use   • Diabetes ([facility])     12/18/18-does not check glucose at home. Refuses to take meds.    • Pneumonia 06/2017   • Urinary bladder disorder     reports frequency   • Urinary incontinence        FAMILY HISTORY  Family History   Problem Relation Age of Onset   • No Known Problems Mother    • Cancer Father         lung   • No Known Problems Sister    • No Known Problems Brother        SOCIAL HISTORY  Social History     Social History   • Marital status: Single     Spouse name: N/A   • Number of children: N/A   • Years of education: N/A     Social History Main Topics   • " Smoking status: Current Some Day Smoker     Packs/day: 0.75     Years: 35.00     Types: Cigarettes   • Smokeless tobacco: Never Used      Comment: and using e-cig   • Alcohol use No   • Drug use: No      Comment: Notes prior methamphetamine use, Notes previously stopped use, denies current use   • Sexual activity: Yes     Partners: Female      Comment: condom occasionally      Other Topics Concern   •  Service No   • Blood Transfusions No   • Caffeine Concern No   • Occupational Exposure No   • Hobby Hazards No   • Sleep Concern Yes     sometimes    • Stress Concern No   • Weight Concern No   • Special Diet No   • Back Care Yes   • Exercise No   • Bike Helmet No   • Seat Belt Yes   • Self-Exams No     Social History Narrative   • No narrative on file       SURGICAL HISTORY  Past Surgical History:   Procedure Laterality Date   • CAST APPLICATION Left 12/30/2018    Procedure: CAST APPLICATION;  Surgeon: Pramod Boston M.D.;  Location: SURGERY [facility];  Service: Orthopedics   • KNEE REVISION TOTAL Left 12/27/2018    Procedure: KNEE REVISION TOTAL;  Surgeon: Pramod Boston M.D.;  Location: SURGERY [facility];  Service: Orthopedics   • KNEE REVISION TOTAL Left 8/21/2018    Procedure: KNEE REVISION TOTAL;  Surgeon: Pramod Boston M.D.;  Location: SURGERY [facility];  Service: Orthopedics   • COLONOSCOPY  07/2018   • HAND SURGERY Right 2016    unsuccessful tendon repair middle finger.   • KNEE REPLACEMENT, TOTAL  12/1/2011    [facility]   • KNEE ARTHROSCOPY Left [id number]    note 6 total left knee surgeries  in [place]       CURRENT MEDICATIONS  Home Medications     Reviewed by Angela Brooks R.N. (Registered Nurse) on 07/05/19 at 0953  Med List Status: Complete   Medication Last Dose Status   albuterol 108 (90 Base) MCG/ACT Aero Soln inhalation aerosol 7/2/2019 Active                ALLERGIES  No Known Allergies    PHYSICAL EXAM  VITAL SIGNS: /68   Pulse 77   Temp 36.9 °C  (98.4 °F) (Temporal)   Resp 17   Wt 86.1 kg (189 lb 13.1 oz)   SpO2 96%   BMI 25.74 kg/m²    Oxygen saturation is interpreted as adequate  Constitutional: Awake verbal talkative nontoxic-appearing  HENT: No sign of trauma to the head  Eyes: No erythema discharge or jaundice  Neck: Trachea midline no JVD  Cardiovascular: Regular rate and rhythm  Lungs: Clear and equal bilaterally  Abdomen/Back: Soft nontender nondistended no peritoneal findings no thoracic or lumbar tenderness  Skin: Warm and dry  Musculoskeletal: There is a well-healed surgical scar on the left knee the left leg is slightly enlarged but there is no pitting edema there is no erythema or evidence of infection.  There is no acute bony deformity involving the right foot.  Neurologic: Awake verbal and moving all extremities    CHART REVIEW  Chart review shows that the patient had surgery on the left knee and then apparently abruptly stopped going to his physical therapy appointments.  He apparently had a history of chronic shortening of the hamstrings in that area.  The patient confirms that he quit physical therapy before he was discharged he cannot offer any reasons for this.    Radiology  DX-FOOT-COMPLETE 3+ RIGHT   Final Result      No evidence of acute fracture or dislocation.      Hallux valgus deformity with mild overlying soft tissue swelling.      Calcaneal spurring.         US-EXTREMITY VENOUS LOWER UNILAT LEFT   Final Result        There is no evidence of acute DVT according to the preliminary ultrasound report    MEDICAL DECISION MAKING and DISPOSITION  In the emergency department the patient fell asleep while waiting for his results he looks very comfortable.  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  Unfortunately this raises the  possibility of malingering but I did write a note indicating he was in the emergency department.  We have given him crutches as he feels his cane is not sufficient to assist his ambulation and I recommended that he call Dr. Boston and arrange office recheck during the week    IMPRESSION  1.  Chronic left leg swelling  2.  Right foot pain         Electronically signed by: Owen Millan, 7/5/2019 3:39 PM

## (undated) DEVICE — PAD UNIVERSAL MULTI USE (1/EA)

## (undated) DEVICE — GLOVE BIOGEL SZ 7.5 SURGICAL PF LTX - (50PR/BX 4BX/CA)

## (undated) DEVICE — SODIUM CHL IRRIGATION 0.9% 1000ML (12EA/CA)

## (undated) DEVICE — SLEEVE, VASO, THIGH, MED

## (undated) DEVICE — ELECTRODE DUAL RETURN W/ CORD - (50/PK)

## (undated) DEVICE — CANISTER SUCTION RIGID RED 1500CC (40EA/CA)

## (undated) DEVICE — DRESSING AQUACEL AG ADVANTAGE 3.5 X 10" (10EA/BX)"

## (undated) DEVICE — DRESSING POST OP BORDER 4 X 10 (5EA/BX)

## (undated) DEVICE — GLOVE BIOGEL SZ 8 SURGICAL PF LTX - (50PR/BX 4BX/CA)

## (undated) DEVICE — KIT ROOM DECONTAMINATION

## (undated) DEVICE — SUTURE 2-0 MONOCRYL PLUS UNDYED CT-1 1 X 36 (36EA/BX)"

## (undated) DEVICE — TIP INTPLS HFLO ML ORFC BTRY - (12/CS)  FOR SURGILAV

## (undated) DEVICE — LACTATED RINGERS INJ 1000 ML - (14EA/CA 60CA/PF)

## (undated) DEVICE — TUBE CONNECTING SUCTION - CLEAR PLASTIC STERILE 72 IN (50EA/CA)

## (undated) DEVICE — BLOCK

## (undated) DEVICE — Device

## (undated) DEVICE — GLOVE BIOGEL SZ 6.5 SURGICAL PF LTX (50PR/BX 4BX/CA)

## (undated) DEVICE — MASK ANESTHESIA ADULT  - (100/CA)

## (undated) DEVICE — PROTECTOR ULNA NERVE - (36PR/CA)

## (undated) DEVICE — IMMOBILIZER KNEE 24 INCH

## (undated) DEVICE — SUTURE 2-0 VICRYL PLUS CT-1 - 8 X 18 INCH(12/BX)

## (undated) DEVICE — COTTON ROLL 1 POUND BIOSEAL - (5RL/CA)

## (undated) DEVICE — KIT HIP PREP IM ENCHANCE TOTAL (5EA/BX)

## (undated) DEVICE — SUTURE 3-0 MONOCRYL PLUS PS-1 - 27 INCH (36/BX)

## (undated) DEVICE — TAPE CASTING 5 IN X 4 YDS - TUF-STUFF (10/BX)

## (undated) DEVICE — SUTURE QUILL 2 PDO - (12/BX)

## (undated) DEVICE — BLADE SAGITTAL SYSTEM 18MM

## (undated) DEVICE — BLADE RECIPROCATING 12.7 X 78.7 X 1.0MM (1/EA)

## (undated) DEVICE — CANISTER SUCTION 3000ML MECHANICAL FILTER AUTO SHUTOFF MEDI-VAC NONSTERILE LF DISP  (40EA/CA)

## (undated) DEVICE — TUBING CLEARLINK DUO-VENT - C-FLO (48EA/CA)

## (undated) DEVICE — SENSOR SPO2 NEO LNCS ADHESIVE (20/BX) SEE USER NOTES

## (undated) DEVICE — MIXER BONE CEMENT REVOLUTION - W/FEMORAL PRESSURIZER (6/CA)

## (undated) DEVICE — BLADE SAGITTAL SAW DUAL CUT 25.0 X 90.0 X 1.27MM (1/EA)

## (undated) DEVICE — SET EXTENSION WITH 2 PORTS (48EA/CA) ***PART #2C8610 IS A SUBSTITUTE*****

## (undated) DEVICE — NEPTUNE 4 PORT MANIFOLD - (20/PK)

## (undated) DEVICE — SUTURE 1 VICRYL PLUS CTX - 8 X 18 INCH (12/BX)

## (undated) DEVICE — BLADE SURGICAL CLIPPER - (50EA/CA)

## (undated) DEVICE — LENS/HOOD FOR SPACESUIT - (32/PK) PEEL AWAY FACE

## (undated) DEVICE — GLOVE BIOGEL INDICATOR SZ 8 SURGICAL PF LTX - (50/BX 4BX/CA)

## (undated) DEVICE — DISSECT TOOL MIDAS 14BA50

## (undated) DEVICE — GOWN WARMING STANDARD FLEX - (30/CA)

## (undated) DEVICE — DRAPE LARGE 3 QUARTER - (20/CA)

## (undated) DEVICE — BLADE SAGITTAL DUAL 25MM

## (undated) DEVICE — SUTURE GENERAL

## (undated) DEVICE — SET LEADWIRE 5 LEAD BEDSIDE DISPOSABLE ECG (1SET OF 5/EA)

## (undated) DEVICE — ELECTRODE 850 FOAM ADHESIVE - HYDROGEL RADIOTRNSPRNT (50/PK)

## (undated) DEVICE — GLOVE SZ 7.5 LF PROTEXIS (50PR/BX)

## (undated) DEVICE — GLOVE BIOGEL PI ORTHO SZ 8 PF LF (40PR/BX)

## (undated) DEVICE — BLADE SAGITTAL 34MM

## (undated) DEVICE — NEEDLE W/FACET TIP DULL VERSION W/STIMULATION CABLE SONOPLEX 21G X 4 (10/EA)"

## (undated) DEVICE — PACK TOTAL KNEE  (1/CA)

## (undated) DEVICE — NEEDLE NON-SAFETY HYPO 21 GA X 1 1/2 IN HYPO (100/BX)

## (undated) DEVICE — HEAD HOLDER JUNIOR/ADULT

## (undated) DEVICE — CHLORAPREP 26 ML APPLICATOR - ORANGE TINT(25/CA)

## (undated) DEVICE — CEMENT ORTHOPEDIC HV US  (10/PK): Type: IMPLANTABLE DEVICE | Site: KNEE | Status: NON-FUNCTIONAL

## (undated) DEVICE — BLADE 90X18X1.27MM SAW SAGITTAL

## (undated) DEVICE — TAPE CASTING 4 IN X 4 YDS - TUF-STUFF (10/BX)

## (undated) DEVICE — GLOVE BIOGEL PI ORTHO SZ 7 PF LF (40PR/BX)

## (undated) DEVICE — GLOVE BIOGEL PI INDICATOR SZ 8.0 SURGICAL PF LF -(50/BX 4BX/CA)

## (undated) DEVICE — GLOVE BIOGEL INDICATOR SZ 6.5 SURGICAL PF LTX - (50PR/BX 4BX/CA)

## (undated) DEVICE — KIT ANESTHESIA W/CIRCUIT & 3/LT BAG W/FILTER (20EA/CA)

## (undated) DEVICE — SUCTION INSTRUMENT YANKAUER BULBOUS TIP W/O VENT (50EA/CA)

## (undated) DEVICE — BURR ROUND 66MM

## (undated) DEVICE — STOCKINET TUBULAR 6IN STERILE - 6 X 48YDS (25/CA)

## (undated) DEVICE — SODIUM CHL. IRRIGATION 0.9% 3000ML (4EA/CA 65CA/PF)

## (undated) DEVICE — MASK, LARYNGEAL AIRWAY #4

## (undated) DEVICE — MASK AIRWAY SIZE 4 UNIQUE SILICON (10EA/BX)

## (undated) DEVICE — GOWN SURGEONS X-LARGE - DISP. (30/CA)

## (undated) DEVICE — HANDPIECE 10FT INTPLS SCT PLS IRRIGATION HAND CONTROL SET (6/PK)

## (undated) DEVICE — HOOD FOR SPACESUITE/PEEL AWAY (SM ONLY)

## (undated) DEVICE — HUMID-VENT HEAT AND MOISTURE EXCHANGE- (50/BX)

## (undated) DEVICE — BLADE OSCILLATING 9MMX24.6MMX0.4MM LIKE STRYKER 2296-3-111

## (undated) DEVICE — CONTAINER SPECIMEN BAG OR - STERILE 4 OZ W/LID (100EA/CA)

## (undated) DEVICE — GLOVE BIOGEL SZ 6 PF LATEX - (50EA/BX 4BX/CA)

## (undated) DEVICE — GLOVE BIOGEL PI ORTHO SZ 6 1/2 SURGICAL PF LF (40PR/BX)